# Patient Record
Sex: FEMALE | Race: WHITE | NOT HISPANIC OR LATINO | Employment: FULL TIME | URBAN - METROPOLITAN AREA
[De-identification: names, ages, dates, MRNs, and addresses within clinical notes are randomized per-mention and may not be internally consistent; named-entity substitution may affect disease eponyms.]

---

## 2017-11-08 ENCOUNTER — OFFICE VISIT (OUTPATIENT)
Dept: OBSTETRICS AND GYNECOLOGY | Facility: CLINIC | Age: 52
End: 2017-11-08

## 2017-11-08 VITALS
BODY MASS INDEX: 35.2 KG/M2 | SYSTOLIC BLOOD PRESSURE: 118 MMHG | HEIGHT: 66 IN | DIASTOLIC BLOOD PRESSURE: 70 MMHG | WEIGHT: 219 LBS

## 2017-11-08 DIAGNOSIS — Z01.419 WELL WOMAN EXAM WITH ROUTINE GYNECOLOGICAL EXAM: Primary | ICD-10-CM

## 2017-11-08 PROCEDURE — 99396 PREV VISIT EST AGE 40-64: CPT | Performed by: OBSTETRICS & GYNECOLOGY

## 2017-11-08 RX ORDER — MELOXICAM 15 MG/1
TABLET ORAL
Refills: 0 | COMMUNITY
Start: 2017-10-05 | End: 2021-06-30 | Stop reason: SDUPTHER

## 2017-11-08 NOTE — PROGRESS NOTES
Subjective:    Patient Jewels Beltrán is a 51 y.o. female.   Chief Complaint   Patient presents with   • Gynecologic Exam     MX TODAY. DX DUE. ALICIA HAS ONE SCHED. IN JAN. SMOKER  NO HYST.    CCNo menses in 3 months patient normally was getting breast tenderness PMS prior to periods has had no symptoms since she quit the periods no hot flashes so the patient certainly is perimenopausal  She is here for her annual exam and discussion about hormone control    HPI  patient's having knee issues and is on meloxicam  Patient is a smoker and does not want to quit options are discussed      The following portions of the patient's history were reviewed and updated as appropriate: allergies, current medications, past family history, past medical history, past social history, past surgical history and problem list.      Review of Systems   Constitutional: Negative.    HENT: Negative.    Eyes: Negative.    Respiratory: Negative.    Cardiovascular: Negative.    Gastrointestinal: Negative for abdominal distention, abdominal pain, anal bleeding, blood in stool, constipation, diarrhea, nausea, rectal pain and vomiting.   Endocrine: Negative for cold intolerance, heat intolerance, polydipsia, polyphagia and polyuria.   Genitourinary: Negative.  Negative for decreased urine volume, dyspareunia, dysuria, enuresis, flank pain, frequency, genital sores, hematuria, menstrual problem, pelvic pain, urgency, vaginal bleeding, vaginal discharge and vaginal pain.   Musculoskeletal: Negative.    Skin: Negative.    Allergic/Immunologic: Negative.    Neurological: Negative.    Hematological: Negative for adenopathy. Does not bruise/bleed easily.   Psychiatric/Behavioral: Negative for agitation, confusion and sleep disturbance. The patient is not nervous/anxious.          Objective:      Physical Exam   Constitutional: She appears well-developed and well-nourished. She is not intubated.   HENT:   Head: Hair is normal.   Nose: Nose normal.    Mouth/Throat: Oropharynx is clear and moist.   Eyes: Conjunctivae are normal.   Neck: Normal carotid pulses and no JVD present. No tracheal tenderness, no spinous process tenderness and no muscular tenderness present. Carotid bruit is not present. No rigidity. No edema, no erythema and normal range of motion present. No thyroid mass and no thyromegaly present.   Cardiovascular: Normal rate, regular rhythm, S1 normal and normal heart sounds.  Exam reveals no gallop.    No murmur heard.  Pulmonary/Chest: Effort normal. No accessory muscle usage or stridor. No apnea, no tachypnea and no bradypnea. She is not intubated. No respiratory distress. She has no wheezes. She has no rales. She exhibits no tenderness. Right breast exhibits no inverted nipple, no mass, no nipple discharge, no skin change and no tenderness. Left breast exhibits no inverted nipple, no mass, no nipple discharge, no skin change and no tenderness.   Abdominal: Soft. Bowel sounds are normal. She exhibits no distension and no mass. There is no tenderness. There is no rebound and no guarding. No hernia.   Genitourinary: Vagina normal and uterus normal. Rectal exam shows no external hemorrhoid, no internal hemorrhoid, no fissure, no mass, no tenderness and anal tone normal. There is no rash, tenderness, lesion or injury on the right labia. There is no rash, tenderness, lesion or injury on the left labia. Uterus is not deviated, not enlarged, not fixed and not tender. Cervix exhibits no motion tenderness, no discharge and no friability. Right adnexum displays no mass and no tenderness. Left adnexum displays no mass and no tenderness. No erythema, tenderness or bleeding in the vagina. No foreign body in the vagina. No signs of injury around the vagina. No vaginal discharge found.   Musculoskeletal: She exhibits no edema or tenderness.        Right shoulder: She exhibits no tenderness, no swelling, no pain and no spasm.   Lymphadenopathy:        Head  (right side): No submental, no submandibular, no tonsillar, no preauricular, no posterior auricular and no occipital adenopathy present.        Head (left side): No submental, no submandibular, no tonsillar, no preauricular, no posterior auricular and no occipital adenopathy present.     She has no cervical adenopathy.        Right cervical: No superficial cervical, no deep cervical and no posterior cervical adenopathy present.       Left cervical: No superficial cervical, no deep cervical and no posterior cervical adenopathy present.        Right axillary: No pectoral and no lateral adenopathy present.        Left axillary: No pectoral and no lateral adenopathy present.       Right: No inguinal, no supraclavicular and no epitrochlear adenopathy present.        Left: No inguinal, no supraclavicular and no epitrochlear adenopathy present.   Neurological: No cranial nerve deficit. Coordination normal.   Skin: Skin is warm and dry. No abrasion, no bruising, no burn, no lesion, no petechiae, no purpura and no rash noted. Rash is not macular, not maculopapular, not nodular and not urticarial. No cyanosis or erythema. No pallor. Nails show no clubbing.   Psychiatric: She has a normal mood and affect. Her behavior is normal.         Assessment andplan  this patient is perimenopausal and possibly postmenopausal having no hormone symptoms she had no prescriptions are given calcium and vitamin D are discussed immunizations are discussed in his take a regular multivitamin and a bone density next year    Patient has been instructed to perform a self breast exam on a weekly basis, a yearly mammogram, pap smear yearly unless instructed otherwise and bone density every 2 years.  I recommended that the patient not smoke, and discussed smoking cessation when appropriate.     Jewels was seen today for gynecologic exam.    Diagnoses and all orders for this visit:    Well woman exam with routine gynecological exam  -     IGP,rfx Aptima  HPV All Pth - ThinPrep Vial, Cervix

## 2017-11-10 LAB
CONV .: NORMAL
CYTOLOGIST CVX/VAG CYTO: NORMAL
CYTOLOGY CVX/VAG DOC THIN PREP: NORMAL
DX ICD CODE: NORMAL
HIV 1 & 2 AB SER-IMP: NORMAL
OTHER STN SPEC: NORMAL
PATH REPORT.FINAL DX SPEC: NORMAL
STAT OF ADQ CVX/VAG CYTO-IMP: NORMAL

## 2018-12-03 DIAGNOSIS — Z12.31 VISIT FOR SCREENING MAMMOGRAM: Primary | ICD-10-CM

## 2019-01-09 ENCOUNTER — OFFICE VISIT (OUTPATIENT)
Dept: OBSTETRICS AND GYNECOLOGY | Facility: CLINIC | Age: 54
End: 2019-01-09

## 2019-01-09 ENCOUNTER — APPOINTMENT (OUTPATIENT)
Dept: WOMENS IMAGING | Facility: HOSPITAL | Age: 54
End: 2019-01-09

## 2019-01-09 ENCOUNTER — PROCEDURE VISIT (OUTPATIENT)
Dept: OBSTETRICS AND GYNECOLOGY | Facility: CLINIC | Age: 54
End: 2019-01-09

## 2019-01-09 VITALS
SYSTOLIC BLOOD PRESSURE: 126 MMHG | DIASTOLIC BLOOD PRESSURE: 80 MMHG | BODY MASS INDEX: 36.8 KG/M2 | WEIGHT: 229 LBS | HEIGHT: 66 IN

## 2019-01-09 DIAGNOSIS — Z00.00 ANNUAL PHYSICAL EXAM: Primary | ICD-10-CM

## 2019-01-09 DIAGNOSIS — Z01.419 ENCOUNTER FOR ANNUAL ROUTINE GYNECOLOGICAL EXAMINATION: ICD-10-CM

## 2019-01-09 DIAGNOSIS — Z12.31 VISIT FOR SCREENING MAMMOGRAM: Primary | ICD-10-CM

## 2019-01-09 PROCEDURE — 99396 PREV VISIT EST AGE 40-64: CPT | Performed by: OBSTETRICS & GYNECOLOGY

## 2019-01-09 PROCEDURE — 77067 SCR MAMMO BI INCL CAD: CPT | Performed by: OBSTETRICS & GYNECOLOGY

## 2019-01-09 PROCEDURE — 77067 SCR MAMMO BI INCL CAD: CPT | Performed by: RADIOLOGY

## 2019-01-09 NOTE — PROGRESS NOTES
Subjective:    Patient Jewels Beltrán is a 53 y.o. female.   Chief Complaint   Patient presents with   • Gynecologic Exam     AE, NO HYST, SMOKER     CC patient's having no symptoms she is a smoker and needs to quit the patient was a little upset that she cannot do her mammogram today.  In the office so we will try to schedule it at the hospital since she is from each Which Is a Significant Inconvenience for the Otherwise Patient Is Doing Well Having No Major Complaints    HPI      The following portions of the patient's history were reviewed and updated as appropriate: allergies, current medications, past family history, past medical history, past social history, past surgical history and problem list.      Review of Systems   Constitutional: Negative.    HENT: Negative.    Eyes: Negative.    Respiratory: Negative.    Cardiovascular: Negative.    Gastrointestinal: Negative for abdominal distention, abdominal pain, anal bleeding, blood in stool, constipation, diarrhea, nausea, rectal pain and vomiting.   Endocrine: Negative for cold intolerance, heat intolerance, polydipsia, polyphagia and polyuria.   Genitourinary: Negative.  Negative for decreased urine volume, dyspareunia, dysuria, enuresis, flank pain, frequency, genital sores, hematuria, menstrual problem, pelvic pain, urgency, vaginal bleeding, vaginal discharge and vaginal pain.   Musculoskeletal: Negative.    Skin: Negative.    Allergic/Immunologic: Negative.    Neurological: Negative.    Hematological: Negative for adenopathy. Does not bruise/bleed easily.   Psychiatric/Behavioral: Negative for agitation, confusion and sleep disturbance. The patient is not nervous/anxious.          Objective:      Physical Exam   Constitutional: She appears well-developed and well-nourished. She is not intubated.   HENT:   Head: Hair is normal.   Nose: Nose normal.   Mouth/Throat: Oropharynx is clear and moist.   Eyes: Conjunctivae are normal.   Neck: Normal carotid  pulses and no JVD present. No tracheal tenderness, no spinous process tenderness and no muscular tenderness present. Carotid bruit is not present. No neck rigidity. No edema, no erythema and normal range of motion present. No thyroid mass and no thyromegaly present.   Cardiovascular: Normal rate, regular rhythm, S1 normal and normal heart sounds. Exam reveals no gallop.   No murmur heard.  Pulmonary/Chest: Effort normal. No accessory muscle usage or stridor. No apnea, no tachypnea and no bradypnea. She is not intubated. No respiratory distress. She has no wheezes. She has no rales. She exhibits no tenderness. Right breast exhibits no inverted nipple, no mass, no nipple discharge, no skin change and no tenderness. Left breast exhibits no inverted nipple, no mass, no nipple discharge, no skin change and no tenderness.   Abdominal: Soft. Bowel sounds are normal. She exhibits no distension and no mass. There is no tenderness. There is no rebound and no guarding. No hernia.   Genitourinary: Vagina normal and uterus normal. Rectal exam shows no external hemorrhoid, no internal hemorrhoid, no fissure, no mass, no tenderness and anal tone normal. There is no rash, tenderness, lesion or injury on the right labia. There is no rash, tenderness, lesion or injury on the left labia. Uterus is not deviated, not enlarged, not fixed and not tender. Cervix exhibits no motion tenderness, no discharge and no friability. Right adnexum displays no mass and no tenderness. Left adnexum displays no mass and no tenderness. No erythema, tenderness or bleeding in the vagina. No foreign body in the vagina. No signs of injury around the vagina. No vaginal discharge found.   Musculoskeletal: She exhibits no edema or tenderness.        Right shoulder: She exhibits no tenderness, no swelling, no pain and no spasm.   Lymphadenopathy:        Head (right side): No submental, no submandibular, no tonsillar, no preauricular, no posterior auricular and  no occipital adenopathy present.        Head (left side): No submental, no submandibular, no tonsillar, no preauricular, no posterior auricular and no occipital adenopathy present.     She has no cervical adenopathy.        Right cervical: No superficial cervical, no deep cervical and no posterior cervical adenopathy present.       Left cervical: No superficial cervical, no deep cervical and no posterior cervical adenopathy present.        Right axillary: No pectoral and no lateral adenopathy present.        Left axillary: No pectoral and no lateral adenopathy present.       Right: No inguinal, no supraclavicular and no epitrochlear adenopathy present.        Left: No inguinal, no supraclavicular and no epitrochlear adenopathy present.   Neurological: No cranial nerve deficit. Coordination normal.   Skin: Skin is warm and dry. No abrasion, no bruising, no burn, no lesion, no petechiae, no purpura and no rash noted. Rash is not macular, not maculopapular, not nodular and not urticarial. No cyanosis or erythema. No pallor. Nails show no clubbing.   Psychiatric: She has a normal mood and affect. Her behavior is normal.         Assessment and Plan: Patient needs to quit smoking the otherwise exam is very good will try and schedule her mammogram today just for patient convenience    Patient has been instructed to perform a self breast exam on a weekly basis, a yearly mammogram, pap smear yearly unless instructed otherwise and bone density every 2 years.  I recommended that the patient not smoke, and discussed smoking cessation when appropriate.     There are no diagnoses linked to this encounter.

## 2019-01-14 DIAGNOSIS — Z12.31 VISIT FOR SCREENING MAMMOGRAM: ICD-10-CM

## 2019-06-03 ENCOUNTER — HOSPITAL ENCOUNTER (OUTPATIENT)
Dept: GENERAL RADIOLOGY | Facility: HOSPITAL | Age: 54
Discharge: HOME | End: 2019-06-03
Attending: NURSE PRACTITIONER

## 2019-07-26 ENCOUNTER — TELEPHONE (OUTPATIENT)
Dept: OBSTETRICS AND GYNECOLOGY | Facility: CLINIC | Age: 54
End: 2019-07-26

## 2019-07-26 NOTE — TELEPHONE ENCOUNTER
Scheduled patient with Rodolfo for this issue since she wasn't established with  yet and schedules were conflicting. She could only schedule Wednesdays and wanted to stay at [facility].

## 2019-07-26 NOTE — TELEPHONE ENCOUNTER
Please help the patient to schedule an ultrasound and an office visit.  We can then discuss her postmenopausal spotting and rule out any concerning findings.  Thank you.

## 2019-07-26 NOTE — TELEPHONE ENCOUNTER
Patient called she said that she hasn't had a period in over a year and just the other day she started spotting she is not sure if this is a concern. She would like to know if its recommended she be seen or if this can be stress related? She says she has been under a lot of stress with moving her mother into hospice this week.

## 2020-08-20 ENCOUNTER — OFFICE VISIT CONVERTED (OUTPATIENT)
Dept: ORTHOPEDIC SURGERY | Facility: CLINIC | Age: 55
End: 2020-08-20
Attending: ORTHOPAEDIC SURGERY

## 2020-10-13 ENCOUNTER — OFFICE VISIT CONVERTED (OUTPATIENT)
Dept: ORTHOPEDIC SURGERY | Facility: CLINIC | Age: 55
End: 2020-10-13
Attending: ORTHOPAEDIC SURGERY

## 2020-10-13 ENCOUNTER — CONVERSION ENCOUNTER (OUTPATIENT)
Dept: ORTHOPEDIC SURGERY | Facility: CLINIC | Age: 55
End: 2020-10-13

## 2021-03-04 ENCOUNTER — CONVERSION ENCOUNTER (OUTPATIENT)
Dept: ORTHOPEDIC SURGERY | Facility: CLINIC | Age: 56
End: 2021-03-04

## 2021-03-04 ENCOUNTER — OFFICE VISIT CONVERTED (OUTPATIENT)
Dept: ORTHOPEDIC SURGERY | Facility: CLINIC | Age: 56
End: 2021-03-04
Attending: ORTHOPAEDIC SURGERY

## 2021-05-10 NOTE — H&P
History and Physical      Patient Name: Jewels Beltrán   Patient ID: [id number]   Sex: Female   YOB: 1965    Primary Care Provider: Bradley Lopez MD   Referring Provider: Riya BOTELLO    Visit Date: August 20, 2020    Provider: Bradley Bal MD   Location: [facility]   Location Address: [address] [place] [address]  [id number]   Location Phone: [phone]          Chief Complaint  · right knee pain      History Of Present Illness  Jewels Beltrán is a 54 year old /White female who presents today to [facility].      The patient presents today for evaluation of right knee pain, history of pain for three to four years. She denies a specific injury or trauma. She had an MRI and x-rays last June revealing osteoarthritis and effusion and small bakers cyst to the posterior knee. She reports swelling over the anterior knee and pain over medial knee. Patient works at [employer].  She reports pain at night and with specific movements, especially flexion. She has been taking Diclofenac recently without relief of pain. She has previously tried Meloxicam and this helped with her pain. She reports pain with driving as well.            Past Medical History  *No Pertinent Past Medical History; Arthritis; Thyroid disorder         Past Surgical History  Colonoscopy; Tubal ligation         Medication List  aspirin 81 mg oral tablet,chewable; diclofenac sodium 75 mg oral tablet,delayed release (DR/EC); Mobic 15 mg oral tablet         Allergy List  NO KNOWN DRUG ALLERGIES       Allergies Reconciled  Family Medical History  Heart Disease; Cancer, Unspecified; Diabetes, unspecified type; Family history of Arthritis         Social History  Alcohol (Current some day); Alcohol Use (Current some day); Caffeine (Current every day); lives with children; Recreational Drug Use (Never); Tobacco (Current every day); ; Working         Review of  "Systems  · Constitutional  o Denies  o : fever, chills, weight loss  · Cardiovascular  o Denies  o : chest pain, shortness of breath  · Gastrointestinal  o Denies  o : liver disease, heartburn, nausea, blood in stools  · Genitourinary  o Denies  o : painful urination, blood in urine  · Integument  o Denies  o : rash, itching  · Neurologic  o Denies  o : headache, weakness, loss of consciousness  · Musculoskeletal  o Denies  o : painful, swollen joints  · Psychiatric  o Denies  o : drug/alcohol addiction, anxiety, depression      Vitals  Date Time BP Position Site L\R Cuff Size HR RR TEMP (F) WT  HT  BMI kg/m2 BSA m2 O2 Sat        08/20/2020 10:42 AM      73 - R   238lbs 6oz 5'  6\" 38.47 2.24 94 %          Physical Examination  · Constitutional  o Appearance  o : well developed, well-nourished, no obvious deformities present  · Head and Face  o Head  o :   § Inspection  § : normocephalic  o Face  o :   § Inspection  § : no facial lesions  · Eyes  o Conjunctivae  o : conjunctivae normal  o Sclerae  o : sclerae white  · Ears, Nose, Mouth and Throat  o Ears  o :   § External Ears  § : appearance within normal limits  § Hearing  § : intact  o Nose  o :   § External Nose  § : appearance normal  · Neck  o Inspection/Palpation  o : normal appearance  o Range of Motion  o : full range of motion  · Respiratory  o Respiratory Effort  o : breathing unlabored  o Inspection of Chest  o : normal appearance  o Auscultation of Lungs  o : no audible wheezing or rales  · Cardiovascular  o Heart  o : regular rate  · Gastrointestinal  o Abdominal Examination  o : soft and non-tender  · Skin and Subcutaneous Tissue  o General Inspection  o : intact, no rashes  · Psychiatric  o General  o : Alert and oriented x3  o Judgement and Insight  o : judgment and insight intact  o Mood and Affect  o : mood normal, affect appropriate  · Right Knee  o Inspection  o : Skin intact, no skin discoloration, Positive swelling to anteromedial knee, small " effusion, full extension, flexion 110 degrees, stable to varus and valgus stress, stable to anterior and posterior drawer, mild pain with Quynh's, negative Lachman's, tenderness to posterior knee and some to medial knee, sensation grossly intact  · Injection Note/Aspiration Note  o Site  o : right knee  o Procedure  o : Procedure: After educating the patient, patient gave consent for procedure. After using Chloraprep, the joint space was injected. The patient tolerated the procedure well.  o Medication  o : 80 mg of DepoMedrol with 9cc of 1% Lidocaine  · In Office Procedures  o View  o : LAT/SUNRISE/STANDING   o Site  o : right, knee  o Indication  o : Right knee pain  o Study  o : X-rays ordered, taken in the office, and reviewed today.  o Xray  o : Reveals no acute fracture, moderate degenerative changes of the knee, tricompartmental degenerative changes   o Comparative Data  o : No comparative data found          Assessment  · Primary osteoarthritis of right knee     715.16/M17.11  · Right knee pain, unspecified chronicity     719.46/M25.561      Plan  · Orders  o Depo-Medrol injection 80mg () - 715.16/M17.11 - 08/20/2020   Lot [id number] Exp 06 2021 [employer] Administered by TACO MARTIN MD   o Knee Intra-articular Injection without US Guidance [facility] (92676) - 715.16/M17.11 - 08/20/2020   Lot [id number] Exp 07 01 2021 [employer] Administered by TACO MARTIN MD   o Knee (Right) [facility] Preferred View ([id number]) - 719.46/M25.561 - 08/20/2020  o Tobacco cessation counseling completed (4004F) - - 08/20/2020  · Medications  o Medications have been Reconciled  o Transition of Care or Provider Policy  · Instructions  o Reviewed the patient's Past Medical, Social, and Family history as well as the ROS at today's visit, no changes.  o Call or return if worsening symptoms.  o X-ray ordered, taken and reviewed at this visit.  o Follow up in 6 to 8 weeks.  o The above service was scribed by Shannan Merritt on my  behalf and I attest to the accuracy of the note. jsb  o Diagnosis and treatment options discussed. We discussed viscosupplementation verses steroid injections. Eric is not currently covering visco-injections and we recommended Zilretta or regular steroid. She expressed understanding and wished to proceed with regular steroid and Meloxicam prescription. May consider Zilretta injection in the future.             Electronically Signed by: Shannan Merritt-, Other -Author on August 22, 2020 10:29:38 PM  Electronically Co-signed by: Bradley Bal MD -Reviewer on August 23, 2020 09:27:38 PM

## 2021-05-13 NOTE — PROGRESS NOTES
Progress Note      Patient Name: Jewels Beltrán   Patient ID: [id number]   Sex: Female   YOB: 1965    Primary Care Provider: Bradley Lopez MD   Referring Provider: Riya BOTELLO    Visit Date: October 13, 2020    Provider: Bradley Bal MD   Location: [facility]   Location Address: [address] [place] [address]  [id number]   Location Phone: [phone]          Chief Complaint  · right knee pain      History Of Present Illness  Jewels Beltrán is a 54 year old /White female who presents today to [facility]. Patient presents for follow up evaluation of right knee pain/OA. Patient states the injection she received at last visit had no benefit. She states her knee pain persisted for about a month and then her pain started getting relief after she was taking Meloxicam for a month. Patient states her knee pain is worse in the posterior knee and in the anterior medial and lateral knee. Patient states she stands all day for work and she states this causes more pain. Patient denies swelling, denies catching/locking or buckling.       Past Medical History  *No Pertinent Past Medical History; Arthritis; Thyroid disorder         Past Surgical History  Colonoscopy; Tubal ligation         Medication List  aspirin 81 mg oral tablet,chewable; Mobic 15 mg oral tablet         Allergy List  NO KNOWN DRUG ALLERGIES       Allergies Reconciled  Family Medical History  Heart Disease; Cancer, Unspecified; Diabetes, unspecified type; Family history of Arthritis         Social History  Alcohol (Current some day); Alcohol Use (Current some day); Caffeine (Current every day); lives with children; Recreational Drug Use (Never); Tobacco (Current every day); ; Working         Review of Systems  · Constitutional  o Denies  o : fever, chills, weight loss  · Cardiovascular  o Denies  o : chest pain, shortness of breath  · Gastrointestinal  o Denies  o : liver disease,  "heartburn, nausea, blood in stools  · Genitourinary  o Denies  o : painful urination, blood in urine  · Integument  o Denies  o : rash, itching  · Neurologic  o Denies  o : headache, weakness, loss of consciousness  · Musculoskeletal  o Denies  o : painful, swollen joints  · Psychiatric  o Denies  o : drug/alcohol addiction, anxiety, depression      Vitals  Date Time BP Position Site L\R Cuff Size HR RR TEMP (F) WT  HT  BMI kg/m2 BSA m2 O2 Sat FR L/min FiO2        10/13/2020 08:08 AM      75 - R   230lbs 0oz 5'  6\" 37.12 2.2 97 %            Physical Examination  · Constitutional  o Appearance  o : well developed, well-nourished, no obvious deformities present  · Head and Face  o Head  o :   § Inspection  § : normocephalic  o Face  o :   § Inspection  § : no facial lesions  · Eyes  o Conjunctivae  o : conjunctivae normal  o Sclerae  o : sclerae white  · Ears, Nose, Mouth and Throat  o Ears  o :   § External Ears  § : appearance within normal limits  § Hearing  § : intact  o Nose  o :   § External Nose  § : appearance normal  · Neck  o Inspection/Palpation  o : normal appearance  o Range of Motion  o : full range of motion  · Respiratory  o Respiratory Effort  o : breathing unlabored  o Inspection of Chest  o : normal appearance  o Auscultation of Lungs  o : no audible wheezing or rales  · Cardiovascular  o Heart  o : regular rate  · Gastrointestinal  o Abdominal Examination  o : soft and non-tender  · Skin and Subcutaneous Tissue  o General Inspection  o : intact, no rashes  · Psychiatric  o General  o : Alert and oriented x3  o Judgement and Insight  o : judgment and insight intact  o Mood and Affect  o : mood normal, affect appropriate  · Right Knee  o Inspection  o : Skin intact, no skin discoloration, Positive swelling to anteromedial knee, small effusion, full extension, flexion 110 degrees, stable to varus and valgus stress, stable to anterior and posterior drawer, mild pain with Quynh's, negative " Lachman's, tenderness to posterior knee and some to medial knee, sensation grossly intact   · Imaging  o Imaging  o : In Office ProceduresView : LAT/SUNRISE/STANDING Site : right, knee Indication : Right knee pain Study : X-rays ordered, taken in the office, and reviewed today. Xray : Reveals no acute fracture, moderate degenerative changes of the knee, tricompartmental degenerative changes Comparative Data : No comparative data found           Assessment  · Primary osteoarthritis of right knee     715.16/M17.11  · Right knee pain, unspecified chronicity     719.46/M25.561      Plan  · Medications  o Medications have been Reconciled  o Transition of Care or Provider Policy  · Instructions  o Reviewed the patient's Past Medical, Social, and Family history as well as the ROS at today's visit, no changes.  o Call or return if worsening symptoms.  o Follow up in 6 weeks.  o The above service was scribed by Paul Perdomo PA-C on my behalf and I attest to the accuracy of the note. jsb  o Patient was educated regarding surgical intervention with knee replacement versus continued conservative treatment. Patient states she would like to continue conservative treatment. Patient was advised to continue Meloxicam, she was given home exercises, and we will seek approval for Zilretta injection. Follow up in 6 weeks for Zilretta injection.             Electronically Signed by: Maxx Perdomo PA-C -Author on October 13, 2020 08:36:54 AM  Electronically Co-signed by: Bradley Bal MD -Reviewer on October 13, 2020 09:47:53 PM

## 2021-05-14 VITALS — WEIGHT: 238.37 LBS | OXYGEN SATURATION: 94 % | HEART RATE: 73 BPM | HEIGHT: 66 IN | BODY MASS INDEX: 38.31 KG/M2

## 2021-05-14 VITALS — HEART RATE: 75 BPM | WEIGHT: 230 LBS | OXYGEN SATURATION: 97 % | BODY MASS INDEX: 36.96 KG/M2 | HEIGHT: 66 IN

## 2021-05-14 VITALS — WEIGHT: 230 LBS | BODY MASS INDEX: 36.96 KG/M2 | HEIGHT: 66 IN

## 2021-05-14 NOTE — PROGRESS NOTES
Progress Note      Patient Name: Jewels Beltrán   Patient ID: [id number]   Sex: Female   YOB: 1965    Primary Care Provider: Bradley Lopez MD   Referring Provider: Riya BOTELLO    Visit Date: March 4, 2021    Provider: Bradley Bal MD   Location: [facility]   Location Address: [address] [place] [address]  [id number]   Location Phone: [phone]          Chief Complaint  · Right knee pain      History Of Present Illness  Jewels Beltrán is a 55 year old /White female who presents today to [facility].      The patient presents here today for follow up evaluation of her right knee, osteoarthritis. The patient has received steroid injections in the past with no relief. She does take meloxicam that does provide some relief to her knee. She locates her pain to the posterior knee, interior medial aspect of the knee, and the lateral part of her knee. She is here today for a Zilretta injection in her right knee. She describes it as an annoying pain. She denies mechanical symptoms.       Past Medical History  *No Pertinent Past Medical History; Arthritis; Thyroid disorder         Past Surgical History  Colonoscopy; Tubal ligation         Medication List  aspirin 81 mg oral tablet,chewable; MELOXICAM 15MG TABLETS; Mobic 15 mg oral tablet         Allergy List  NO KNOWN DRUG ALLERGIES       Allergies Reconciled  Family Medical History  Heart Disease; Cancer, Unspecified; Diabetes, unspecified type; Family history of Arthritis         Social History  Alcohol (Current some day); Alcohol Use (Current some day); Caffeine (Current every day); lives with children; Recreational Drug Use (Never); Tobacco (Current every day); ; Working         Review of Systems  · Constitutional  o Denies  o : fever, chills, weight loss  · Cardiovascular  o Denies  o : chest pain, shortness of breath  · Gastrointestinal  o Denies  o : liver disease, heartburn, nausea,  "blood in stools  · Genitourinary  o Denies  o : painful urination, blood in urine  · Integument  o Denies  o : rash, itching  · Neurologic  o Denies  o : headache, weakness, loss of consciousness  · Musculoskeletal  o Denies  o : painful, swollen joints  · Psychiatric  o Denies  o : drug/alcohol addiction, anxiety, depression      Vitals  Date Time BP Position Site L\R Cuff Size HR RR TEMP (F) WT  HT  BMI kg/m2 BSA m2 O2 Sat FR L/min FiO2        03/04/2021 08:17 AM         230lbs 0oz 5'  6\" 37.12 2.2             Physical Examination  · Constitutional  o Appearance  o : well developed, well-nourished, no obvious deformities present  · Head and Face  o Head  o :   § Inspection  § : normocephalic  o Face  o :   § Inspection  § : no facial lesions  · Eyes  o Conjunctivae  o : conjunctivae normal  o Sclerae  o : sclerae white  · Ears, Nose, Mouth and Throat  o Ears  o :   § External Ears  § : appearance within normal limits  § Hearing  § : intact  o Nose  o :   § External Nose  § : appearance normal  · Neck  o Inspection/Palpation  o : normal appearance  o Range of Motion  o : full range of motion  · Respiratory  o Respiratory Effort  o : breathing unlabored  o Inspection of Chest  o : normal appearance  o Auscultation of Lungs  o : no audible wheezing or rales  · Cardiovascular  o Heart  o : regular rate  · Gastrointestinal  o Abdominal Examination  o : soft and non-tender  · Skin and Subcutaneous Tissue  o General Inspection  o : intact, no rashes  · Psychiatric  o General  o : Alert and oriented x3  o Judgement and Insight  o : judgment and insight intact  o Mood and Affect  o : mood normal, affect appropriate  · Right Knee  o Inspection  o : Skin intact, no skin discoloration, Positive swelling to anteromedial knee, small effusion, full extension, flexion 110 degrees, stable to varus and valgus stress, stable to anterior and posterior drawer, mild pain with Quynh's, negative Lachman's, tenderness to posterior " knee and some to medial knee, sensation grossly intact  · Injection Note/Aspiration Note  o Site  o : Right knee  o Procedure  o : Procedure: After educating the patient, patient gave consent for procedure. After using Chloraprep, the joint space was injected. The patient tolerated the procedure well.  o Medication  o : 32mg of Zilretta with 5cc of 1% Lidocaine          Assessment  · Primary osteoarthritis of right knee     715.16/M17.11  · Right knee pain, unspecified chronicity     719.46/M25.561      Plan  · Orders  o Zilretta- 1 mg. () () - 715.16/M17.11 - 03/04/2021   Lot [id number] Exp 10 2021 Flexion Administered by JOANNA NICOLE PA-C  o Knee Intra-articular Injection without US [facility] (91499) - 715.16/M17.11 - 03/04/2021   Lot [id number] Exp 02 2022 Hospira Administered by JOANNA NICOLE PA-C  · Medications  o Medications have been Reconciled  o Transition of Care or Provider Policy  · Instructions  o Reviewed the patient's Past Medical, Social, and Family history as well as the ROS at today's visit, no changes.  o Call or return if worsening symptoms.  o Follow up in 6 weeks.  o The above service was scribed by Nirmala Noyola on my behalf and I attest to the accuracy of the note. jsb  o Discussed the risks and benefits of a Zilretta injection in her right knee. The patient expressed understanding and wished to proceed. She tolerated the injection well.   o Electronically Identified Patient Education Materials Provided Electronically            Electronically Signed by: Nirmala Noyola MA -Author on March 4, 2021 08:40:22 AM  Electronically Co-signed by: Bradley Bal MD -Reviewer on March 4, 2021 08:12:48 PM

## 2021-05-16 ENCOUNTER — HOSPITAL ENCOUNTER (OUTPATIENT)
Dept: URGENT CARE | Facility: CLINIC | Age: 56
Discharge: HOME | End: 2021-05-16
Attending: EMERGENCY MEDICINE

## 2021-05-18 ENCOUNTER — CONVERSION ENCOUNTER (OUTPATIENT)
Dept: FAMILY MEDICINE CLINIC | Facility: CLINIC | Age: 56
End: 2021-05-18

## 2021-05-18 ENCOUNTER — OFFICE VISIT CONVERTED (OUTPATIENT)
Dept: FAMILY MEDICINE CLINIC | Facility: CLINIC | Age: 56
End: 2021-05-18
Attending: NURSE PRACTITIONER

## 2021-05-20 ENCOUNTER — HOSPITAL ENCOUNTER (OUTPATIENT)
Dept: LAB | Facility: HOSPITAL | Age: 56
Discharge: HOME | End: 2021-05-20
Attending: NURSE PRACTITIONER

## 2021-05-20 LAB
ALBUMIN SERPL-MCNC: 4.1 G/DL (ref 3.5–5)
ALBUMIN/GLOB SERPL: 1.4 {RATIO} (ref 1.4–2.6)
ALP SERPL-CCNC: 80 U/L (ref 53–141)
ALT SERPL-CCNC: 22 U/L (ref 10–40)
ANION GAP SERPL CALC-SCNC: 16 MMOL/L (ref 8–19)
AST SERPL-CCNC: 18 U/L (ref 15–50)
BASOPHILS # BLD AUTO: 0.04 10*3/UL (ref 0–0.2)
BASOPHILS NFR BLD AUTO: 0.5 % (ref 0–3)
BILIRUB SERPL-MCNC: 0.64 MG/DL (ref 0.2–1.3)
BUN SERPL-MCNC: 21 MG/DL (ref 5–25)
BUN/CREAT SERPL: 28 {RATIO} (ref 6–20)
CALCIUM SERPL-MCNC: 9.9 MG/DL (ref 8.7–10.4)
CHLORIDE SERPL-SCNC: 108 MMOL/L (ref 99–111)
CHOLEST SERPL-MCNC: 172 MG/DL (ref 107–200)
CHOLEST/HDLC SERPL: 4.3 {RATIO} (ref 3–6)
CONV ABS IMM GRAN: 0.02 10*3/UL (ref 0–0.2)
CONV CO2: 20 MMOL/L (ref 22–32)
CONV IMMATURE GRAN: 0.3 % (ref 0–1.8)
CONV TOTAL PROTEIN: 7.1 G/DL (ref 6.3–8.2)
CREAT UR-MCNC: 0.76 MG/DL (ref 0.5–0.9)
DEPRECATED RDW RBC AUTO: 44.3 FL (ref 36.4–46.3)
EOSINOPHIL # BLD AUTO: 0.18 10*3/UL (ref 0–0.7)
EOSINOPHIL # BLD AUTO: 2.4 % (ref 0–7)
ERYTHROCYTE [DISTWIDTH] IN BLOOD BY AUTOMATED COUNT: 13.6 % (ref 11.7–14.4)
FOLATE SERPL-MCNC: 19.6 NG/ML (ref 4.8–20)
GFR SERPLBLD BASED ON 1.73 SQ M-ARVRAT: >60 ML/MIN/{1.73_M2}
GLOBULIN UR ELPH-MCNC: 3 G/DL (ref 2–3.5)
GLUCOSE SERPL-MCNC: 103 MG/DL (ref 65–99)
HCT VFR BLD AUTO: 44 % (ref 37–47)
HDLC SERPL-MCNC: 40 MG/DL (ref 40–60)
HGB BLD-MCNC: 14.2 G/DL (ref 12–16)
LDLC SERPL CALC-MCNC: 107 MG/DL (ref 70–100)
LYMPHOCYTES # BLD AUTO: 1.88 10*3/UL (ref 1–5)
LYMPHOCYTES NFR BLD AUTO: 24.8 % (ref 20–45)
MCH RBC QN AUTO: 28.9 PG (ref 27–31)
MCHC RBC AUTO-ENTMCNC: 32.3 G/DL (ref 33–37)
MCV RBC AUTO: 89.6 FL (ref 81–99)
MONOCYTES # BLD AUTO: 0.78 10*3/UL (ref 0.2–1.2)
MONOCYTES NFR BLD AUTO: 10.3 % (ref 3–10)
NEUTROPHILS # BLD AUTO: 4.69 10*3/UL (ref 2–8)
NEUTROPHILS NFR BLD AUTO: 61.7 % (ref 30–85)
NRBC CBCN: 0 % (ref 0–0.7)
OSMOLALITY SERPL CALC.SUM OF ELEC: 293 MOSM/KG (ref 273–304)
PLATELET # BLD AUTO: 294 10*3/UL (ref 130–400)
PMV BLD AUTO: 11.6 FL (ref 9.4–12.3)
POTASSIUM SERPL-SCNC: 4.1 MMOL/L (ref 3.5–5.3)
RBC # BLD AUTO: 4.91 10*6/UL (ref 4.2–5.4)
SODIUM SERPL-SCNC: 140 MMOL/L (ref 135–147)
T4 FREE SERPL-MCNC: 1.1 NG/DL (ref 0.9–1.8)
TRIGL SERPL-MCNC: 123 MG/DL (ref 40–150)
TSH SERPL-ACNC: 1.49 M[IU]/L (ref 0.27–4.2)
VIT B12 SERPL-MCNC: 403 PG/ML (ref 211–911)
VLDLC SERPL-MCNC: 25 MG/DL (ref 5–37)
WBC # BLD AUTO: 7.59 10*3/UL (ref 4.8–10.8)

## 2021-05-22 ENCOUNTER — TRANSCRIBE ORDERS (OUTPATIENT)
Dept: ADMINISTRATIVE | Facility: HOSPITAL | Age: 56
End: 2021-05-22

## 2021-05-22 DIAGNOSIS — Z12.31 SCREENING MAMMOGRAM, ENCOUNTER FOR: Primary | ICD-10-CM

## 2021-06-05 NOTE — H&P
History and Physical      Patient Name: Jewels Beltrán   Patient ID: [id number]   Sex: Female   YOB: 1965    Primary Care Provider: Ashly BOTELLO   Referring Provider: Ashly BOTELLO    Visit Date: May 18, 2021    Provider: AYAN Garzon   Location: [facility]   Location Address: [address] [place] [address], [address]  [place], [place]  [id number]   Location Phone: [phone]          Chief Complaint  · New Patient-Establish Care   · Elevated BP      History Of Present Illness  Jewels Beltrán is a 55 year old /White female who presents for evaluation and treatment of:      New patient to establish care, previous PCP Dr Lopez     She was seen at Urgent Care over the weekend for back pain and was told her BP was elevated @ 158/109 & 161/87. So she is needing to discuss that. states used to be on BP meds at one time, is not really sure why it was stopped.     denies any recent weight change, diet change, is under some stress currently      Knee Pain: She is taking Meloxicam with good results and is followed by Dr Bal    Coloscopy: 2018-, re-check 5 years    Mammo: due   PHQ 9: 6- denies SI/HI       smokes 1/2ppd for greater than 30 years. knows she needs to quit, not ready              Past Medical History  Disease Name Date Onset Notes   Arthritis --  --    Thyroid disorder --  --          Past Surgical History  Procedure Name Date Notes   Abdominal abscess drainage --  --    Colonoscopy --  --    Tubal ligation --  --          Medication List  Name Date Started Instructions   MELOXICAM 15MG TABLETS 01/28/2021 TAKE 1 TABLET(15 MG) BY MOUTH EVERY DAY         Allergy List  Allergen Name Date Reaction Notes   NO KNOWN DRUG ALLERGIES --  --  --        Allergies Reconciled  Family Medical History  Disease Name Relative/Age Notes   Heart Disease Father/   Father   Cancer, Unspecified Mother/   Mother   Diabetes, unspecified type Mother/    "Mother   Family history of Arthritis Mother/   Mother         Social History  Finding Status Start/Stop Quantity Notes   Alcohol Current some day --/-- --  drinks monthly; beer and liquor   Caffeine Current every day --/-- --  drinks regularly; 3-4 times per day   lives with children --  --/-- --  --    Recreational Drug Use Never --/-- --  no   Tobacco Current every day --/-- 1 PPD current every day smoker, 0.5 pack per day, smoked 21-30 years  current everyday smoker; started smoking at age 15; smoked 10 cigarette(s) per day    --  --/-- --  --    Working --  --/-- --  --          Review of Systems  · Constitutional  o Admits  o : fatigue   o Denies  o : , night sweats  · Eyes  o Denies  o : double vision, blurred vision  · HENT  o Denies  o : vertigo, recent head injury  · Breasts  o Denies  o : abnormal changes in breast size, additional breast symptoms except as noted in the HPI  · Cardiovascular  o Denies  o : chest pain, irregular heart beats  · Respiratory  o Denies  o : shortness of breath, productive cough  · Gastrointestinal  o Denies  o : nausea, vomiting  · Genitourinary  o Denies  o : dysuria, urinary retention  · Integument  o Denies  o : hair growth change, new skin lesions  · Neurologic  o Denies  o : altered mental status, seizures  · Musculoskeletal  o Denies  o : joint swelling, limitation of motion  · Endocrine  o Denies  o : cold intolerance, heat intolerance  · Heme-Lymph  o Denies  o : petechiae, lymph node enlargement or tenderness  · Allergic-Immunologic  o Denies  o : frequent illnesses      Vitals  Date Time BP Position Site L\R Cuff Size HR RR TEMP (F) WT  HT  BMI kg/m2 BSA m2 O2 Sat FR L/min FiO2 HC       05/18/2021 07:49 /97 Sitting    85 - R  97.9 241lbs 8oz 5'  6\" 38.98 2.26 98 %  21%    05/18/2021 08:18 /88 Sitting                       Physical Examination  · Constitutional  o Appearance  o : well developed, well-nourished, no acute distress  · Head and " Face  o Head  o : normocephalic, atraumatic  · Ears, Nose, Mouth and Throat  o Ears  o :   § External Ears  § : external auditory canal appearance normal, no discharge present  § Otoscopic Examination  § : tympanic membranes pearly white/gray bilaterally  o Nose  o :   § External Nose  § : no lesions noted  § Nasopharynx  § : no discharge present  o Oral Cavity  o :   § Oral Mucosa  § : oral mucosa light pink  o Throat  o :   § Oropharynx  § : tonsils without exudate, no palatal petechiae  · Neck  o Inspection/Palpation  o : normal appearance, no masses or tenderness, trachea midline  o Thyroid  o : gland size normal, nontender, no nodules or masses present on palpation  · Respiratory  o Respiratory Effort  o : breathing unlabored  o Inspection of Chest  o : chest rise symmetric bilaterally  o Auscultation of Lungs  o : clear to auscultation bilaterally throughout inspiration and expiration  · Cardiovascular  o Heart  o :   § Auscultation of Heart  § : regular rate and rhythm, no murmurs, gallops or rubs  o Peripheral Vascular System  o :   § Extremities  § : no edema  · Lymphatic  o Neck  o : no cervical lymphadenopathy, no supraclavicular lymphadenopathy  · Psychiatric  o Mood and Affect  o : mood normal, affect appropriate          Assessment  · Screening for depression     V79.0/Z13.89  · Screening for lipid disorders     V77.91/Z13.220  · Visit for screening mammogram     V76.12/Z12.31  · Essential hypertension     401.9/I10  discussed quitting smoking would help with lowering BP   · Fatigue     780.79/R53.83  · Nicotine dependence     305.1/F17.200    Problems Reconciled  Plan  · Orders  o ACO-18: Positive screen for clinical depression using a standardized tool and a follow-up plan documented () - V79.0/Z13.89 - 05/18/2021  o Screening Mammography; Bilateral 3D (54688, 09020, ) - V76.12/Z12.31 - 05/18/2021  o Thyroid Profile (75237, 29665, THYII) - 780.79/R53.83 - 05/18/2021  o B12 Folate levels  (B12FO) - 780.79/R53.83 - 05/18/2021  o Smoking cessation counseling, 3-10 minutes [facility] (11183) - 305.1/F17.200 - 05/18/2021  o ACO-17: Screened for tobacco use AND received tobacco cessation intervention (4004F) - 305.1/F17.200 - 05/18/2021  o Physical, Primary Care Panel (CBC, CMP, Lipid, TSH) [facility] (32843, 33999, 99398, 54038) - 401.9/I10, 780.79/R53.83, V77.91/Z13.220 - 05/18/2021  o ACO-14: Influenza immunization administered or previously received [facility] () - - 05/18/2021  o ACO-39: Current medications updated and reviewed (, 1159F) - - 05/18/2021  · Medications  o lisinopril 10 mg oral tablet   SIG: take 1 tablet (10 mg) by oral route once daily   DISP: (30) Tablet with 1 refills  Prescribed on 05/18/2021     o Medications have been Reconciled  o Transition of Care or Provider Policy  · Instructions  o Depression Screen completed and scanned into the EMR under the designated folder within the patient's documents.  o Patient advised to monitor blood pressure (B/P) at home and journal readings. Patient informed that a B/P reading at home of more than 130/80 is considered hypertension. For readings greater bemx196/90 or higher patient is advised to follow up in the office with readings for management. Patient advised to limit sodium intake.  o *Form of nicotine being used:   o Patient was strongly encouraged to discontinue use of any nicotine containing product or minimize the use of the product.  o Take all medications as prescribed/directed.  o Patient was educated/instructed on their diagnosis, treatment and medications prior to discharge from the clinic today.  o Patient instructed to seek medical attention urgently for new or worsening symptoms.  o Call the office with any concerns or questions.  o Chronic conditions reviewed and taken into consideration for today's treatment plan.  o Electronically Identified Patient Education Materials Provided Electronically  · Disposition  o Call or Return if  symptoms worsen or persist.  o Follow Up PRN  o Follow Up in 1 month            Electronically Signed by: AYAN Garzon -Author on May 18, 2021 08:34:10 AM

## 2021-06-11 ENCOUNTER — LAB (OUTPATIENT)
Dept: LAB | Facility: HOSPITAL | Age: 56
End: 2021-06-11

## 2021-06-11 ENCOUNTER — TRANSCRIBE ORDERS (OUTPATIENT)
Dept: ADMINISTRATIVE | Facility: HOSPITAL | Age: 56
End: 2021-06-11

## 2021-06-11 DIAGNOSIS — R73.01 IMPAIRED FASTING GLUCOSE: ICD-10-CM

## 2021-06-11 DIAGNOSIS — R73.01 IMPAIRED FASTING GLUCOSE: Primary | ICD-10-CM

## 2021-06-11 LAB — HBA1C MFR BLD: 5.38 % (ref 4.8–5.6)

## 2021-06-11 PROCEDURE — 36415 COLL VENOUS BLD VENIPUNCTURE: CPT

## 2021-06-11 PROCEDURE — 83036 HEMOGLOBIN GLYCOSYLATED A1C: CPT

## 2021-06-18 ENCOUNTER — HOSPITAL ENCOUNTER (OUTPATIENT)
Dept: MAMMOGRAPHY | Facility: HOSPITAL | Age: 56
Discharge: HOME | End: 2021-06-18
Admitting: NURSE PRACTITIONER

## 2021-06-18 ENCOUNTER — TELEPHONE (OUTPATIENT)
Dept: FAMILY MEDICINE CLINIC | Facility: CLINIC | Age: 56
End: 2021-06-18

## 2021-06-18 DIAGNOSIS — Z12.31 SCREENING MAMMOGRAM, ENCOUNTER FOR: ICD-10-CM

## 2021-06-18 LAB
ALBUMIN SERPL-MCNC: 4.6 G/DL (ref 3.5–5.2)
ALBUMIN/GLOB SERPL: 1.7 G/DL
ALP SERPL-CCNC: 82 U/L (ref 39–117)
ALT SERPL W P-5'-P-CCNC: 29 U/L (ref 1–33)
ANION GAP SERPL CALCULATED.3IONS-SCNC: 11.9 MMOL/L (ref 5–15)
AST SERPL-CCNC: 21 U/L (ref 1–32)
BASOPHILS # BLD AUTO: 0.05 10*3/MM3 (ref 0–0.2)
BASOPHILS NFR BLD AUTO: 0.5 % (ref 0–1.5)
BILIRUB SERPL-MCNC: 0.7 MG/DL (ref 0–1.2)
BILIRUB UR QL STRIP: NEGATIVE
BUN SERPL-MCNC: 13 MG/DL (ref 6–20)
BUN/CREAT SERPL: 16.9 (ref 7–25)
CALCIUM SPEC-SCNC: 10.1 MG/DL (ref 8.6–10.5)
CHLORIDE SERPL-SCNC: 105 MMOL/L (ref 98–107)
CLARITY UR: ABNORMAL
CO2 SERPL-SCNC: 21.1 MMOL/L (ref 22–29)
COLOR UR: YELLOW
CREAT SERPL-MCNC: 0.77 MG/DL (ref 0.57–1)
DEPRECATED RDW RBC AUTO: 43.4 FL (ref 37–54)
EOSINOPHIL # BLD AUTO: 0.11 10*3/MM3 (ref 0–0.4)
EOSINOPHIL NFR BLD AUTO: 1.1 % (ref 0.3–6.2)
ERYTHROCYTE [DISTWIDTH] IN BLOOD BY AUTOMATED COUNT: 13.4 % (ref 12.3–15.4)
GFR SERPL CREATININE-BSD FRML MDRD: 77 ML/MIN/1.73
GLOBULIN UR ELPH-MCNC: 2.7 GM/DL
GLUCOSE SERPL-MCNC: 95 MG/DL (ref 65–99)
GLUCOSE UR STRIP-MCNC: NEGATIVE MG/DL
HCT VFR BLD AUTO: 42.3 % (ref 34–46.6)
HGB BLD-MCNC: 14.1 G/DL (ref 12–15.9)
HGB UR QL STRIP.AUTO: NEGATIVE
HOLD SPECIMEN: NORMAL
HOLD SPECIMEN: NORMAL
IMM GRANULOCYTES # BLD AUTO: 0.04 10*3/MM3 (ref 0–0.05)
IMM GRANULOCYTES NFR BLD AUTO: 0.4 % (ref 0–0.5)
KETONES UR QL STRIP: NEGATIVE
LEUKOCYTE ESTERASE UR QL STRIP.AUTO: NEGATIVE
LIPASE SERPL-CCNC: 31 U/L (ref 13–60)
LYMPHOCYTES # BLD AUTO: 1.99 10*3/MM3 (ref 0.7–3.1)
LYMPHOCYTES NFR BLD AUTO: 20.1 % (ref 19.6–45.3)
MCH RBC QN AUTO: 29.3 PG (ref 26.6–33)
MCHC RBC AUTO-ENTMCNC: 33.3 G/DL (ref 31.5–35.7)
MCV RBC AUTO: 87.9 FL (ref 79–97)
MONOCYTES # BLD AUTO: 0.87 10*3/MM3 (ref 0.1–0.9)
MONOCYTES NFR BLD AUTO: 8.8 % (ref 5–12)
NEUTROPHILS NFR BLD AUTO: 6.86 10*3/MM3 (ref 1.7–7)
NEUTROPHILS NFR BLD AUTO: 69.1 % (ref 42.7–76)
NITRITE UR QL STRIP: NEGATIVE
NRBC BLD AUTO-RTO: 0 /100 WBC (ref 0–0.2)
PH UR STRIP.AUTO: 5.5 [PH] (ref 5–8)
PLATELET # BLD AUTO: 295 10*3/MM3 (ref 140–450)
PMV BLD AUTO: 11.3 FL (ref 6–12)
POTASSIUM SERPL-SCNC: 3.8 MMOL/L (ref 3.5–5.2)
PROT SERPL-MCNC: 7.3 G/DL (ref 6–8.5)
PROT UR QL STRIP: NEGATIVE
RBC # BLD AUTO: 4.81 10*6/MM3 (ref 3.77–5.28)
SODIUM SERPL-SCNC: 138 MMOL/L (ref 136–145)
SP GR UR STRIP: 1.02 (ref 1–1.03)
UROBILINOGEN UR QL STRIP: ABNORMAL
WBC # BLD AUTO: 9.92 10*3/MM3 (ref 3.4–10.8)
WHOLE BLOOD HOLD SPECIMEN: NORMAL

## 2021-06-18 PROCEDURE — 96372 THER/PROPH/DIAG INJ SC/IM: CPT

## 2021-06-18 PROCEDURE — 77063 BREAST TOMOSYNTHESIS BI: CPT

## 2021-06-18 PROCEDURE — 99282 EMERGENCY DEPT VISIT SF MDM: CPT

## 2021-06-18 PROCEDURE — 83690 ASSAY OF LIPASE: CPT

## 2021-06-18 PROCEDURE — 81003 URINALYSIS AUTO W/O SCOPE: CPT

## 2021-06-18 PROCEDURE — 80053 COMPREHEN METABOLIC PANEL: CPT

## 2021-06-18 PROCEDURE — 77067 SCR MAMMO BI INCL CAD: CPT

## 2021-06-18 PROCEDURE — 85025 COMPLETE CBC W/AUTO DIFF WBC: CPT

## 2021-06-18 RX ORDER — SODIUM CHLORIDE 0.9 % (FLUSH) 0.9 %
10 SYRINGE (ML) INJECTION AS NEEDED
Status: DISCONTINUED | OUTPATIENT
Start: 2021-06-18 | End: 2021-06-19 | Stop reason: HOSPADM

## 2021-06-18 NOTE — TELEPHONE ENCOUNTER
Caller: Jewels Beltrán    Relationship: Self    Best call back number: [phone]    What medications are you currently taking:   Current Outpatient Medications on File Prior to Visit   Medication Sig Dispense Refill   • aspirin 325 MG tablet Take 325 mg by mouth daily.     • lisinopril (PRINIVIL,ZESTRIL) 10 MG tablet Take 10 mg by mouth Daily.     • meloxicam (MOBIC) 15 MG tablet take 1 tablet by mouth once daily DISCONTINUE DICLOFENAC  0     No current facility-administered medications on file prior to visit.        Which medication are you concerned about: PATIENT IS CONCERNED WITH THE COMBINATION OF LISINOPRIL AND MELOXICAM.  PATIENT STATES SHE HAS BEEN TAKING THE LISINOPRIL FOR A MONTH.    PATIENT STATES SHE STARTING HAVING CHEST PAINS EARLIER THIS WEEK, WENT TO URGENT CARE, EKG WAS FINE.  PATIENT REPORTS SHE HAS STOPPED TAKING THE MELOXICAM AS OF 06/15/2021, BECAUSE NOW IS HAVING SOME STOMACH ISSUES, WITH TIGHTNESS.     PATIENT WOULD LIKE A CALL BACK, SHE WILL BE GOING OUT OF TOWN THIS Sunday 06/20/2021.

## 2021-06-18 NOTE — ED TRIAGE NOTES
PT STATES STARTED HAVING ABD PAIN ON Monday. CRAMP LIKE FEELING TO ENTIRE ABD. PT STATES CRAMPING IS INTERMITTENT. DENIES ANY N/V AT THIS TIME.

## 2021-06-19 ENCOUNTER — APPOINTMENT (OUTPATIENT)
Dept: CT IMAGING | Facility: HOSPITAL | Age: 56
End: 2021-06-19

## 2021-06-19 ENCOUNTER — HOSPITAL ENCOUNTER (EMERGENCY)
Facility: HOSPITAL | Age: 56
Discharge: HOME | End: 2021-06-19
Attending: EMERGENCY MEDICINE | Admitting: EMERGENCY MEDICINE

## 2021-06-19 VITALS
HEIGHT: 66 IN | DIASTOLIC BLOOD PRESSURE: 95 MMHG | TEMPERATURE: 98.9 F | SYSTOLIC BLOOD PRESSURE: 157 MMHG | WEIGHT: 238.1 LBS | RESPIRATION RATE: 18 BRPM | HEART RATE: 73 BPM | BODY MASS INDEX: 38.27 KG/M2 | OXYGEN SATURATION: 99 %

## 2021-06-19 DIAGNOSIS — R10.30 LOWER ABDOMINAL PAIN: Primary | ICD-10-CM

## 2021-06-19 DIAGNOSIS — N83.201 OVARIAN CYST, RIGHT: ICD-10-CM

## 2021-06-19 PROCEDURE — 96372 THER/PROPH/DIAG INJ SC/IM: CPT

## 2021-06-19 PROCEDURE — 0 IOPAMIDOL PER 1 ML: Performed by: EMERGENCY MEDICINE

## 2021-06-19 PROCEDURE — 74177 CT ABD & PELVIS W/CONTRAST: CPT

## 2021-06-19 PROCEDURE — 25010000002 DICYCLOMINE PER 20 MG: Performed by: NURSE PRACTITIONER

## 2021-06-19 RX ORDER — DICYCLOMINE HCL 20 MG
20 TABLET ORAL EVERY 6 HOURS
Qty: 20 TABLET | Refills: 0 | Status: SHIPPED | OUTPATIENT
Start: 2021-06-19 | End: 2021-09-29

## 2021-06-19 RX ORDER — DICYCLOMINE HYDROCHLORIDE 10 MG/ML
20 INJECTION INTRAMUSCULAR ONCE
Status: COMPLETED | OUTPATIENT
Start: 2021-06-19 | End: 2021-06-19

## 2021-06-19 RX ADMIN — DICYCLOMINE HYDROCHLORIDE 20 MG: 20 INJECTION, SOLUTION INTRAMUSCULAR at 01:38

## 2021-06-19 RX ADMIN — IOPAMIDOL 100 ML: 755 INJECTION, SOLUTION INTRAVENOUS at 03:18

## 2021-06-19 NOTE — DISCHARGE INSTRUCTIONS
Follow up with your OB/GYN for further evaluation related to your ovarian cyst and lower abdominal pain. Return to the ER for worsening pain or any concerns.

## 2021-06-19 NOTE — ED PROVIDER NOTES
Subjective   Pt reports abdominal pain since Monday that is worsening. It is located in the lower abdomen and she describes the pain as being similar to the beginning of a contraction during labor. She denies trauma, N/V/D, vaginal bleeding, constipation or other symptoms.       History provided by:  Patient  Abdominal Pain  Pain location:  LLQ, RLQ and suprapubic  Pain quality: cramping and squeezing    Pain radiates to:  Does not radiate  Pain severity:  Moderate  Onset quality:  Gradual  Duration:  5 days  Timing:  Intermittent  Progression:  Worsening  Chronicity:  New  Context: not sick contacts    Relieved by:  Nothing  Worsened by:  Nothing  Ineffective treatments:  None tried  Associated symptoms: no chest pain, no chills, no constipation, no cough, no diarrhea, no fever, no hematuria, no nausea, no shortness of breath, no sore throat, no vaginal bleeding, no vaginal discharge and no vomiting        Review of Systems   Constitutional: Negative for chills and fever.   HENT: Negative for congestion, ear pain and sore throat.    Eyes: Negative for pain.   Respiratory: Negative for cough, chest tightness and shortness of breath.    Cardiovascular: Negative for chest pain.   Gastrointestinal: Positive for abdominal pain. Negative for constipation, diarrhea, nausea and vomiting.   Genitourinary: Negative for flank pain, hematuria, vaginal bleeding and vaginal discharge.   Musculoskeletal: Negative for joint swelling.   Skin: Negative for pallor.   Neurological: Negative for seizures and headaches.   All other systems reviewed and are negative.      Past Medical History:   Diagnosis Date   • Anxiety    • Arthritis    • Hypertension    • Thyroid disorder        Allergies   Allergen Reactions   • No Known Drug Allergy        Past Surgical History:   Procedure Laterality Date   • ABDOMINAL WALL ABSCESS INCISION AND DRAINAGE     • COLONOSCOPY     • TUBAL ABDOMINAL LIGATION         Family History   Problem Relation Age  of Onset   • Diabetes Mother    • Cancer Mother    • Arthritis Mother    • Heart disease Father        Social History     Socioeconomic History   • Marital status:      Spouse name: Not on file   • Number of children: Not on file   • Years of education: Not on file   • Highest education level: Not on file   Tobacco Use   • Smoking status: Current Every Day Smoker     Packs/day: 1.00   • Smokeless tobacco: Never Used   • Tobacco comment: SMOKED 21-30 YEARS   Substance and Sexual Activity   • Alcohol use: Yes     Comment: DRINKS MONTHLY BEER AND LIQUOR   • Drug use: Never   • Sexual activity: Defer           Objective   Physical Exam  Vitals and nursing note reviewed.   Constitutional:       General: She is not in acute distress.     Appearance: Normal appearance. She is not toxic-appearing.   HENT:      Head: Normocephalic and atraumatic.      Mouth/Throat:      Mouth: Mucous membranes are moist.   Eyes:      Extraocular Movements: Extraocular movements intact.      Pupils: Pupils are equal, round, and reactive to light.   Cardiovascular:      Rate and Rhythm: Normal rate and regular rhythm.      Pulses: Normal pulses.      Heart sounds: Normal heart sounds.   Pulmonary:      Effort: Pulmonary effort is normal. No respiratory distress.      Breath sounds: Normal breath sounds.   Abdominal:      General: Abdomen is flat. Bowel sounds are normal.      Palpations: Abdomen is soft.      Tenderness: There is no abdominal tenderness.   Musculoskeletal:         General: Normal range of motion.      Cervical back: Normal range of motion and neck supple.   Skin:     General: Skin is warm and dry.   Neurological:      Mental Status: She is alert and oriented to person, place, and time. Mental status is at baseline.         Procedures           ED Course  ED Course as of Jun 19 0714   Sat Jun 19, 2021   0400 Discussed CT results with pt and need to follow up with OB/GYN. Pt is in no acute distress, was resting  comfortably. Stable for discharge with PCP or OB/GYN.     [CM]      ED Course User Index  [CM] Osmany Ramos, APRN                                           MDM  Number of Diagnoses or Management Options     Amount and/or Complexity of Data Reviewed  Clinical lab tests: reviewed and ordered  Tests in the radiology section of CPT®: reviewed and ordered    Patient Progress  Patient progress: stable      Final diagnoses:   Lower abdominal pain   Ovarian cyst, right       ED Disposition  ED Disposition     ED Disposition Condition Comment    Discharge Stable           Ashly Andrew, APRN  [address] [facility] [address]  [place] [address]  [phone]    In 2 days  As needed         Medication List      New Prescriptions    dicyclomine 20 MG tablet  Commonly known as: BENTYL  Take 1 tablet by mouth Every 6 (Six) Hours.           Where to Get Your Medications      These medications were sent to [id number] - [place], [place] - [address] AT [facility], [facility] [phone]  - [phone] FX  [address] [place] [address]    Phone: [phone]   · dicyclomine 20 MG tablet          Osmany Ramos, APRN  06/19/21 0796

## 2021-06-21 ENCOUNTER — TELEPHONE (OUTPATIENT)
Dept: FAMILY MEDICINE CLINIC | Facility: CLINIC | Age: 56
End: 2021-06-21

## 2021-06-30 ENCOUNTER — OFFICE VISIT (OUTPATIENT)
Dept: FAMILY MEDICINE CLINIC | Facility: CLINIC | Age: 56
End: 2021-06-30

## 2021-06-30 VITALS
BODY MASS INDEX: 37.61 KG/M2 | DIASTOLIC BLOOD PRESSURE: 96 MMHG | HEART RATE: 80 BPM | SYSTOLIC BLOOD PRESSURE: 130 MMHG | RESPIRATION RATE: 18 BRPM | OXYGEN SATURATION: 99 % | WEIGHT: 234 LBS | HEIGHT: 66 IN

## 2021-06-30 DIAGNOSIS — R10.9 ABDOMINAL PAIN, UNSPECIFIED ABDOMINAL LOCATION: ICD-10-CM

## 2021-06-30 DIAGNOSIS — M25.561 RIGHT KNEE PAIN, UNSPECIFIED CHRONICITY: Primary | ICD-10-CM

## 2021-06-30 DIAGNOSIS — I10 ESSENTIAL HYPERTENSION: Primary | ICD-10-CM

## 2021-06-30 DIAGNOSIS — M19.90 ARTHRITIS: ICD-10-CM

## 2021-06-30 PROCEDURE — 99213 OFFICE O/P EST LOW 20 MIN: CPT | Performed by: NURSE PRACTITIONER

## 2021-06-30 RX ORDER — MELOXICAM 15 MG/1
15 TABLET ORAL DAILY
Qty: 90 TABLET | Refills: 1 | Status: SHIPPED | OUTPATIENT
Start: 2021-06-30 | End: 2021-09-29

## 2021-06-30 RX ORDER — LISINOPRIL 10 MG/1
10 TABLET ORAL DAILY
Qty: 90 TABLET | Refills: 1 | Status: SHIPPED | OUTPATIENT
Start: 2021-06-30 | End: 2021-08-11 | Stop reason: SDUPTHER

## 2021-06-30 NOTE — PROGRESS NOTES
Chief Complaint  Hypertension    Subjective          Jewels Beltrán presents to [facility] for   History of Present Illness    Patient is here for a follow up.    Patient stopped taking Meloxicam because she thought she was having a reaction to it and the Lisinopril mixed together.  States she went to the ER for abdominal pain, had labs and a CT, other than ovarian cyst nothing abnormal was found.  Patient states it did not feel like an upset stomach, states it felt like contractions during labor.  Since stopping the meloxicam patient is having right knee pain, chronic.  States the pain in her abdomen resolved on its own and has not reoccurred since.  History of hypertension-currently well controlled, blood pressure recheck 132/80  Medical History  Past Medical History:   Diagnosis Date   • Anxiety    • Arthritis    • Hypertension    • Thyroid disorder      Surgical History  Past Surgical History:   Procedure Laterality Date   • ABDOMINAL WALL ABSCESS INCISION AND DRAINAGE     • COLONOSCOPY     • TUBAL ABDOMINAL LIGATION       Social History  Social History     Socioeconomic History   • Marital status:      Spouse name: Not on file   • Number of children: Not on file   • Years of education: Not on file   • Highest education level: Not on file   Tobacco Use   • Smoking status: Current Every Day Smoker     Packs/day: 1.00   • Smokeless tobacco: Never Used   • Tobacco comment: SMOKED 21-30 YEARS   Vaping Use   • Vaping Use: Never used   Substance and Sexual Activity   • Alcohol use: Yes     Comment: DRINKS MONTHLY BEER AND LIQUOR   • Drug use: Never   • Sexual activity: Defer       Current Outpatient Medications:   •  aspirin 325 MG tablet, Take 325 mg by mouth daily., Disp: , Rfl:   •  lisinopril (PRINIVIL,ZESTRIL) 10 MG tablet, Take 1 tablet by mouth Daily., Disp: 90 tablet, Rfl: 1  •  dicyclomine (BENTYL) 20 MG tablet, Take 1 tablet by mouth Every 6 (Six) Hours., Disp: 20  "tablet, Rfl: 0  •  meloxicam (MOBIC) 15 MG tablet, Take 1 tablet by mouth Daily., Disp: 90 tablet, Rfl: 1    Review of Systems     Objective     /96 (BP Location: Left arm, Patient Position: Sitting, Cuff Size: Adult)   Pulse 80   Resp 18   Ht 167.6 cm (66\")   Wt 106 kg (234 lb)   SpO2 99%   BMI 37.77 kg/m²     Body mass index is 37.77 kg/m².    BP re-check 132/80.       Physical Exam  Vitals reviewed.   Constitutional:       Appearance: Normal appearance. She is well-developed.   HENT:      Head: Normocephalic and atraumatic.      Right Ear: External ear normal.      Left Ear: External ear normal.   Eyes:      Conjunctiva/sclera: Conjunctivae normal.      Pupils: Pupils are equal, round, and reactive to light.   Cardiovascular:      Rate and Rhythm: Normal rate and regular rhythm.      Heart sounds: No murmur heard.   No friction rub. No gallop.    Pulmonary:      Effort: Pulmonary effort is normal.      Breath sounds: Normal breath sounds. No wheezing or rhonchi.   Abdominal:      General: Bowel sounds are normal. There is no distension.      Palpations: Abdomen is soft.      Tenderness: There is no abdominal tenderness.   Skin:     General: Skin is warm and dry.   Neurological:      Mental Status: She is alert and oriented to person, place, and time.      Cranial Nerves: No cranial nerve deficit.   Psychiatric:         Mood and Affect: Mood and affect normal.         Behavior: Behavior normal.         Thought Content: Thought content normal.         Judgment: Judgment normal.         Result Review :     The following data was reviewed by: AYAN Garzon on 06/30/2021:    CMP    CMP 5/20/21 6/18/21   Glucose  95   Glucose 103 (A)    BUN 21 13   Creatinine 0.76 0.77   eGFR Non African Am  77   Sodium 140 138   Potassium 4.1 3.8   Chloride 108 105   Calcium 9.9 10.1   Albumin 4.1 4.60   Total Bilirubin 0.64 0.7   Alkaline Phosphatase 80 82   AST (SGOT) 18 21   ALT (SGPT) 22 29   (A) Abnormal " value            CBC    CBC 5/20/21 6/18/21   WBC 7.59 9.92   RBC 4.91 4.81   Hemoglobin 14.2 14.1   Hematocrit 44.0 42.3   MCV 89.6 87.9   MCH 28.9 29.3   MCHC 32.3 (A) 33.3   RDW 13.6 13.4   Platelets 294 295   (A) Abnormal value              Data reviewed: Radiologic studies CT abdomen pelvis done in ER                 Assessment:  Diagnoses and all orders for this visit:    1. Essential hypertension (Primary)  -     lisinopril (PRINIVIL,ZESTRIL) 10 MG tablet; Take 1 tablet by mouth Daily.  Dispense: 90 tablet; Refill: 1    2. Arthritis  -     meloxicam (MOBIC) 15 MG tablet; Take 1 tablet by mouth Daily.  Dispense: 90 tablet; Refill: 1    3. Abdominal pain, unspecified abdominal location        Plan:     Abdominal pain is resolved, I recommend that patient try restarting the meloxicam as it may have just been a coincidence in timing.  If pain returns Stop the medication and follow-up, we may try topical for voltaren gel as her knee is the main issue.              Follow Up     Return in about 3 months (around 9/30/2021).    Patient was given instructions and counseling regarding her condition or for health maintenance advice. Please see specific information pulled into the AVS if appropriate.     Ashly Andrew, AYAN  06/30/2021

## 2021-07-01 RX ORDER — MELOXICAM 15 MG/1
TABLET ORAL
Qty: 30 TABLET | Refills: 1 | OUTPATIENT
Start: 2021-07-01

## 2021-07-07 PROCEDURE — 85025 COMPLETE CBC W/AUTO DIFF WBC: CPT | Performed by: EMERGENCY MEDICINE

## 2021-07-07 PROCEDURE — 80048 BASIC METABOLIC PNL TOTAL CA: CPT | Performed by: EMERGENCY MEDICINE

## 2021-07-13 ENCOUNTER — TELEPHONE (OUTPATIENT)
Dept: FAMILY MEDICINE CLINIC | Facility: CLINIC | Age: 56
End: 2021-07-13

## 2021-07-15 VITALS
DIASTOLIC BLOOD PRESSURE: 97 MMHG | SYSTOLIC BLOOD PRESSURE: 164 MMHG | WEIGHT: 241.5 LBS | TEMPERATURE: 97.9 F | HEART RATE: 85 BPM | HEIGHT: 66 IN | SYSTOLIC BLOOD PRESSURE: 162 MMHG | BODY MASS INDEX: 38.81 KG/M2 | DIASTOLIC BLOOD PRESSURE: 88 MMHG | OXYGEN SATURATION: 98 %

## 2021-08-05 ENCOUNTER — HOSPITAL ENCOUNTER (EMERGENCY)
Facility: HOSPITAL | Age: 56
Discharge: HOME | End: 2021-08-05
Attending: EMERGENCY MEDICINE | Admitting: EMERGENCY MEDICINE

## 2021-08-05 ENCOUNTER — APPOINTMENT (OUTPATIENT)
Dept: CT IMAGING | Facility: HOSPITAL | Age: 56
End: 2021-08-05

## 2021-08-05 VITALS
DIASTOLIC BLOOD PRESSURE: 95 MMHG | HEART RATE: 78 BPM | TEMPERATURE: 97.6 F | WEIGHT: 234.79 LBS | RESPIRATION RATE: 20 BRPM | OXYGEN SATURATION: 99 % | HEIGHT: 66 IN | BODY MASS INDEX: 37.73 KG/M2 | SYSTOLIC BLOOD PRESSURE: 164 MMHG

## 2021-08-05 DIAGNOSIS — I10 ESSENTIAL HYPERTENSION: ICD-10-CM

## 2021-08-05 DIAGNOSIS — G44.209 ACUTE NON INTRACTABLE TENSION-TYPE HEADACHE: Primary | ICD-10-CM

## 2021-08-05 PROCEDURE — 70450 CT HEAD/BRAIN W/O DYE: CPT

## 2021-08-05 PROCEDURE — 99283 EMERGENCY DEPT VISIT LOW MDM: CPT

## 2021-08-06 NOTE — DISCHARGE INSTRUCTIONS
Rest, drink plenty of fluids.  Watch your daily salt intake.  Record your blood pressure readings daily and follow-up with AYAN Alba next week to discuss your blood pressure readings and your recent symptoms for further evaluation and treatment.  Return to the emergency department for any acutely developing neurological symptoms, any persistent vomiting, or any new or worse concerns.

## 2021-08-06 NOTE — ED PROVIDER NOTES
"Subjective   The patient presents to the emergency department complaining of right temporal headache but she states she has been having since about 10:00 this morning.  She states that she is having several symptoms with this headache.  She reports that she has been having these for \"a while\".  When asked how long that was she stated since May.  She states that she has had several episodes where she will start with the right temporal pressure.  She states that then she starts having pain down into her right shoulder.  She states that goes into her mid back and then states it will end up with her feeling as if she is having abdominal tightness.  She states that she has been trying to manage her blood pressure does report some dizziness when these episodes occur.  She reports that she feels that when her blood pressure is elevated.  She states normally these \"episodes\" resolved rather quickly before but states today is continued for hours.  On exam the patient states that she feels better at this time.  She states that she has seen her primary care provider concerning this matter and also been seen at the urgent care previously.  She has a grossly intact neuro exam.  She denies any recent fevers.  She denies any recent head trauma.  She is alert and oriented.          Review of Systems   Constitutional: Negative for chills and fever.   HENT: Negative for congestion, ear pain and sore throat.    Eyes: Negative for pain.   Respiratory: Negative for cough, chest tightness and shortness of breath.    Cardiovascular: Negative for chest pain.   Gastrointestinal: Negative for abdominal pain (ABD TIGHTNESS (RESOLVED)), diarrhea, nausea and vomiting.   Genitourinary: Negative for flank pain and hematuria.   Musculoskeletal: Positive for back pain. Negative for joint swelling.   Skin: Negative for pallor.   Neurological: Positive for light-headedness and headaches. Negative for seizures.   All other systems reviewed and are " negative.      Past Medical History:   Diagnosis Date   • Anxiety    • Arthritis    • Hypertension        Allergies   Allergen Reactions   • No Known Drug Allergy        Past Surgical History:   Procedure Laterality Date   • COLONOSCOPY         Family History   Problem Relation Age of Onset   • Diabetes Mother    • Cancer Mother    • Arthritis Mother    • Heart disease Father        Social History     Socioeconomic History   • Marital status:      Spouse name: Not on file   • Number of children: Not on file   • Years of education: Not on file   • Highest education level: Not on file   Tobacco Use   • Smoking status: Current Some Day Smoker     Packs/day: 0.50   • Smokeless tobacco: Current User   • Tobacco comment: SMOKED 21-30 YEARS   Vaping Use   • Vaping Use: Never used   Substance and Sexual Activity   • Alcohol use: Yes     Comment: DRINKS MONTHLY BEER AND LIQUOR   • Drug use: Never   • Sexual activity: Defer           Objective   Physical Exam  Vitals and nursing note reviewed.   Constitutional:       General: She is not in acute distress.     Appearance: Normal appearance. She is not ill-appearing or toxic-appearing.   HENT:      Head: Normocephalic and atraumatic.   Eyes:      Extraocular Movements: Extraocular movements intact.      Pupils: Pupils are equal, round, and reactive to light.   Cardiovascular:      Rate and Rhythm: Normal rate and regular rhythm.      Pulses: Normal pulses.   Pulmonary:      Effort: Pulmonary effort is normal. No respiratory distress.   Abdominal:      General: Abdomen is flat.      Palpations: Abdomen is soft.      Tenderness: There is no abdominal tenderness.   Musculoskeletal:         General: Normal range of motion.      Cervical back: Normal range of motion and neck supple.   Skin:     General: Skin is warm and dry.      Capillary Refill: Capillary refill takes less than 2 seconds.   Neurological:      General: No focal deficit present.      Mental Status: She is  alert and oriented to person, place, and time. Mental status is at baseline.         Procedures           ED Course                                           MDM  Number of Diagnoses or Management Options  Acute non intractable tension-type headache: new and requires workup  Essential hypertension: established and improving     Amount and/or Complexity of Data Reviewed  Tests in the radiology section of CPT®: reviewed    Risk of Complications, Morbidity, and/or Mortality  Presenting problems: low  Diagnostic procedures: low  Management options: low    Patient Progress  Patient progress: stable      Final diagnoses:   Acute non intractable tension-type headache   Essential hypertension       ED Disposition  ED Disposition     ED Disposition Condition Comment    Discharge Stable           Ashly Andrew, APRN  [address] [address]  [phone]    In 2 days  FOR FOLLOW UP         Medication List      No changes were made to your prescriptions during this visit.          Griselda Mueller, AYAN  08/05/21 5898

## 2021-08-11 ENCOUNTER — OFFICE VISIT (OUTPATIENT)
Dept: FAMILY MEDICINE CLINIC | Facility: CLINIC | Age: 56
End: 2021-08-11

## 2021-08-11 VITALS
HEART RATE: 82 BPM | HEIGHT: 66 IN | SYSTOLIC BLOOD PRESSURE: 147 MMHG | OXYGEN SATURATION: 98 % | BODY MASS INDEX: 37.25 KG/M2 | DIASTOLIC BLOOD PRESSURE: 90 MMHG | WEIGHT: 231.8 LBS

## 2021-08-11 DIAGNOSIS — R07.89 CHEST TIGHTNESS: Primary | ICD-10-CM

## 2021-08-11 DIAGNOSIS — F17.200 SMOKER: ICD-10-CM

## 2021-08-11 DIAGNOSIS — I10 ESSENTIAL HYPERTENSION: ICD-10-CM

## 2021-08-11 PROCEDURE — 99406 BEHAV CHNG SMOKING 3-10 MIN: CPT | Performed by: NURSE PRACTITIONER

## 2021-08-11 PROCEDURE — 99214 OFFICE O/P EST MOD 30 MIN: CPT | Performed by: NURSE PRACTITIONER

## 2021-08-11 RX ORDER — LISINOPRIL 20 MG/1
20 TABLET ORAL DAILY
Qty: 30 TABLET | Refills: 1 | Status: SHIPPED | OUTPATIENT
Start: 2021-08-11 | End: 2021-09-29

## 2021-08-11 NOTE — PROGRESS NOTES
Chief Complaint  Hypertension     Subjective          Jewels Beltrán presents to [facility] for   History of Present Illness    Hypertension: Patient is taking Lisinopril 10mg daily, and has been taking her blood pressure at home, systolic is staying around the 150s.,  Diastolics 90s to 100. she states that she has been getting headaches here and there described as a piercing headache and pain in her right shoulder, flushing and dizzy. She went to the ER over this and when she arrived at the hospital, [facility], 193/106. They told her to follow up with PCP.  Patient did have a head CT done at that time which was normal.  Patient also states that she occasionally has chest tightness when she has these dizzy flush sensations.    Patient was seen for chest tightness and had EKG in urgent care July 9th, [facility]. Pt states has some episodes of chest tightness and would like to see cardiologist.  States she had a chest stress test 6 or so years ago that was normal.    Family hx pos for father with MI x2, states he was very young when he had his first.     Pt admits does smoke, is working on quitting. Previously smoking a pack per week, since starting to try quitting is smoking less. Has been working on quitting for a couple of weeks.     Denies any shortness of breath with the episodes, denies any chest pain at present.  Medical History  Past Medical History:   Diagnosis Date   • Anxiety    • Arthritis    • Hypertension      Surgical History  Past Surgical History:   Procedure Laterality Date   • COLONOSCOPY       Social History  Social History     Socioeconomic History   • Marital status:      Spouse name: Not on file   • Number of children: Not on file   • Years of education: Not on file   • Highest education level: Not on file   Tobacco Use   • Smoking status: Current Some Day Smoker     Packs/day: 0.50   • Smokeless tobacco: Never Used   • Tobacco comment: SMOKED 21-30 YEARS   Vaping  "Use   • Vaping Use: Never used   Substance and Sexual Activity   • Alcohol use: Yes     Comment: DRINKS MONTHLY BEER AND LIQUOR   • Drug use: Never   • Sexual activity: Defer       Current Outpatient Medications:   •  aspirin 325 MG tablet, Take 325 mg by mouth daily., Disp: , Rfl:   •  dicyclomine (BENTYL) 20 MG tablet, Take 1 tablet by mouth Every 6 (Six) Hours., Disp: 20 tablet, Rfl: 0  •  lisinopril (PRINIVIL,ZESTRIL) 20 MG tablet, Take 1 tablet by mouth Daily., Disp: 30 tablet, Rfl: 1  •  meloxicam (MOBIC) 15 MG tablet, Take 1 tablet by mouth Daily., Disp: 90 tablet, Rfl: 1    Review of Systems     Objective     /90   Pulse 82   Ht 167.6 cm (66\")   Wt 105 kg (231 lb 12.8 oz)   SpO2 98%   BMI 37.41 kg/m²     Body mass index is 37.41 kg/m².    Physical Exam  Vitals reviewed.   Constitutional:       Appearance: Normal appearance. She is well-developed.   HENT:      Head: Normocephalic and atraumatic.      Right Ear: External ear normal.      Left Ear: External ear normal.   Eyes:      Conjunctiva/sclera: Conjunctivae normal.      Pupils: Pupils are equal, round, and reactive to light.   Cardiovascular:      Rate and Rhythm: Normal rate and regular rhythm.      Heart sounds: No murmur heard.   No friction rub. No gallop.    Pulmonary:      Effort: Pulmonary effort is normal.      Breath sounds: Normal breath sounds. No wheezing or rhonchi.   Skin:     General: Skin is warm and dry.   Neurological:      Mental Status: She is alert and oriented to person, place, and time.      Cranial Nerves: No cranial nerve deficit.   Psychiatric:         Mood and Affect: Mood and affect normal.         Behavior: Behavior normal.         Thought Content: Thought content normal.         Judgment: Judgment normal.         Result Review :     The following data was reviewed by: AYAN Garzon on 08/11/2021:    CMP    CMP 5/20/21 6/18/21 7/7/21   Glucose  95 92   Glucose 103 (A)     BUN 21 13 17   Creatinine 0.76 " 0.77 0.90   eGFR Non African Am  77 65   Sodium 140 138 138   Potassium 4.1 3.8 4.3   Chloride 108 105 109 (A)   Calcium 9.9 10.1 10.1   Albumin 4.1 4.60    Total Bilirubin 0.64 0.7    Alkaline Phosphatase 80 82    AST (SGOT) 18 21    ALT (SGPT) 22 29    (A) Abnormal value            CBC    CBC 5/20/21 6/18/21 7/7/21   WBC 7.59 9.92 11.99 (A)   RBC 4.91 4.81 4.90   Hemoglobin 14.2 14.1 14.3   Hematocrit 44.0 42.3 42.7   MCV 89.6 87.9 87.1   MCH 28.9 29.3 29.2   MCHC 32.3 (A) 33.3 33.5   RDW 13.6 13.4 13.2   Platelets 294 295 322   (A) Abnormal value            Lipid Panel    Lipid Panel 5/20/21   Total Cholesterol 172   Triglycerides 123   HDL Cholesterol 40   VLDL Cholesterol 25   LDL Cholesterol  107 (A)   (A) Abnormal value       Comments are available for some flowsheets but are not being displayed.           TSH    TSH 5/20/21   TSH 1.490                              Assessment:  Diagnoses and all orders for this visit:    1. Chest tightness (Primary)  Assessment & Plan:  Patient is having episodes of chest tightness with some dizziness and poorly controlled hypertension.  Patient also has family history of MI in her father who while patient cannot give me his age, she states was very young when he had his first.  Patient is also a smoker, she is working on quitting.  Because of these risk factors I am referring her to cardiology for probable stress test and further evaluation.    Orders:  -     Ambulatory Referral to Cardiology    2. Essential hypertension  Assessment & Plan:  Hypertension is Not well controlled at present, needing medication adjustment today.  Lisinopril doubled from 10 mg daily to 20 mg daily.  Blood pressure will be reassessed in 4 weeks.    Orders:  -     lisinopril (PRINIVIL,ZESTRIL) 20 MG tablet; Take 1 tablet by mouth Daily.  Dispense: 30 tablet; Refill: 1  -     Ambulatory Referral to Cardiology    3. Smoker  -     Ambulatory Referral to Cardiology    Jewels Beltrán  reports that  she has been smoking. She has been smoking about 0.50 packs per day. She has never used smokeless tobacco.. I have educated her on the risk of diseases from using tobacco products such as cancer, COPD and heart disease.     I advised her to quit and she is willing to quit. We have discussed the following method/s for tobacco cessation:  OTC Cessation Products.  Together we have set a quit date for Patient states she is unable to set a specific timeline, states is going to take to time but she is going to do it on her own she is going to continue to work towards it..  She will follow up with me in 1 month or sooner to check on her progress.    I spent 3  minutes counseling the patient.            Follow Up     No follow-ups on file.    Patient was given instructions and counseling regarding her condition or for health maintenance advice. Please see specific information pulled into the AVS if appropriate.     Ashly Andrew, APRN  08/11/2021

## 2021-08-11 NOTE — ASSESSMENT & PLAN NOTE
Hypertension is Not well controlled at present, needing medication adjustment today.  Lisinopril doubled from 10 mg daily to 20 mg daily.  Blood pressure will be reassessed in 4 weeks.

## 2021-08-11 NOTE — ASSESSMENT & PLAN NOTE
Patient is having episodes of chest tightness with some dizziness and poorly controlled hypertension.  Patient also has family history of MI in her father who while patient cannot give me his age, she states was very young when he had his first.  Patient is also a smoker, she is working on quitting.  Because of these risk factors I am referring her to cardiology for probable stress test and further evaluation.

## 2021-09-01 ENCOUNTER — OFFICE VISIT (OUTPATIENT)
Dept: CARDIOLOGY | Facility: CLINIC | Age: 56
End: 2021-09-01

## 2021-09-01 VITALS
HEIGHT: 66 IN | HEART RATE: 80 BPM | SYSTOLIC BLOOD PRESSURE: 143 MMHG | BODY MASS INDEX: 36.48 KG/M2 | WEIGHT: 227 LBS | DIASTOLIC BLOOD PRESSURE: 83 MMHG

## 2021-09-01 DIAGNOSIS — I10 ESSENTIAL HYPERTENSION: ICD-10-CM

## 2021-09-01 DIAGNOSIS — R07.2 PRECORDIAL PAIN: Primary | ICD-10-CM

## 2021-09-01 PROCEDURE — 99406 BEHAV CHNG SMOKING 3-10 MIN: CPT | Performed by: INTERNAL MEDICINE

## 2021-09-01 PROCEDURE — 99204 OFFICE O/P NEW MOD 45 MIN: CPT | Performed by: INTERNAL MEDICINE

## 2021-09-01 RX ORDER — NICOTINE 21 MG/24HR
1 PATCH, TRANSDERMAL 24 HOURS TRANSDERMAL EVERY 24 HOURS
COMMUNITY
End: 2021-12-29

## 2021-09-01 NOTE — PROGRESS NOTES
Chief Complaint  Headache (at least once a week), Dizziness, and Hypertension    Subjective            Jewels Beltrán presents to [facility] CARDIOLOGY  History of Present Illness    55-year-old white female.  She has no previous cardiac history.  Since May she has had episodes of lower back pain more in the right flank, pressure type headache on the right side pain in between the shoulder blades vague chest discomfort and abdominal tightness.  Symptoms do not occur every day.  She is going to the emergency room a couple of times.  She eventually saw her primary care physician and with her blood pressure being elevated she was started on lisinopril with 1 dose change since then.  Her episodes are milder since then.  She denies significant palpitations.  She is a smoker but is taking nicotine patches to quit    PMH  Past Medical History:   Diagnosis Date   • Anxiety    • Arthritis    • Hypertension          SURGICALHX  Past Surgical History:   Procedure Laterality Date   • COLONOSCOPY          SOC  Social History     Socioeconomic History   • Marital status:      Spouse name: Not on file   • Number of children: Not on file   • Years of education: Not on file   • Highest education level: Not on file   Tobacco Use   • Smoking status: Current Every Day Smoker     Packs/day: 1.00     Types: Cigarettes     Start date: 1980   • Smokeless tobacco: Never Used   • Tobacco comment: SMOKED 21-30 YEARS   Vaping Use   • Vaping Use: Never used   Substance and Sexual Activity   • Alcohol use: Yes     Comment: DRINKS MONTHLY BEER AND LIQUOR   • Drug use: Never   • Sexual activity: Defer         FAMHX  Family History   Problem Relation Age of Onset   • Diabetes Mother    • Cancer Mother    • Arthritis Mother    • Heart disease Father           ALLERGY  Allergies   Allergen Reactions   • No Known Drug Allergy         MEDSCURRENT    Current Outpatient Medications:   •  aspirin 325 MG tablet, Take 325 mg by  "mouth daily., Disp: , Rfl:   •  lisinopril (PRINIVIL,ZESTRIL) 20 MG tablet, Take 1 tablet by mouth Daily., Disp: 30 tablet, Rfl: 1  •  nicotine (NICODERM CQ) 21 MG/24HR patch, Place 1 patch on the skin as directed by provider Daily., Disp: , Rfl:   •  dicyclomine (BENTYL) 20 MG tablet, Take 1 tablet by mouth Every 6 (Six) Hours., Disp: 20 tablet, Rfl: 0  •  meloxicam (MOBIC) 15 MG tablet, Take 1 tablet by mouth Daily., Disp: 90 tablet, Rfl: 1      Review of Systems   Constitutional: Negative.   Eyes: Negative.    Cardiovascular: Positive for chest pain.   Respiratory: Negative.    Endocrine: Negative.    Hematologic/Lymphatic: Negative.    Skin: Negative.    Musculoskeletal: Positive for back pain.   Gastrointestinal: Positive for abdominal pain.   Genitourinary: Negative.    Neurological: Positive for headaches.   Psychiatric/Behavioral: Negative.         Objective     /83   Pulse 80   Ht 167.6 cm (66\")   Wt 103 kg (227 lb)   BMI 36.64 kg/m²       General Appearance:   · well developed  · well nourished  HENT:   · oropharynx moist  · lips not cyanotic  Neck:  · thyroid not enlarged  · supple  Respiratory:  · no respiratory distress  · normal breath sounds  · no rales  Cardiovascular:  · no jugular venous distention  · regular rhythm  · apical impulse normal  · S1 normal, S2 normal  · no S3, no S4   · no murmur  · no rub, no thrill  · carotid pulses normal; no bruit  · pedal pulses normal  · lower extremity edema: none    Musculoskeletal:  · no clubbing of fingers.   · normocephalic, head atraumatic  Skin:   · warm, dry  Psychiatric:  · judgement and insight appropriate  · normal mood and affect      Result Review :     The following data was reviewed by: Kurtis Zayas MD on 09/01/2021:    CMP    CMP 5/20/21 6/18/21 7/7/21   Glucose  95 92   Glucose 103 (A)     BUN 21 13 17   Creatinine 0.76 0.77 0.90   eGFR Non African Am  77 65   Sodium 140 138 138   Potassium 4.1 3.8 4.3   Chloride 108 105 " 109 (A)   Calcium 9.9 10.1 10.1   Albumin 4.1 4.60    Total Bilirubin 0.64 0.7    Alkaline Phosphatase 80 82    AST (SGOT) 18 21    ALT (SGPT) 22 29    (A) Abnormal value            CBC    CBC 5/20/21 6/18/21 7/7/21   WBC 7.59 9.92 11.99 (A)   RBC 4.91 4.81 4.90   Hemoglobin 14.2 14.1 14.3   Hematocrit 44.0 42.3 42.7   MCV 89.6 87.9 87.1   MCH 28.9 29.3 29.2   MCHC 32.3 (A) 33.3 33.5   RDW 13.6 13.4 13.2   Platelets 294 295 322   (A) Abnormal value            Lipid Panel    Lipid Panel 5/20/21   Total Cholesterol 172   Triglycerides 123   HDL Cholesterol 40   VLDL Cholesterol 25   LDL Cholesterol  107 (A)   (A) Abnormal value       Comments are available for some flowsheets but are not being displayed.           TSH    TSH 5/20/21   TSH 1.490             Data reviewed: EKG personally reviewed by me from July 7, sinus rhythm, normal tracing, primary care records reviewed, emergency room records reviewed, chest x-ray previously normal     Procedures      Jewels Beltrán  reports that she has been smoking cigarettes. She started smoking about 41 years ago. She has been smoking about 1.00 pack per day. She has never used smokeless tobacco.. I have educated her on the risk of diseases from using tobacco products such as cancer, COPD and heart disease.     I advised her to quit and she is willing to quit. We have discussed the following method/s for tobacco cessation:  Education Material Counseling.  Together we have set a quit date for When she weans down to 0 cigarettes on nicotine patches.  She will follow up with me in several weeks or sooner to check on her progress.    I spent 3  minutes counseling the patient.                Assessment and Plan        ASSESSMENT:  Encounter Diagnoses   Name Primary?   • Precordial pain Yes   • Essential hypertension          PLAN:    1.  Ms. Kumar has atypical chest pain, this is possibly related to uncontrolled hypertension since the symptoms have been improved with  lisinopril.  An echocardiogram and treadmill stress test will be scheduled.  2.  Continue lisinopril for now, follow-up with PCP  3.  Lengthy discussion today regarding the approach to weight loss, including calorie target, measuring and tracking her calories and sodium intake  4.  Smoking cessation counseling          Patient was given instructions and counseling regarding her condition or for health maintenance advice. Please see specific information pulled into the AVS if appropriate.             PELON Zayas MD  9/1/2021    11:29 EDT

## 2021-09-01 NOTE — PATIENT INSTRUCTIONS
Calorie Counting for Weight Loss  Calories are units of energy. Your body needs a certain number of calories from food to keep going throughout the day. When you eat or drink more calories than your body needs, your body stores the extra calories mostly as fat. When you eat or drink fewer calories than your body needs, your body burns fat to get the energy it needs.  Calorie counting means keeping track of how many calories you eat and drink each day. Calorie counting can be helpful if you need to lose weight. If you eat fewer calories than your body needs, you should lose weight. Ask your health care provider what a healthy weight is for you.  For calorie counting to work, you will need to eat the right number of calories each day to lose a healthy amount of weight per week. A dietitian can help you figure out how many calories you need in a day and will suggest ways to reach your calorie goal.  · A healthy amount of weight to lose each week is usually 1-2 lb (0.5-0.9 kg). This usually means that your daily calorie intake should be reduced by 500-750 calories.  · Eating 1,200-1,500 calories a day can help most women lose weight.  · Eating 1,500-1,800 calories a day can help most men lose weight.  What do I need to know about calorie counting?  Work with your health care provider or dietitian to determine how many calories you should get each day. To meet your daily calorie goal, you will need to:  · Find out how many calories are in each food that you would like to eat. Try to do this before you eat.  · Decide how much of the food you plan to eat.  · Keep a food log. Do this by writing down what you ate and how many calories it had.  To successfully lose weight, it is important to balance calorie counting with a healthy lifestyle that includes regular activity.  Where do I find calorie information?    The number of calories in a food can be found on a Nutrition Facts label. If a food does not have a Nutrition Facts  label, try to look up the calories online or ask your dietitian for help.  Remember that calories are listed per serving. If you choose to have more than one serving of a food, you will have to multiply the calories per serving by the number of servings you plan to eat. For example, the label on a package of bread might say that a serving size is 1 slice and that there are 90 calories in a serving. If you eat 1 slice, you will have eaten 90 calories. If you eat 2 slices, you will have eaten 180 calories.  How do I keep a food log?  After each time that you eat, record the following in your food log as soon as possible:  · What you ate. Be sure to include toppings, sauces, and other extras on the food.  · How much you ate. This can be measured in cups, ounces, or number of items.  · How many calories were in each food and drink.  · The total number of calories in the food you ate.  Keep your food log near you, such as in a pocket-sized notebook or on an allegra or website on your mobile phone. Some programs will calculate calories for you and show you how many calories you have left to meet your daily goal.  What are some portion-control tips?  · Know how many calories are in a serving. This will help you know how many servings you can have of a certain food.  · Use a measuring cup to measure serving sizes. You could also try weighing out portions on a kitchen scale. With time, you will be able to estimate serving sizes for some foods.  · Take time to put servings of different foods on your favorite plates or in your favorite bowls and cups so you know what a serving looks like.  · Try not to eat straight from a food's packaging, such as from a bag or box. Eating straight from the package makes it hard to see how much you are eating and can lead to overeating. Put the amount you would like to eat in a cup or on a plate to make sure you are eating the right portion.  · Use smaller plates, glasses, and bowls for smaller  portions and to prevent overeating.  · Try not to multitask. For example, avoid watching TV or using your computer while eating. If it is time to eat, sit down at a table and enjoy your food. This will help you recognize when you are full. It will also help you be more mindful of what and how much you are eating.  What are tips for following this plan?  Reading food labels  · Check the calorie count compared with the serving size. The serving size may be smaller than what you are used to eating.  · Check the source of the calories. Try to choose foods that are high in protein, fiber, and vitamins, and low in saturated fat, trans fat, and sodium.  Shopping  · Read nutrition labels while you shop. This will help you make healthy decisions about which foods to buy.  · Pay attention to nutrition labels for low-fat or fat-free foods. These foods sometimes have the same number of calories or more calories than the full-fat versions. They also often have added sugar, starch, or salt to make up for flavor that was removed with the fat.  · Make a grocery list of lower-calorie foods and stick to it.  Cooking  · Try to cook your favorite foods in a healthier way. For example, try baking instead of frying.  · Use low-fat dairy products.  Meal planning  · Use more fruits and vegetables. One-half of your plate should be fruits and vegetables.  · Include lean proteins, such as chicken, turkey, and fish.  Lifestyle  Each week, aim to do one of the following:  · 150 minutes of moderate exercise, such as walking.  · 75 minutes of vigorous exercise, such as running.  General information  · Know how many calories are in the foods you eat most often. This will help you calculate calorie counts faster.  · Find a way of tracking calories that works for you. Get creative. Try different apps or programs if writing down calories does not work for you.  What foods should I eat?    · Eat nutritious foods. It is better to have a nutritious,  high-calorie food, such as an avocado, than a food with few nutrients, such as a bag of potato chips.  · Use your calories on foods and drinks that will fill you up and will not leave you hungry soon after eating.  ? Examples of foods that fill you up are nuts and nut butters, vegetables, lean proteins, and high-fiber foods such as whole grains. High-fiber foods are foods with more than 5 g of fiber per serving.  · Pay attention to calories in drinks. Low-calorie drinks include water and unsweetened drinks.  The items listed above may not be a complete list of foods and beverages you can eat. Contact a dietitian for more information.  What foods should I limit?  Limit foods or drinks that are not good sources of vitamins, minerals, or protein or that are high in unhealthy fats. These include:  · Candy.  · Other sweets.  · Sodas, specialty coffee drinks, alcohol, and juice.  The items listed above may not be a complete list of foods and beverages you should avoid. Contact a dietitian for more information.  How do I count calories when eating out?  · Pay attention to portions. Often, portions are much larger when eating out. Try these tips to keep portions smaller:  ? Consider sharing a meal instead of getting your own.  ? If you get your own meal, eat only half of it. Before you start eating, ask for a container and put half of your meal into it.  ? When available, consider ordering smaller portions from the menu instead of full portions.  · Pay attention to your food and drink choices. Knowing the way food is cooked and what is included with the meal can help you eat fewer calories.  ? If calories are listed on the menu, choose the lower-calorie options.  ? Choose dishes that include vegetables, fruits, whole grains, low-fat dairy products, and lean proteins.  ? Choose items that are boiled, broiled, grilled, or steamed. Avoid items that are buttered, battered, fried, or served with cream sauce. Items labeled as  crispy are usually fried, unless stated otherwise.  ? Choose water, low-fat milk, unsweetened iced tea, or other drinks without added sugar. If you want an alcoholic beverage, choose a lower-calorie option, such as a glass of wine or light beer.  ? Ask for dressings, sauces, and syrups on the side. These are usually high in calories, so you should limit the amount you eat.  ? If you want a salad, choose a garden salad and ask for grilled meats. Avoid extra toppings such as choi, cheese, or fried items. Ask for the dressing on the side, or ask for olive oil and vinegar or lemon to use as dressing.  · Estimate how many servings of a food you are given. Knowing serving sizes will help you be aware of how much food you are eating at restaurants.  Where to find more information  · Centers for Disease Control and Prevention: www.cdc.gov  · [place] Department of Agriculture: myplate.gov  Summary  · Calorie counting means keeping track of how many calories you eat and drink each day. If you eat fewer calories than your body needs, you should lose weight.  · A healthy amount of weight to lose per week is usually 1-2 lb (0.5-0.9 kg). This usually means reducing your daily calorie intake by 500-750 calories.  · The number of calories in a food can be found on a Nutrition Facts label. If a food does not have a Nutrition Facts label, try to look up the calories online or ask your dietitian for help.  · Use smaller plates, glasses, and bowls for smaller portions and to prevent overeating.  · Use your calories on foods and drinks that will fill you up and not leave you hungry shortly after a meal.  This information is not intended to replace advice given to you by your health care provider. Make sure you discuss any questions you have with your health care provider.  Document Revised: 01/28/2021 Document Reviewed: 01/28/2021  Elsevier Patient Education © 2021 [employer]      Low-Sodium Eating Plan  Sodium, which is an element  "that makes up salt, helps you maintain a healthy balance of fluids in your body. Too much sodium can increase your blood pressure and cause fluid and waste to be held in your body.  Your health care provider or dietitian may recommend following this plan if you have high blood pressure (hypertension), kidney disease, liver disease, or heart failure. Eating less sodium can help lower your blood pressure, reduce swelling, and protect your heart, liver, and kidneys.  What are tips for following this plan?  Reading food labels  · The Nutrition Facts label lists the amount of sodium in one serving of the food. If you eat more than one serving, you must multiply the listed amount of sodium by the number of servings.  · Choose foods with less than 140 mg of sodium per serving.  · Avoid foods with 300 mg of sodium or more per serving.  Shopping    · Look for lower-sodium products, often labeled as \"low-sodium\" or \"no salt added.\"  · Always check the sodium content, even if foods are labeled as \"unsalted\" or \"no salt added.\"  · Buy fresh foods.  ? Avoid canned foods and pre-made or frozen meals.  ? Avoid canned, cured, or processed meats.  · Buy breads that have less than 80 mg of sodium per slice.  Cooking    · Eat more home-cooked food and less restaurant, buffet, and fast food.  · Avoid adding salt when cooking. Use salt-free seasonings or herbs instead of table salt or sea salt. Check with your health care provider or pharmacist before using salt substitutes.  · Cook with plant-based oils, such as canola, sunflower, or olive oil.  Meal planning  · When eating at a restaurant, ask that your food be prepared with less salt or no salt, if possible. Avoid dishes labeled as brined, pickled, cured, smoked, or made with soy sauce, miso, or teriyaki sauce.  · Avoid foods that contain MSG (monosodium glutamate). MSG is sometimes added to Chinese food, bouillon, and some canned foods.  · Make meals that can be grilled, baked, " "poached, roasted, or steamed. These are generally made with less sodium.  General information  Most people on this plan should limit their sodium intake to 1,500-2,000 mg (milligrams) of sodium each day.  What foods should I eat?  Fruits  Fresh, frozen, or canned fruit. Fruit juice.  Vegetables  Fresh or frozen vegetables. \"No salt added\" canned vegetables. \"No salt added\" tomato sauce and paste. Low-sodium or reduced-sodium tomato and vegetable juice.  Grains  Low-sodium cereals, including oats, puffed wheat and rice, and shredded wheat. Low-sodium crackers. Unsalted rice. Unsalted pasta. Low-sodium bread. Whole-grain breads and whole-grain pasta.  Meats and other proteins  Fresh or frozen (no salt added) meat, poultry, seafood, and fish. Low-sodium canned tuna and salmon. Unsalted nuts. Dried peas, beans, and lentils without added salt. Unsalted canned beans. Eggs. Unsalted nut butters.  Dairy  Milk. Soy milk. Cheese that is naturally low in sodium, such as ricotta cheese, fresh mozzarella, or Swiss cheese. Low-sodium or reduced-sodium cheese. Cream cheese. Yogurt.  Seasonings and condiments  Fresh and dried herbs and spices. Salt-free seasonings. Low-sodium mustard and ketchup. Sodium-free salad dressing. Sodium-free light mayonnaise. Fresh or refrigerated horseradish. Lemon juice. Vinegar.  Other foods  Homemade, reduced-sodium, or low-sodium soups. Unsalted popcorn and pretzels. Low-salt or salt-free chips.  The items listed above may not be a complete list of foods and beverages you can eat. Contact a dietitian for more information.  What foods should I avoid?  Vegetables  Sauerkraut, pickled vegetables, and relishes. Olives. French fries. Onion rings. Regular canned vegetables (not low-sodium or reduced-sodium). Regular canned tomato sauce and paste (not low-sodium or reduced-sodium). Regular tomato and vegetable juice (not low-sodium or reduced-sodium). Frozen vegetables in sauces.  Grains  Instant hot " cereals. Bread stuffing, pancake, and biscuit mixes. Croutons. Seasoned rice or pasta mixes. Noodle soup cups. Boxed or frozen macaroni and cheese. Regular salted crackers. Self-rising flour.  Meats and other proteins  Meat or fish that is salted, canned, smoked, spiced, or pickled. Precooked or cured meat, such as sausages or meat loaves. Ramos. Ham. Pepperoni. Hot dogs. Corned beef. Chipped beef. Salt pork. Jerky. Pickled herring. Anchovies and sardines. Regular canned tuna. Salted nuts.  Dairy  Processed cheese and cheese spreads. Hard cheeses. Cheese curds. Blue cheese. Feta cheese. String cheese. Regular cottage cheese. Buttermilk. Canned milk.  Fats and oils  Salted butter. Regular margarine. Ghee. Ramos fat.  Seasonings and condiments  Onion salt, garlic salt, seasoned salt, table salt, and sea salt. Canned and packaged gravies. Worcestershire sauce. Tartar sauce. Barbecue sauce. Teriyaki sauce. Soy sauce, including reduced-sodium. Steak sauce. Fish sauce. Oyster sauce. Cocktail sauce. Horseradish that you find on the shelf. Regular ketchup and mustard. Meat flavorings and tenderizers. Bouillon cubes. Hot sauce. Pre-made or packaged marinades. Pre-made or packaged taco seasonings. Relishes. Regular salad dressings. Salsa.  Other foods  Salted popcorn and pretzels. Corn chips and puffs. Potato and tortilla chips. Canned or dried soups. Pizza. Frozen entrees and pot pies.  The items listed above may not be a complete list of foods and beverages you should avoid. Contact a dietitian for more information.  Summary  · Eating less sodium can help lower your blood pressure, reduce swelling, and protect your heart, liver, and kidneys.  · Most people on this plan should limit their sodium intake to 1,500-2,000 mg (milligrams) of sodium each day.  · Canned, boxed, and frozen foods are high in sodium. Restaurant foods, fast foods, and pizza are also very high in sodium. You also get sodium by adding salt to food.  · Try  to cook at home, eat more fresh fruits and vegetables, and eat less fast food and canned, processed, or prepared foods.  This information is not intended to replace advice given to you by your health care provider. Make sure you discuss any questions you have with your health care provider.  Document Revised: 01/22/2021 Document Reviewed: 11/18/2020  Elsevier Patient Education © 2021 [employer]

## 2021-09-29 ENCOUNTER — OFFICE VISIT (OUTPATIENT)
Dept: FAMILY MEDICINE CLINIC | Facility: CLINIC | Age: 56
End: 2021-09-29

## 2021-09-29 VITALS
SYSTOLIC BLOOD PRESSURE: 141 MMHG | HEART RATE: 78 BPM | HEIGHT: 66 IN | DIASTOLIC BLOOD PRESSURE: 96 MMHG | OXYGEN SATURATION: 96 % | BODY MASS INDEX: 36.71 KG/M2 | WEIGHT: 228.4 LBS

## 2021-09-29 DIAGNOSIS — Z23 NEED FOR INFLUENZA VACCINATION: Primary | ICD-10-CM

## 2021-09-29 DIAGNOSIS — I10 HYPERTENSION, UNSPECIFIED TYPE: ICD-10-CM

## 2021-09-29 DIAGNOSIS — F41.9 ANXIETY: ICD-10-CM

## 2021-09-29 PROBLEM — R07.89 CHEST TIGHTNESS: Status: RESOLVED | Noted: 2021-08-11 | Resolved: 2021-09-29

## 2021-09-29 PROCEDURE — 99214 OFFICE O/P EST MOD 30 MIN: CPT | Performed by: NURSE PRACTITIONER

## 2021-09-29 PROCEDURE — 90686 IIV4 VACC NO PRSV 0.5 ML IM: CPT | Performed by: NURSE PRACTITIONER

## 2021-09-29 PROCEDURE — 90471 IMMUNIZATION ADMIN: CPT | Performed by: NURSE PRACTITIONER

## 2021-09-29 RX ORDER — LISINOPRIL AND HYDROCHLOROTHIAZIDE 20; 12.5 MG/1; MG/1
2 TABLET ORAL DAILY
Qty: 60 TABLET | Refills: 1 | OUTPATIENT
Start: 2021-09-29 | End: 2021-10-18

## 2021-09-29 RX ORDER — ESCITALOPRAM OXALATE 5 MG/1
5 TABLET ORAL DAILY
Qty: 30 TABLET | Refills: 1 | Status: SHIPPED | OUTPATIENT
Start: 2021-09-29 | End: 2021-10-18

## 2021-09-29 NOTE — ASSESSMENT & PLAN NOTE
Hypertension is still very poorly controlled, we are going to add HCTZ today and increase the dose of lisinopril.  Discussed appropriate medication dosing with patient at length.  Follow-up in 1 month

## 2021-09-29 NOTE — PROGRESS NOTES
Chief Complaint  Hypertension    Subjective          Jewels Beltrán presents to [facility] for   History of Present Illness    Patient is here for a follow up on HTN. Has been monitoring her BP at home and it is still elevated.  Averaging 140s and 150s over 90s to 110    Patient has some concerns with worsening anxiety. States her daughter will not get the COVID shot and it is stressing her out very badly.  States she tends to have some anxiety and depression issues until after the holidays are over.  States she is interested in possible medication.  Medical History  Past Medical History:   Diagnosis Date   • Anxiety    • Arthritis    • Hypertension      Surgical History  Past Surgical History:   Procedure Laterality Date   • COLONOSCOPY       Social History  Social History     Socioeconomic History   • Marital status:      Spouse name: Not on file   • Number of children: Not on file   • Years of education: Not on file   • Highest education level: Not on file   Tobacco Use   • Smoking status: Current Every Day Smoker     Packs/day: 1.00     Types: Cigarettes     Start date: 1980   • Smokeless tobacco: Never Used   • Tobacco comment: SMOKED 21-30 YEARS   Vaping Use   • Vaping Use: Never used   Substance and Sexual Activity   • Alcohol use: Yes     Comment: DRINKS MONTHLY BEER AND LIQUOR   • Drug use: Never   • Sexual activity: Defer       Current Outpatient Medications:   •  aspirin 325 MG tablet, Take 325 mg by mouth daily., Disp: , Rfl:   •  nicotine (NICODERM CQ) 21 MG/24HR patch, Place 1 patch on the skin as directed by provider Daily., Disp: , Rfl:   •  escitalopram (Lexapro) 5 MG tablet, Take 1 tablet by mouth Daily., Disp: 30 tablet, Rfl: 1  •  lisinopril-hydrochlorothiazide (PRINZIDE,ZESTORETIC) 20-12.5 MG per tablet, Take 2 tablets by mouth Daily., Disp: 60 tablet, Rfl: 1    Review of Systems     Objective     /96 (BP Location: Left arm, Patient Position:  "Sitting, Cuff Size: Adult)   Pulse 78   Ht 167.6 cm (66\")   Wt 104 kg (228 lb 6.4 oz)   SpO2 96%   BMI 36.86 kg/m²     Body mass index is 36.86 kg/m².    Physical Exam  Vitals reviewed.   Constitutional:       Appearance: Normal appearance. She is well-developed.   HENT:      Head: Normocephalic and atraumatic.      Right Ear: External ear normal.      Left Ear: External ear normal.   Eyes:      Conjunctiva/sclera: Conjunctivae normal.      Pupils: Pupils are equal, round, and reactive to light.   Cardiovascular:      Rate and Rhythm: Normal rate and regular rhythm.      Heart sounds: No murmur heard.   No friction rub. No gallop.    Pulmonary:      Effort: Pulmonary effort is normal.      Breath sounds: Normal breath sounds. No wheezing or rhonchi.   Skin:     General: Skin is warm and dry.   Neurological:      Mental Status: She is alert and oriented to person, place, and time.      Cranial Nerves: No cranial nerve deficit.   Psychiatric:         Mood and Affect: Mood and affect normal.         Behavior: Behavior normal.         Thought Content: Thought content normal.         Judgment: Judgment normal.         Result Review :     The following data was reviewed by: AYAN Garzon on 09/29/2021:                         Assessment:  Diagnoses and all orders for this visit:    1. Need for influenza vaccination (Primary)  -     FluLaval/Fluarix >6 Months ([id number])    2. Hypertension, unspecified type  Assessment & Plan:  Hypertension is still very poorly controlled, we are going to add HCTZ today and increase the dose of lisinopril.  Discussed appropriate medication dosing with patient at length.  Follow-up in 1 month    Orders:  -     lisinopril-hydrochlorothiazide (PRINZIDE,ZESTORETIC) 20-12.5 MG per tablet; Take 2 tablets by mouth Daily.  Dispense: 60 tablet; Refill: 1    3. Anxiety  Assessment & Plan:  Patient's anxiety is very high at present, states she typically has seasonal anxiety and " depression this time of year, after some discussion about possible medical treatments, we have agreed to trial a low-dose of Lexapro.  Side effects administration of medication discussed.  Patient to follow-up in 1 month for further evaluation.    Orders:  -     escitalopram (Lexapro) 5 MG tablet; Take 1 tablet by mouth Daily.  Dispense: 30 tablet; Refill: 1      “Discussed risks/benefits to vaccination, reviewed components of the vaccine, discussed VIS, discussed informed consent, informed consent obtained. Patient/Parent was allowed to accept or refuse vaccine. Questions answered to satisfactory state of patient/Parent. We reviewed typical age appropriate and seasonally appropriate vaccinations. Reviewed immunization history and updated state vaccination form as needed. Patient was counseled on Influenza          Follow Up     Return in about 4 weeks (around 10/27/2021).    Patient was given instructions and counseling regarding her condition or for health maintenance advice. Please see specific information pulled into the AVS if appropriate.     Ashly Andrew, APRN  09/29/2021

## 2021-10-07 ENCOUNTER — TELEPHONE (OUTPATIENT)
Dept: FAMILY MEDICINE CLINIC | Facility: CLINIC | Age: 56
End: 2021-10-07

## 2021-10-07 NOTE — TELEPHONE ENCOUNTER
Caller: Jewels Beltrán    Relationship to patient: Self    Best call back number: [phone]    Patient is needing: PATIENT CALLED STATING SHE HAS BEEN FEELING DIZZY AND CLAMMY AFTER THE DOSAGE HAS BEEN INCREASED FROM HER BLOOD PRESSURE MEDICATION. SHE STATED SHE IS ON 40 MG AND SHE WOULD LIKE TO CHANGE IT TO 30 MG. PATIENT WOULD LIKE A CALL BACK TO LET HER KNOW THIS HAS BEEN SENT TO THE PHARMACY PLEASE ADVISE THANK YOU.       [id number] - [place], [place] - [address] AT [facility] - [phone]  - [phone]   [phone]

## 2021-10-07 NOTE — TELEPHONE ENCOUNTER
May send in 30 day supply, but pt needs to be monitoring her BP to see if it was getting too low or not, ensure she has f/u appt

## 2021-10-11 ENCOUNTER — TELEPHONE (OUTPATIENT)
Dept: FAMILY MEDICINE CLINIC | Facility: CLINIC | Age: 56
End: 2021-10-11

## 2021-10-11 DIAGNOSIS — I10 HYPERTENSION, UNSPECIFIED TYPE: Primary | ICD-10-CM

## 2021-10-11 RX ORDER — LISINOPRIL 10 MG/1
10 TABLET ORAL DAILY
Qty: 30 TABLET | Refills: 1 | Status: SHIPPED | OUTPATIENT
Start: 2021-10-11 | End: 2021-10-12 | Stop reason: SDUPTHER

## 2021-10-13 ENCOUNTER — TELEPHONE (OUTPATIENT)
Dept: CARDIOLOGY | Facility: CLINIC | Age: 56
End: 2021-10-13

## 2021-10-13 NOTE — TELEPHONE ENCOUNTER
S/W patient about treadmill stress test results. Informed patient office will call back when echo results. Patient voiced understanding.

## 2021-10-13 NOTE — TELEPHONE ENCOUNTER
----- Message from Tatyana Mcconnell RN sent at 10/13/2021 12:05 PM EDT -----    ----- Message -----  From: PELON Zayas MD  Sent: 10/13/2021  11:43 AM EDT  To: Tatyana Mcconnell RN    Normal treadmill stress EKG other than exercise tolerance was mildly impaired, and she had a hypertensive response to exercise.    Echo pending

## 2021-10-13 NOTE — TELEPHONE ENCOUNTER
Received following from Dr Zayas:       Echo reviewed   Heart function was normal   Valve function was normal     Cardiac work-up is low risk, as we discussed in the office I think the primary issue is hypertension.  This is being managed by primary care.  No additional cardiac work-up is needed at this time.  She can be followed as needed.     Called patient with results and recommendations.

## 2021-10-18 ENCOUNTER — OFFICE VISIT (OUTPATIENT)
Dept: FAMILY MEDICINE CLINIC | Facility: CLINIC | Age: 56
End: 2021-10-18

## 2021-10-18 VITALS
OXYGEN SATURATION: 97 % | DIASTOLIC BLOOD PRESSURE: 89 MMHG | HEART RATE: 66 BPM | BODY MASS INDEX: 36.38 KG/M2 | WEIGHT: 226.4 LBS | HEIGHT: 66 IN | SYSTOLIC BLOOD PRESSURE: 130 MMHG

## 2021-10-18 DIAGNOSIS — I10 HYPERTENSION, UNSPECIFIED TYPE: Primary | ICD-10-CM

## 2021-10-18 DIAGNOSIS — F17.200 SMOKER: ICD-10-CM

## 2021-10-18 PROCEDURE — 99213 OFFICE O/P EST LOW 20 MIN: CPT | Performed by: NURSE PRACTITIONER

## 2021-10-18 RX ORDER — LISINOPRIL 10 MG/1
10 TABLET ORAL DAILY
COMMUNITY
End: 2021-11-16

## 2021-10-18 RX ORDER — LISINOPRIL AND HYDROCHLOROTHIAZIDE 20; 12.5 MG/1; MG/1
1 TABLET ORAL DAILY
COMMUNITY
End: 2021-11-16

## 2021-10-18 NOTE — PROGRESS NOTES
Chief Complaint  Hypertension    Subjective          Jewels Beltrán presents to [facility] for   History of Present Illness    Patient is here for a follow up on her BP medications. We had recently increased the dosage of Lisinopril-HCTZ 20-12.5 mg to 2x a day, but patient states it was making her clammy and thinks it made her BP drop. Patient was seen at  on 10/12/21 for this issue. We decreased her dosage of Lisinopril HCTZ to 20-12.5 mg once a day, but added on Lisinopril 10 mg along with it.  Patient states she has not been taking the extra 10 mg for the last 5 to 6 days.  Taking the 20/12.5 mg 1 tab a day only.  Blood pressure not at goal    Patient admits she is still smoking.  Medical History  Past Medical History:   Diagnosis Date   • Anxiety    • Arthritis    • Hypertension      Surgical History  Past Surgical History:   Procedure Laterality Date   • COLONOSCOPY       Social History  Social History     Socioeconomic History   • Marital status:    Tobacco Use   • Smoking status: Current Every Day Smoker     Packs/day: 1.00     Types: Cigarettes     Start date: 1980   • Smokeless tobacco: Never Used   • Tobacco comment: SMOKED 21-30 YEARS   Vaping Use   • Vaping Use: Never used   Substance and Sexual Activity   • Alcohol use: Yes     Comment: DRINKS MONTHLY BEER AND LIQUOR   • Drug use: Never   • Sexual activity: Defer       Current Outpatient Medications:   •  aspirin 325 MG tablet, Take 325 mg by mouth daily., Disp: , Rfl:   •  lisinopril (PRINIVIL,ZESTRIL) 10 MG tablet, Take 10 mg by mouth Daily., Disp: , Rfl:   •  lisinopril-hydrochlorothiazide (PRINZIDE,ZESTORETIC) 20-12.5 MG per tablet, Take 1 tablet by mouth Daily., Disp: , Rfl:   •  nicotine (NICODERM CQ) 21 MG/24HR patch, Place 1 patch on the skin as directed by provider Daily., Disp: , Rfl:     Review of Systems     Objective     /89 (BP Location: Left arm, Patient Position: Sitting, Cuff Size:  "Adult)   Pulse 66   Ht 167.6 cm (66\")   Wt 103 kg (226 lb 6.4 oz)   SpO2 97%   BMI 36.54 kg/m²     Body mass index is 36.54 kg/m².    Physical Exam  Vitals reviewed.   Constitutional:       Appearance: Normal appearance. She is well-developed.   HENT:      Head: Normocephalic and atraumatic.      Right Ear: External ear normal.      Left Ear: External ear normal.   Eyes:      Conjunctiva/sclera: Conjunctivae normal.      Pupils: Pupils are equal, round, and reactive to light.   Cardiovascular:      Rate and Rhythm: Normal rate and regular rhythm.      Heart sounds: No murmur heard.  No friction rub. No gallop.    Pulmonary:      Effort: Pulmonary effort is normal.      Breath sounds: Normal breath sounds. No wheezing or rhonchi.   Skin:     General: Skin is warm and dry.   Neurological:      Mental Status: She is alert and oriented to person, place, and time.      Cranial Nerves: No cranial nerve deficit.   Psychiatric:         Mood and Affect: Mood and affect normal.         Behavior: Behavior normal.         Thought Content: Thought content normal.         Judgment: Judgment normal.         Result Review :     The following data was reviewed by: AYAN Garzon on 10/18/2021:                         Assessment:  Diagnoses and all orders for this visit:    1. Hypertension, unspecified type (Primary)  Assessment & Plan:  Hypertension is not well controlled at present, patient is to resume taking the additional 10 mg of lisinopril and continue to monitor.  She will be taking a total of 30 mg lisinopril and 12.5 mg of HCTZ.  Follow-up in 1 month for reevaluation      2. Smoker  -      CT Chest Low Dose Cancer Screening WO; Future              Follow Up     Return in about 4 weeks (around 11/15/2021).    Patient was given instructions and counseling regarding her condition or for health maintenance advice. Please see specific information pulled into the AVS if appropriate.     Ashly Andrew, " APRN  10/18/2021

## 2021-10-18 NOTE — ASSESSMENT & PLAN NOTE
Hypertension is not well controlled at present, patient is to resume taking the additional 10 mg of lisinopril and continue to monitor.  She will be taking a total of 30 mg lisinopril and 12.5 mg of HCTZ.  Follow-up in 1 month for reevaluation

## 2021-10-27 ENCOUNTER — TELEPHONE (OUTPATIENT)
Dept: FAMILY MEDICINE CLINIC | Facility: CLINIC | Age: 56
End: 2021-10-27

## 2021-10-27 ENCOUNTER — HOSPITAL ENCOUNTER (OUTPATIENT)
Dept: CT IMAGING | Facility: HOSPITAL | Age: 56
Discharge: HOME | End: 2021-10-27
Admitting: NURSE PRACTITIONER

## 2021-10-27 DIAGNOSIS — F17.200 SMOKER: ICD-10-CM

## 2021-10-27 PROCEDURE — 71271 CT THORAX LUNG CANCER SCR C-: CPT

## 2021-11-04 ENCOUNTER — HOSPITAL ENCOUNTER (EMERGENCY)
Facility: HOSPITAL | Age: 56
Discharge: HOME | End: 2021-11-04
Attending: EMERGENCY MEDICINE | Admitting: EMERGENCY MEDICINE

## 2021-11-04 VITALS
HEART RATE: 72 BPM | RESPIRATION RATE: 16 BRPM | DIASTOLIC BLOOD PRESSURE: 83 MMHG | SYSTOLIC BLOOD PRESSURE: 125 MMHG | HEIGHT: 66 IN | WEIGHT: 225.31 LBS | TEMPERATURE: 98 F | BODY MASS INDEX: 36.21 KG/M2 | OXYGEN SATURATION: 99 %

## 2021-11-04 DIAGNOSIS — R20.2 PARESTHESIA: Primary | ICD-10-CM

## 2021-11-04 LAB
ALBUMIN SERPL-MCNC: 4.5 G/DL (ref 3.5–5.2)
ALBUMIN/GLOB SERPL: 1.5 G/DL
ALP SERPL-CCNC: 91 U/L (ref 39–117)
ALT SERPL W P-5'-P-CCNC: 24 U/L (ref 1–33)
ANION GAP SERPL CALCULATED.3IONS-SCNC: 10.7 MMOL/L (ref 5–15)
AST SERPL-CCNC: 21 U/L (ref 1–32)
BASOPHILS # BLD AUTO: 0.05 10*3/MM3 (ref 0–0.2)
BASOPHILS NFR BLD AUTO: 0.5 % (ref 0–1.5)
BILIRUB SERPL-MCNC: 0.4 MG/DL (ref 0–1.2)
BUN SERPL-MCNC: 15 MG/DL (ref 6–20)
BUN/CREAT SERPL: 19.5 (ref 7–25)
CALCIUM SPEC-SCNC: 10.1 MG/DL (ref 8.6–10.5)
CHLORIDE SERPL-SCNC: 102 MMOL/L (ref 98–107)
CK SERPL-CCNC: 44 U/L (ref 20–180)
CO2 SERPL-SCNC: 25.3 MMOL/L (ref 22–29)
CREAT SERPL-MCNC: 0.77 MG/DL (ref 0.57–1)
DEPRECATED RDW RBC AUTO: 44.3 FL (ref 37–54)
EOSINOPHIL # BLD AUTO: 0.13 10*3/MM3 (ref 0–0.4)
EOSINOPHIL NFR BLD AUTO: 1.3 % (ref 0.3–6.2)
ERYTHROCYTE [DISTWIDTH] IN BLOOD BY AUTOMATED COUNT: 13.6 % (ref 12.3–15.4)
GFR SERPL CREATININE-BSD FRML MDRD: 77 ML/MIN/1.73
GLOBULIN UR ELPH-MCNC: 3 GM/DL
GLUCOSE SERPL-MCNC: 87 MG/DL (ref 65–99)
HCT VFR BLD AUTO: 42.5 % (ref 34–46.6)
HGB BLD-MCNC: 14.4 G/DL (ref 12–15.9)
HOLD SPECIMEN: NORMAL
HOLD SPECIMEN: NORMAL
IMM GRANULOCYTES # BLD AUTO: 0.01 10*3/MM3 (ref 0–0.05)
IMM GRANULOCYTES NFR BLD AUTO: 0.1 % (ref 0–0.5)
LYMPHOCYTES # BLD AUTO: 2.44 10*3/MM3 (ref 0.7–3.1)
LYMPHOCYTES NFR BLD AUTO: 25.3 % (ref 19.6–45.3)
MAGNESIUM SERPL-MCNC: 2.1 MG/DL (ref 1.6–2.6)
MCH RBC QN AUTO: 29.9 PG (ref 26.6–33)
MCHC RBC AUTO-ENTMCNC: 33.9 G/DL (ref 31.5–35.7)
MCV RBC AUTO: 88.4 FL (ref 79–97)
MONOCYTES # BLD AUTO: 0.96 10*3/MM3 (ref 0.1–0.9)
MONOCYTES NFR BLD AUTO: 9.9 % (ref 5–12)
NEUTROPHILS NFR BLD AUTO: 6.06 10*3/MM3 (ref 1.7–7)
NEUTROPHILS NFR BLD AUTO: 62.9 % (ref 42.7–76)
NRBC BLD AUTO-RTO: 0 /100 WBC (ref 0–0.2)
PLATELET # BLD AUTO: 281 10*3/MM3 (ref 140–450)
PMV BLD AUTO: 12 FL (ref 6–12)
POTASSIUM SERPL-SCNC: 3.6 MMOL/L (ref 3.5–5.2)
PROT SERPL-MCNC: 7.5 G/DL (ref 6–8.5)
RBC # BLD AUTO: 4.81 10*6/MM3 (ref 3.77–5.28)
SODIUM SERPL-SCNC: 138 MMOL/L (ref 136–145)
TROPONIN T SERPL-MCNC: <0.01 NG/ML (ref 0–0.03)
WBC # BLD AUTO: 9.65 10*3/MM3 (ref 3.4–10.8)
WHOLE BLOOD HOLD SPECIMEN: NORMAL
WHOLE BLOOD HOLD SPECIMEN: NORMAL

## 2021-11-04 PROCEDURE — 80053 COMPREHEN METABOLIC PANEL: CPT | Performed by: EMERGENCY MEDICINE

## 2021-11-04 PROCEDURE — 83735 ASSAY OF MAGNESIUM: CPT | Performed by: EMERGENCY MEDICINE

## 2021-11-04 PROCEDURE — 36415 COLL VENOUS BLD VENIPUNCTURE: CPT

## 2021-11-04 PROCEDURE — 99282 EMERGENCY DEPT VISIT SF MDM: CPT

## 2021-11-04 PROCEDURE — 85025 COMPLETE CBC W/AUTO DIFF WBC: CPT | Performed by: EMERGENCY MEDICINE

## 2021-11-04 PROCEDURE — 82550 ASSAY OF CK (CPK): CPT | Performed by: EMERGENCY MEDICINE

## 2021-11-04 PROCEDURE — 84484 ASSAY OF TROPONIN QUANT: CPT | Performed by: EMERGENCY MEDICINE

## 2021-11-05 NOTE — ED TRIAGE NOTES
Pt to Ed 25 with c/o feeling unwell since May 2021 w/ multiple visits to urgent care, ed and dx w/ htn and since placed on medication.  Pt reports since then multiple complaints of dizziness, ha, abd bloating/spasms, pain between shoulders, L arm tingling/twitching.  Pt reports woke today w/ all these sx and have been persistent throughout today.  Pt reports attempting to call pcp to schedule appt and they were concerned about the arm tingling so wanted pt to come to ED for further eval.  Pt presents a/ox4, gcs 15, in nad while lying still on bed.

## 2021-11-05 NOTE — ED PROVIDER NOTES
"Time: 9:37 PM EDT  Arrived by: private car  Chief Complaint: LEFT ARM PAIN   History provided by: pt  History is limited by: N/A     History of Present Illness:  Patient is a 55 y.o. female that presents to the emergency department with LEFT ARM PAIN that she describes as tingling pain and vein twitching. It started several days ago and is still present and is unchanged. Pt describes symptoms as moderate in severity. Nothing improves or worsens symptoms.     Pt denies CP and SOB. Pt states that she has been feeling unwell since May 2021 with episodes of headaches, bilateral shoulder pain, CP and abdominal pain - none of which are present currently. She has followed up with her PCP for issues and states that she and her provider are \"trying to get to the bottom of it\". She has had a stress test and echocardiogram since May 2021. She also notes being diagnosed with HTN since May 2021.     Pt is vaccinated for COVID-19.     History provided by:  Patient  Arm Pain  Location:  Left arm  Quality:  Tingling pain and vein twitching  Severity:  Moderate  Onset quality:  Unable to specify  Duration: started several days ago.  Timing:  Constant  Progression:  Unchanged  Chronicity:  New  Relieved by:  Nothing   Worsened by:  Nothing   Ineffective treatments:  None tried   Associated symptoms: no abdominal pain, no chest pain, no congestion, no cough, no diarrhea, no fever, no headaches, no nausea, no rhinorrhea, no shortness of breath, no sore throat and no vomiting        Similar Symptoms Previously: no   Recently seen: recently seen by PCP       Patient Care Team  Primary Care Provider: Ashly Andrew APRN    Past Medical History:     No Known Allergies  Past Medical History:   Diagnosis Date   • Anxiety    • Arthritis    • Hypertension      Past Surgical History:   Procedure Laterality Date   • COLONOSCOPY       Family History   Problem Relation Age of Onset   • Diabetes Mother    • Cancer Mother    • Arthritis Mother " "   • Heart disease Father        Home Medications:  Prior to Admission medications    Medication Sig Start Date End Date Taking? Authorizing Provider   aspirin 325 MG tablet Take 325 mg by mouth daily.    Kalyan Figueroa MD   lisinopril (PRINIVIL,ZESTRIL) 10 MG tablet Take 10 mg by mouth Daily.    Kalyan Figueroa MD   lisinopril-hydrochlorothiazide (PRINZIDE,ZESTORETIC) 20-12.5 MG per tablet Take 1 tablet by mouth Daily.    Kalyan Figueroa MD   nicotine (NICODERM CQ) 21 MG/24HR patch Place 1 patch on the skin as directed by provider Daily.    Kalyan Figueroa MD        Social History:   Social History     Tobacco Use   • Smoking status: Current Every Day Smoker     Packs/day: 1.00     Types: Cigarettes     Start date: 1980   • Smokeless tobacco: Never Used   • Tobacco comment: SMOKED 21-30 YEARS   Vaping Use   • Vaping Use: Never used   Substance Use Topics   • Alcohol use: Yes     Comment: DRINKS MONTHLY BEER AND LIQUOR   • Drug use: Never     Recent travel: no     Review of Systems:  Review of Systems   Constitutional: Negative for chills and fever.   HENT: Negative for congestion, rhinorrhea and sore throat.    Eyes: Negative for pain and visual disturbance.   Respiratory: Negative for apnea, cough, chest tightness and shortness of breath.    Cardiovascular: Negative for chest pain and palpitations.   Gastrointestinal: Negative for abdominal pain, diarrhea, nausea and vomiting.   Genitourinary: Negative for difficulty urinating and dysuria.   Musculoskeletal: Negative for joint swelling.        Left arm pain.    Skin: Negative for color change.   Neurological: Negative for seizures and headaches.   Psychiatric/Behavioral: Negative.    All other systems reviewed and are negative.       Physical Exam:  /83 (BP Location: Left arm, Patient Position: Lying)   Pulse 72   Temp 98 °F (36.7 °C) (Oral)   Resp 16   Ht 167.6 cm (66\")   Wt 102 kg (225 lb 5 oz)   LMP 10/08/2016 (Approximate) " Comment: 2017  SpO2 99%   Breastfeeding No   BMI 36.37 kg/m²     Physical Exam  Vitals and nursing note reviewed.   Constitutional:       General: She is not in acute distress.     Appearance: Normal appearance. She is not toxic-appearing.   HENT:      Head: Normocephalic and atraumatic.      Jaw: There is normal jaw occlusion.      Nose: Nose normal.   Eyes:      General: Lids are normal. No scleral icterus.     Extraocular Movements: Extraocular movements intact.      Conjunctiva/sclera: Conjunctivae normal.      Pupils: Pupils are equal, round, and reactive to light.   Cardiovascular:      Rate and Rhythm: Normal rate and regular rhythm.      Pulses: Normal pulses.      Heart sounds: Normal heart sounds.   Pulmonary:      Effort: Pulmonary effort is normal. No respiratory distress.      Breath sounds: Normal breath sounds. No wheezing or rhonchi.   Abdominal:      General: Abdomen is flat.      Palpations: Abdomen is soft.      Tenderness: There is no abdominal tenderness. There is no guarding or rebound.   Musculoskeletal:         General: Normal range of motion.      Cervical back: Normal range of motion and neck supple.      Right lower leg: No edema.      Left lower leg: No edema.   Skin:     General: Skin is warm and dry.   Neurological:      General: No focal deficit present.      Mental Status: She is alert and oriented to person, place, and time. Mental status is at baseline.      Sensory: No sensory deficit.      Motor: No weakness.   Psychiatric:         Mood and Affect: Mood normal.                Medications in the Emergency Department:  Medications - No data to display     Labs  Lab Results (last 24 hours)     Procedure Component Value Units Date/Time    CBC & Differential [[id number]]  (Abnormal) Collected: 11/04/21 1949    Specimen: Blood from Arm, Right Updated: 11/04/21 2151    Narrative:      The following orders were created for panel order CBC & Differential.  Procedure                                Abnormality         Status                     ---------                               -----------         ------                     CBC Auto Differential[[id number]]        Abnormal            Final result                 Please view results for these tests on the individual orders.    CK [[id number]]  (Normal) Collected: 11/04/21 1949    Specimen: Blood from Arm, Right Updated: 11/04/21 2203     Creatine Kinase 44 U/L     Comprehensive Metabolic Panel [[id number]] Collected: 11/04/21 1949    Specimen: Blood from Arm, Right Updated: 11/04/21 2203     Glucose 87 mg/dL      BUN 15 mg/dL      Creatinine 0.77 mg/dL      Sodium 138 mmol/L      Potassium 3.6 mmol/L      Chloride 102 mmol/L      CO2 25.3 mmol/L      Calcium 10.1 mg/dL      Total Protein 7.5 g/dL      Albumin 4.50 g/dL      ALT (SGPT) 24 U/L      AST (SGOT) 21 U/L      Alkaline Phosphatase 91 U/L      Total Bilirubin 0.4 mg/dL      eGFR Non African Amer 77 mL/min/1.73      Globulin 3.0 gm/dL      A/G Ratio 1.5 g/dL      BUN/Creatinine Ratio 19.5     Anion Gap 10.7 mmol/L     Narrative:      GFR Normal >60  Chronic Kidney Disease <60  Kidney Failure <15      Magnesium [[id number]]  (Normal) Collected: 11/04/21 1949    Specimen: Blood from Arm, Right Updated: 11/04/21 2203     Magnesium 2.1 mg/dL     Troponin [[id number]]  (Normal) Collected: 11/04/21 1949    Specimen: Blood from Arm, Right Updated: 11/04/21 2203     Troponin T <0.010 ng/mL     Narrative:      Troponin T Reference Range:  <= 0.03 ng/mL-   Negative for AMI  >0.03 ng/mL-     Abnormal for myocardial necrosis.  Clinicians would have to utilize clinical acumen, EKG, Troponin and serial changes to determine if it is an Acute Myocardial Infarction or myocardial injury due to an underlying chronic condition.       Results may be falsely decreased if patient taking Biotin.      CBC Auto Differential [[id number]]  (Abnormal) Collected: 11/04/21 1949    Specimen: Blood from Arm, Right Updated:  11/04/21 2151     WBC 9.65 10*3/mm3      RBC 4.81 10*6/mm3      Hemoglobin 14.4 g/dL      Hematocrit 42.5 %      MCV 88.4 fL      MCH 29.9 pg      MCHC 33.9 g/dL      RDW 13.6 %      RDW-SD 44.3 fl      MPV 12.0 fL      Platelets 281 10*3/mm3      Neutrophil % 62.9 %      Lymphocyte % 25.3 %      Monocyte % 9.9 %      Eosinophil % 1.3 %      Basophil % 0.5 %      Immature Grans % 0.1 %      Neutrophils, Absolute 6.06 10*3/mm3      Lymphocytes, Absolute 2.44 10*3/mm3      Monocytes, Absolute 0.96 10*3/mm3      Eosinophils, Absolute 0.13 10*3/mm3      Basophils, Absolute 0.05 10*3/mm3      Immature Grans, Absolute 0.01 10*3/mm3      nRBC 0.0 /100 WBC            Imaging:  No Radiology Exams Resulted Within Past 24 Hours    Procedures:  Procedures    Progress                            Medical Decision Making:  MDM  Number of Diagnoses or Management Options  Paresthesia  Diagnosis management comments: The patient is resting comfortably and feels better, is alert, talkative and in no distress.  The patient´s CBC was reviewed and shows no abnormalities of critical concern. Of note, there is no anemia requiring a blood transfusion and the platelet count is acceptable.  The patient´s CMP was reviewed and shows no abnormalities of critical concern. Of note, the patient´s sodium and potassium are acceptable. The patient´s liver enzymes are unremarkable. The patient´s renal function (creatinine) is preserved. The patient has a normal anion gap.  Troponin is negative.  Magnesium is 2.1.  Patient symptoms are consistent with paresthesias.  The repeat examination is unremarkable and benign. The patient is neurologically intact, has a normal mental status and this ambulatory in the ED. The history, exam, diagnostic testing in the patient's current condition do not suggest meningitis, stroke, sepsis, subarachnoid hemorrhage, intracranial bleeding, encephalitis or other significant pathology that would warrant further testing,  continued ED treatment, admission, neurological consultation, or other specialist evaluation at this point. The vital signs have been stable. The patient's condition is stable and appropriate for discharge. The patient will pursue further outpatient evaluation with the primary care physician or other designated or consulting position as indicated in the discharge instructions.       Amount and/or Complexity of Data Reviewed  Clinical lab tests: reviewed    Risk of Complications, Morbidity, and/or Mortality  Presenting problems: moderate  Management options: moderate    Patient Progress  Patient progress: stable       Final diagnoses:   Paresthesia        Disposition:  ED Disposition     ED Disposition Condition Comment    Discharge Stable           This medical record created using voice recognition software and may contain unintended errors.    Documentation assistance provided by Millicent Bentley acting as scribe for Baldomero Monique MD. Information recorded by the scribe was done at my direction and has been verified and validated by me.        Millicent Bentley  11/04/21 2143       Baldomero Monique MD  11/04/21 7099

## 2021-11-16 ENCOUNTER — OFFICE VISIT (OUTPATIENT)
Dept: FAMILY MEDICINE CLINIC | Facility: CLINIC | Age: 56
End: 2021-11-16

## 2021-11-16 VITALS
BODY MASS INDEX: 35.93 KG/M2 | WEIGHT: 223.6 LBS | HEART RATE: 82 BPM | SYSTOLIC BLOOD PRESSURE: 126 MMHG | HEIGHT: 66 IN | DIASTOLIC BLOOD PRESSURE: 71 MMHG | OXYGEN SATURATION: 96 %

## 2021-11-16 DIAGNOSIS — R10.9 ABDOMINAL DISCOMFORT: ICD-10-CM

## 2021-11-16 DIAGNOSIS — F41.9 ANXIETY: ICD-10-CM

## 2021-11-16 DIAGNOSIS — R20.2 PARESTHESIAS: ICD-10-CM

## 2021-11-16 DIAGNOSIS — I10 PRIMARY HYPERTENSION: Primary | ICD-10-CM

## 2021-11-16 PROBLEM — R10.31 ABDOMINAL PAIN, RIGHT LOWER QUADRANT: Status: ACTIVE | Noted: 2021-11-16

## 2021-11-16 PROBLEM — E07.9 THYROID DISORDER: Status: ACTIVE | Noted: 2021-11-16

## 2021-11-16 PROBLEM — M19.90 ARTHRITIS: Status: ACTIVE | Noted: 2021-11-16

## 2021-11-16 PROBLEM — N73.9 ABSCESS OF FEMALE PELVIS: Status: ACTIVE | Noted: 2021-11-16

## 2021-11-16 PROCEDURE — 99214 OFFICE O/P EST MOD 30 MIN: CPT | Performed by: NURSE PRACTITIONER

## 2021-11-16 RX ORDER — HYDROCHLOROTHIAZIDE 12.5 MG/1
12.5 TABLET ORAL DAILY
Qty: 30 TABLET | Refills: 1 | Status: SHIPPED | OUTPATIENT
Start: 2021-11-16 | End: 2021-12-16

## 2021-11-16 RX ORDER — ESCITALOPRAM OXALATE 5 MG/1
5 TABLET ORAL DAILY
COMMUNITY
Start: 2021-10-28 | End: 2021-11-29

## 2021-11-16 RX ORDER — LISINOPRIL 40 MG/1
40 TABLET ORAL DAILY
Qty: 30 TABLET | Refills: 1 | Status: SHIPPED | OUTPATIENT
Start: 2021-11-16 | End: 2021-12-16

## 2021-11-16 NOTE — ASSESSMENT & PLAN NOTE
Discussed with patient that her symptoms could be triggered by a number of things including issues with her neck or shoulders however patient denies any problems with this or history of problems with this.  For now we will continue to monitor and see if her symptoms recur, discussed that and a nerve conduction study will likely be the next step in evaluating this.

## 2021-11-16 NOTE — ASSESSMENT & PLAN NOTE
Discussed with patient that her underlying untreated anxiety may be contributing to some of the symptoms.  She has decided to go ahead and try starting the Lexapro today.  We will follow-up in 1 month for reevaluation.

## 2021-11-16 NOTE — ASSESSMENT & PLAN NOTE
Patient does have history of ovarian cyst and has an appointment with GYN for further evaluation.  If no improvement or any worsening symptoms patient is to follow-up.

## 2021-11-16 NOTE — PROGRESS NOTES
"Chief Complaint  Follow-up hypertension, not feeling well, tingling sensation in arms.  Subjective          Jewels Beltrán presents to [facility]  History of Present Illness  Patient here today to follow-up on hypertension.  Patient has been monitoring her blood pressure at home.  Has list of recordings today, blood pressure does seem to fluctuate but is more often poorly controlled than not.  Systolic 120s to 140s typically, diastolic 80s to 90s.  States as far as feeling bad she has good days and bad days.  States that today she seems to feel fine, states that however yesterday she was not feeling well.  States it is hard to describe the feeling but that she just feels unwell.    States that she also has some issues with off and on abdominal pressure.  States it feels like\" a baby inside pushing out\".  States is very hard to pinpoint, states seems to be generalized but may be slightly more on the right side.  Patient states she has an appointment with her GYN for pelvic exam.  States that she did have a CT of the abdomen sometime in the summer when this first started which was normal other than an ovarian cyst.  Patient denies any nausea vomiting or diarrhea.    Patient also complaining of tingling sensation in her arms.  States that it is hard to describe, but it makes her feel the need to stretch, states sometimes puts a heat on her arm because it makes it feel better.  States she feels like it is in her veins and it is a twitching sensation.  Denies any issues with this at present.  Patient has been seen by cardiology recently and had a stress test and echo which were normal.  Patient very concerned about all of her symptoms and wants to know if they can all be caused by her blood high blood pressure.  Patient admits she does have anxiety, was previously prescribed Lexapro but states she never actually started taking the medication.  Objective   Vital Signs:   /71   " "Pulse 82   Ht 167.6 cm (66\")   Wt 101 kg (223 lb 9.6 oz)   SpO2 96%   BMI 36.09 kg/m²     Physical Exam  Vitals reviewed.   Constitutional:       Appearance: Normal appearance. She is well-developed.   HENT:      Head: Normocephalic and atraumatic.      Right Ear: External ear normal.      Left Ear: External ear normal.   Eyes:      Conjunctiva/sclera: Conjunctivae normal.      Pupils: Pupils are equal, round, and reactive to light.   Cardiovascular:      Rate and Rhythm: Normal rate and regular rhythm.      Heart sounds: No murmur heard.  No friction rub. No gallop.    Pulmonary:      Effort: Pulmonary effort is normal.      Breath sounds: Normal breath sounds. No wheezing or rhonchi.   Skin:     General: Skin is warm and dry.   Neurological:      Mental Status: She is alert and oriented to person, place, and time.      Cranial Nerves: No cranial nerve deficit.   Psychiatric:         Mood and Affect: Mood and affect normal.         Behavior: Behavior normal.         Thought Content: Thought content normal.         Judgment: Judgment normal.        Result Review :     CMP    CMP 6/18/21 7/7/21 11/4/21   Glucose 95 92 87   BUN 13 17 15   Creatinine 0.77 0.90 0.77   eGFR Non African Am 77 65 77   Sodium 138 138 138   Potassium 3.8 4.3 3.6   Chloride 105 109 (A) 102   Calcium 10.1 10.1 10.1   Albumin 4.60  4.50   Total Bilirubin 0.7  0.4   Alkaline Phosphatase 82  91   AST (SGOT) 21  21   ALT (SGPT) 29  24   (A) Abnormal value            CBC    CBC 6/18/21 7/7/21 11/4/21   WBC 9.92 11.99 (A) 9.65   RBC 4.81 4.90 4.81   Hemoglobin 14.1 14.3 14.4   Hematocrit 42.3 42.7 42.5   MCV 87.9 87.1 88.4   MCH 29.3 29.2 29.9   MCHC 33.3 33.5 33.9   RDW 13.4 13.2 13.6   Platelets 295 322 281   (A) Abnormal value            TSH    TSH 5/20/21   TSH 1.490           Data reviewed: Recent hospitalization notes Most recent ER visit          Assessment and Plan    Diagnoses and all orders for this visit:    1. Primary hypertension " (Primary)  Assessment & Plan:  Hypertension is still not at goal per patient report and the extensive list of blood pressure readings that she brought from home.  We will go ahead and increase her dose back to 40 mg of lisinopril today.  She will be taking 12.5 mg total of hydrochlorothiazide.  We will change her tablets over to separate the doses and prescribe one 40 mg lisinopril tablet and one 12.5 mg HCTZ tablet.  Patient is to continue to monitor blood pressure and follow-up in 1 month.    Orders:  -     lisinopril (PRINIVIL,ZESTRIL) 40 MG tablet; Take 1 tablet by mouth Daily.  Dispense: 30 tablet; Refill: 1  -     hydroCHLOROthiazide (HYDRODIURIL) 12.5 MG tablet; Take 1 tablet by mouth Daily.  Dispense: 30 tablet; Refill: 1    2. Paresthesias  Assessment & Plan:  Discussed with patient that her symptoms could be triggered by a number of things including issues with her neck or shoulders however patient denies any problems with this or history of problems with this.  For now we will continue to monitor and see if her symptoms recur, discussed that and a nerve conduction study will likely be the next step in evaluating this.      3. Abdominal discomfort  Assessment & Plan:  Patient does have history of ovarian cyst and has an appointment with GYN for further evaluation.  If no improvement or any worsening symptoms patient is to follow-up.      4. Anxiety  Assessment & Plan:  Discussed with patient that her underlying untreated anxiety may be contributing to some of the symptoms.  She has decided to go ahead and try starting the Lexapro today.  We will follow-up in 1 month for reevaluation.        Follow Up   Return in about 4 weeks (around 12/14/2021).  Patient was given instructions and counseling regarding her condition or for health maintenance advice. Please see specific information pulled into the AVS if appropriate.

## 2021-11-16 NOTE — ASSESSMENT & PLAN NOTE
Hypertension is still not at goal per patient report and the extensive list of blood pressure readings that she brought from home.  We will go ahead and increase her dose back to 40 mg of lisinopril today.  She will be taking 12.5 mg total of hydrochlorothiazide.  We will change her tablets over to separate the doses and prescribe one 40 mg lisinopril tablet and one 12.5 mg HCTZ tablet.  Patient is to continue to monitor blood pressure and follow-up in 1 month.

## 2021-11-28 DIAGNOSIS — F41.9 ANXIETY: ICD-10-CM

## 2021-11-29 RX ORDER — ESCITALOPRAM OXALATE 5 MG/1
TABLET ORAL
Qty: 30 TABLET | Refills: 1 | Status: SHIPPED | OUTPATIENT
Start: 2021-11-29 | End: 2021-12-29

## 2021-12-01 ENCOUNTER — OFFICE VISIT (OUTPATIENT)
Dept: OBSTETRICS AND GYNECOLOGY | Facility: CLINIC | Age: 56
End: 2021-12-01

## 2021-12-01 VITALS
WEIGHT: 223 LBS | HEIGHT: 66 IN | DIASTOLIC BLOOD PRESSURE: 80 MMHG | BODY MASS INDEX: 35.84 KG/M2 | SYSTOLIC BLOOD PRESSURE: 150 MMHG

## 2021-12-01 DIAGNOSIS — Z01.419 ENCOUNTER FOR GYNECOLOGICAL EXAMINATION WITHOUT ABNORMAL FINDING: Primary | ICD-10-CM

## 2021-12-01 DIAGNOSIS — N83.201 CYST OF RIGHT OVARY: ICD-10-CM

## 2021-12-01 PROCEDURE — 99396 PREV VISIT EST AGE 40-64: CPT | Performed by: OBSTETRICS & GYNECOLOGY

## 2021-12-01 NOTE — PROGRESS NOTES
GYN Annual Exam     CC- Here for annual exam.     Jewels Beltrán is a 56 y.o. female who presents for annual well woman exam. She IS/ IS NOT: is menopausal.  She is experiencing and/or reports no significantly bothersome menopausal symptoms..  She denies postmenopausal vaginal bleeding.  She reports abdominal pain and this has been going on for 6 to 8 months and mostly in her right lower quadrant..  Today, she wishes to specifically address the recent development of of right lower quadrant pain.. She describes the pain as a spasm type pain that might last for several hours and then spontaneously resolved.  She states it may improve after a bowel movement.  She denies any known exacerbating factors and denies dyspareunia or pain with any specific movements.  She denies any relationship to bladder function to the pain.  She pointed out and I reviewed a CT scan done on 2021 in [place] that showed a 3.6 cm cyst on the right ovary and a small umbilical hernia that did not contain any bowel.  I reviewed all these findings with her on the report.  The appendix was noted as normal on that CT scan.  I explained to her that current ACOG guidelines state that screening Pap smears are no longer recommended after the age of 65, nor after hysterectomy for benign reasons.    OB History        2    Para   2    Term   2            AB        Living           SAB        IAB        Ectopic        Molar        Multiple        Live Births                    Current contraception: post menopausal status  History of abnormal Pap smear: no  Family history of uterine, colon or ovarian cancer: no  History of abnormal mammogram: no  Family history of breast cancer: no  Last Pap :   Last mammogram: 2021  Last colonoscopy:   Last DEXA: Not yet done      Past Medical History:   Diagnosis Date   • Anxiety    • Arthritis    • Hypertension        Past Surgical History:   Procedure Laterality Date   •  "COLONOSCOPY           Current Outpatient Medications:   •  aspirin 325 MG tablet, Take 325 mg by mouth Daily. prn, Disp: , Rfl:   •  escitalopram (LEXAPRO) 5 MG tablet, TAKE 1 TABLET BY MOUTH DAILY, Disp: 30 tablet, Rfl: 1  •  hydroCHLOROthiazide (HYDRODIURIL) 12.5 MG tablet, Take 1 tablet by mouth Daily., Disp: 30 tablet, Rfl: 1  •  lisinopril (PRINIVIL,ZESTRIL) 40 MG tablet, Take 1 tablet by mouth Daily., Disp: 30 tablet, Rfl: 1  •  nicotine (NICODERM CQ) 21 MG/24HR patch, Place 1 patch on the skin as directed by provider Daily. prn, Disp: , Rfl:     No Known Allergies    Social History     Tobacco Use   • Smoking status: Current Every Day Smoker     Packs/day: 1.00     Types: Cigarettes     Start date: 1980   • Smokeless tobacco: Never Used   • Tobacco comment: SMOKED 21-30 YEARS   Vaping Use   • Vaping Use: Never used   Substance Use Topics   • Alcohol use: Yes     Comment: DRINKS MONTHLY BEER AND LIQUOR   • Drug use: Never       Family History   Problem Relation Age of Onset   • Diabetes Mother    • Cancer Mother    • Arthritis Mother    • Heart disease Father        Review of Systems   Constitutional: Negative for fatigue and fever.   Gastrointestinal: Positive for abdominal pain. Negative for diarrhea.   Genitourinary: Positive for pelvic pain. Negative for dysuria, frequency and vaginal bleeding.       /80   Ht 167.6 cm (65.98\")   Wt 101 kg (223 lb)   LMP 10/08/2016 (Approximate) Comment: 2017  BMI 36.01 kg/m²     Physical Exam  Constitutional:       Appearance: She is obese.   Genitourinary:      Bladder and urethral meatus normal.      No lesions in the vagina.      Right Labia: No lesions or skin changes.     Left Labia: No lesions or skin changes.     No vaginal bleeding.      No vaginal prolapse present.     No vaginal atrophy present.       Right Adnexa: tender.     Right Adnexa: not full and no mass present.     Left Adnexa: not tender, not full and no mass present.     No cervical motion " tenderness, discharge or lesion.      Uterus is not enlarged, tender or prolapsed.      No uterine mass detected.     Uterus is midaxial.   Neurological:      Mental Status: She is alert.   Vitals reviewed.             Assessment     1) GYN annual well woman exam.   2) pelvic pain.  History of 3.6 cm right ovarian cyst on CT scan in June 2021.  Discussed with the patient the need for follow-up ultrasound and that usually recommended 6 to 8 weeks after a cyst is seen for appropriate follow-up.  I did discuss that cyst can be completely asymptomatic and most are benign even in postmenopausal years.  I discussed with her that her discomfort sounds more gastrointestinal in nature but that the cyst would still need to be followed up.  I offered her an ultrasound to be done today since she lives over an hour away but she declined and states that she may come back in January 2022 to have it done.     Plan     1) Breast Health - Clinical breast exam & mammogram reviewed specifically American Cancer Society recommendations for screening specific to her, and Self breast awareness monthly  2) Pap -done with high risk HPV.  May repeat in 3 years if normal.  3) Smoking status-positive daily tobacco use.  4) Colon health - screening colonoscopy recommended if not up to date  5) Bone health - Weight bearing exercise, dietary calcium recommendations and vitamin D reviewed.   6) Encouraged to be wary of information obtained via social media and internet based on source and search.   7) Follow up prn and one year      Kwame Lew MD   12/1/2021  11:06 EST

## 2021-12-03 LAB
CONV .: NORMAL
CYTOLOGIST CVX/VAG CYTO: NORMAL
CYTOLOGY CVX/VAG DOC CYTO: NORMAL
CYTOLOGY CVX/VAG DOC THIN PREP: NORMAL
DX ICD CODE: NORMAL
HIV 1 & 2 AB SER-IMP: NORMAL
OTHER STN SPEC: NORMAL
STAT OF ADQ CVX/VAG CYTO-IMP: NORMAL

## 2021-12-16 DIAGNOSIS — I10 PRIMARY HYPERTENSION: ICD-10-CM

## 2021-12-16 RX ORDER — LISINOPRIL 40 MG/1
40 TABLET ORAL DAILY
Qty: 30 TABLET | Refills: 1 | Status: SHIPPED | OUTPATIENT
Start: 2021-12-16 | End: 2021-12-28 | Stop reason: SDUPTHER

## 2021-12-16 RX ORDER — HYDROCHLOROTHIAZIDE 12.5 MG/1
12.5 TABLET ORAL DAILY
Qty: 30 TABLET | Refills: 1 | Status: SHIPPED | OUTPATIENT
Start: 2021-12-16 | End: 2021-12-28 | Stop reason: SDUPTHER

## 2021-12-28 RX ORDER — ESCITALOPRAM OXALATE 5 MG/1
5 TABLET ORAL DAILY
Qty: 30 TABLET | Refills: 5 | Status: CANCELLED | OUTPATIENT
Start: 2021-12-28

## 2021-12-29 ENCOUNTER — OFFICE VISIT (OUTPATIENT)
Dept: FAMILY MEDICINE CLINIC | Facility: CLINIC | Age: 56
End: 2021-12-29

## 2021-12-29 VITALS
WEIGHT: 223 LBS | HEIGHT: 66 IN | SYSTOLIC BLOOD PRESSURE: 118 MMHG | HEART RATE: 72 BPM | DIASTOLIC BLOOD PRESSURE: 85 MMHG | BODY MASS INDEX: 35.84 KG/M2 | OXYGEN SATURATION: 97 %

## 2021-12-29 DIAGNOSIS — R51.9 CHRONIC NONINTRACTABLE HEADACHE, UNSPECIFIED HEADACHE TYPE: Primary | ICD-10-CM

## 2021-12-29 DIAGNOSIS — G89.29 CHRONIC NONINTRACTABLE HEADACHE, UNSPECIFIED HEADACHE TYPE: Primary | ICD-10-CM

## 2021-12-29 DIAGNOSIS — R20.2 PARESTHESIA OF LEFT UPPER EXTREMITY: ICD-10-CM

## 2021-12-29 DIAGNOSIS — F41.9 ANXIETY: ICD-10-CM

## 2021-12-29 DIAGNOSIS — I10 PRIMARY HYPERTENSION: ICD-10-CM

## 2021-12-29 PROCEDURE — 99214 OFFICE O/P EST MOD 30 MIN: CPT | Performed by: NURSE PRACTITIONER

## 2021-12-29 RX ORDER — LISINOPRIL 40 MG/1
40 TABLET ORAL DAILY
Qty: 90 TABLET | Refills: 1 | Status: SHIPPED | OUTPATIENT
Start: 2021-12-29 | End: 2022-06-28 | Stop reason: SDUPTHER

## 2021-12-29 RX ORDER — HYDROCHLOROTHIAZIDE 12.5 MG/1
12.5 TABLET ORAL DAILY
Qty: 90 TABLET | Refills: 1 | Status: SHIPPED | OUTPATIENT
Start: 2021-12-29 | End: 2022-06-28 | Stop reason: SDUPTHER

## 2021-12-29 NOTE — ASSESSMENT & PLAN NOTE
Hypertension is well controlled at present, manual recheck of blood pressure 118/78.  Continue lisinopril and hydrochlorothiazide.  We will reassess in 6 months, sooner if patient has any changes.

## 2021-12-29 NOTE — PROGRESS NOTES
"Chief Complaint  Hypertension (hctz, lisinopril) and Anxiety    Subjective          Jewels Beltrán presents to [facility] for   History of Present Illness    Patient presents today to follow-up on hypertension and anxiety.  States she decided not to start the Lexapro and has been doing well.  Patient states that she has been monitoring her blood pressure regularly at home and for the most part readings have been good, still having some elevations in the diastolic readings.  Patient states that she continues to have a frequent pressure type of headache, and that she is also still having some tightness and paresthesia-like symptoms in her left arm at times.  Medical History  Past Medical History:   Diagnosis Date   • Anxiety    • Arthritis    • Hypertension      Surgical History  Past Surgical History:   Procedure Laterality Date   • COLONOSCOPY       Social History  Social History     Socioeconomic History   • Marital status:    Tobacco Use   • Smoking status: Current Every Day Smoker     Packs/day: 1.00     Types: Cigarettes     Start date: 1980   • Smokeless tobacco: Never Used   • Tobacco comment: SMOKED 21-30 YEARS   Vaping Use   • Vaping Use: Never used   Substance and Sexual Activity   • Alcohol use: Yes     Comment: DRINKS MONTHLY BEER AND LIQUOR   • Drug use: Never   • Sexual activity: Not Currently       Current Outpatient Medications:   •  aspirin 325 MG tablet, Take 325 mg by mouth Daily. prn, Disp: , Rfl:   •  hydroCHLOROthiazide (HYDRODIURIL) 12.5 MG tablet, Take 1 tablet by mouth Daily., Disp: 90 tablet, Rfl: 1  •  lisinopril (PRINIVIL,ZESTRIL) 40 MG tablet, Take 1 tablet by mouth Daily., Disp: 90 tablet, Rfl: 1    Review of Systems     Objective     /85   Pulse 72   Ht 167.6 cm (65.98\")   Wt 101 kg (223 lb)   SpO2 97%   BMI 36.01 kg/m²     Body mass index is 36.01 kg/m².     Manual BP check 118/78.     Physical Exam  Vitals reviewed. "   Constitutional:       Appearance: Normal appearance. She is well-developed.   HENT:      Head: Normocephalic and atraumatic.      Right Ear: External ear normal.      Left Ear: External ear normal.   Eyes:      Conjunctiva/sclera: Conjunctivae normal.      Pupils: Pupils are equal, round, and reactive to light.   Cardiovascular:      Rate and Rhythm: Normal rate and regular rhythm.      Heart sounds: No murmur heard.  No friction rub. No gallop.    Pulmonary:      Effort: Pulmonary effort is normal.      Breath sounds: Normal breath sounds. No wheezing or rhonchi.   Skin:     General: Skin is warm and dry.   Neurological:      Mental Status: She is alert and oriented to person, place, and time.      Cranial Nerves: No cranial nerve deficit.   Psychiatric:         Mood and Affect: Mood and affect normal.         Behavior: Behavior normal.         Thought Content: Thought content normal.         Judgment: Judgment normal.         Result Review :     The following data was reviewed by: AYAN Garzon on 12/29/2021:    CMP    CMP 6/18/21 7/7/21 11/4/21   Glucose 95 92 87   BUN 13 17 15   Creatinine 0.77 0.90 0.77   eGFR Non African Am 77 65 77   Sodium 138 138 138   Potassium 3.8 4.3 3.6   Chloride 105 109 (A) 102   Calcium 10.1 10.1 10.1   Albumin 4.60  4.50   Total Bilirubin 0.7  0.4   Alkaline Phosphatase 82  91   AST (SGOT) 21  21   ALT (SGPT) 29  24   (A) Abnormal value            CBC    CBC 6/18/21 7/7/21 11/4/21   WBC 9.92 11.99 (A) 9.65   RBC 4.81 4.90 4.81   Hemoglobin 14.1 14.3 14.4   Hematocrit 42.3 42.7 42.5   MCV 87.9 87.1 88.4   MCH 29.3 29.2 29.9   MCHC 33.3 33.5 33.9   RDW 13.4 13.2 13.6   Platelets 295 322 281   (A) Abnormal value            Lipid Panel    Lipid Panel 5/20/21   Total Cholesterol 172   Triglycerides 123   HDL Cholesterol 40   VLDL Cholesterol 25   LDL Cholesterol  107 (A)   (A) Abnormal value       Comments are available for some flowsheets but are not being displayed.                               Assessment:  Diagnoses and all orders for this visit:    1. Chronic nonintractable headache, unspecified headache type (Primary)  -     Ambulatory Referral to Neurology    2. Anxiety  Assessment & Plan:  Patient is doing well without medication per her report, for now we will continue to monitor.      3. Primary hypertension  Assessment & Plan:  Hypertension is well controlled at present, manual recheck of blood pressure 118/78.  Continue lisinopril and hydrochlorothiazide.  We will reassess in 6 months, sooner if patient has any changes.    Orders:  -     hydroCHLOROthiazide (HYDRODIURIL) 12.5 MG tablet; Take 1 tablet by mouth Daily.  Dispense: 90 tablet; Refill: 1  -     lisinopril (PRINIVIL,ZESTRIL) 40 MG tablet; Take 1 tablet by mouth Daily.  Dispense: 90 tablet; Refill: 1    4. Paresthesia of left upper extremity  -     EMG & Nerve Conduction Test; Future          Follow Up     Return in about 6 months (around 6/29/2022), or if symptoms worsen or fail to improve.    Patient was given instructions and counseling regarding her condition or for health maintenance advice. Please see specific information pulled into the AVS if appropriate.     Ashly Andrew, APRN  12/29/2021

## 2022-02-02 ENCOUNTER — OFFICE VISIT (OUTPATIENT)
Dept: NEUROLOGY | Facility: CLINIC | Age: 57
End: 2022-02-02

## 2022-02-02 ENCOUNTER — LAB (OUTPATIENT)
Dept: LAB | Facility: HOSPITAL | Age: 57
End: 2022-02-02

## 2022-02-02 VITALS
HEART RATE: 76 BPM | DIASTOLIC BLOOD PRESSURE: 74 MMHG | HEIGHT: 66 IN | WEIGHT: 225.3 LBS | SYSTOLIC BLOOD PRESSURE: 113 MMHG | BODY MASS INDEX: 36.21 KG/M2

## 2022-02-02 DIAGNOSIS — R20.2 PARESTHESIA OF LEFT ARM: ICD-10-CM

## 2022-02-02 DIAGNOSIS — R51.9 NEW ONSET HEADACHE: Primary | ICD-10-CM

## 2022-02-02 DIAGNOSIS — M62.89 MUSCLE TIGHTNESS: ICD-10-CM

## 2022-02-02 DIAGNOSIS — R51.9 NEW ONSET HEADACHE: ICD-10-CM

## 2022-02-02 LAB
25(OH)D3 SERPL-MCNC: 34.3 NG/ML (ref 30–100)
ALBUMIN SERPL-MCNC: 4.3 G/DL (ref 3.5–5.2)
ALBUMIN/GLOB SERPL: 1.5 G/DL
ALP SERPL-CCNC: 80 U/L (ref 39–117)
ALT SERPL W P-5'-P-CCNC: 25 U/L (ref 1–33)
ANION GAP SERPL CALCULATED.3IONS-SCNC: 9.6 MMOL/L (ref 5–15)
AST SERPL-CCNC: 18 U/L (ref 1–32)
BILIRUB SERPL-MCNC: 0.5 MG/DL (ref 0–1.2)
BUN SERPL-MCNC: 19 MG/DL (ref 6–20)
BUN/CREAT SERPL: 20.2 (ref 7–25)
CALCIUM SPEC-SCNC: 10.4 MG/DL (ref 8.6–10.5)
CHLORIDE SERPL-SCNC: 104 MMOL/L (ref 98–107)
CK SERPL-CCNC: 40 U/L (ref 20–180)
CO2 SERPL-SCNC: 27.4 MMOL/L (ref 22–29)
CREAT SERPL-MCNC: 0.94 MG/DL (ref 0.57–1)
CRP SERPL-MCNC: 1.01 MG/DL (ref 0–0.5)
DEPRECATED RDW RBC AUTO: 43.2 FL (ref 37–54)
ERYTHROCYTE [DISTWIDTH] IN BLOOD BY AUTOMATED COUNT: 13 % (ref 12.3–15.4)
ERYTHROCYTE [SEDIMENTATION RATE] IN BLOOD: 26 MM/HR (ref 0–30)
FOLATE SERPL-MCNC: 16.7 NG/ML (ref 4.78–24.2)
GFR SERPL CREATININE-BSD FRML MDRD: 61 ML/MIN/1.73
GLOBULIN UR ELPH-MCNC: 2.8 GM/DL
GLUCOSE SERPL-MCNC: 89 MG/DL (ref 65–99)
HCT VFR BLD AUTO: 44.2 % (ref 34–46.6)
HGB BLD-MCNC: 14.2 G/DL (ref 12–15.9)
MCH RBC QN AUTO: 29.2 PG (ref 26.6–33)
MCHC RBC AUTO-ENTMCNC: 32.1 G/DL (ref 31.5–35.7)
MCV RBC AUTO: 90.8 FL (ref 79–97)
PLATELET # BLD AUTO: 298 10*3/MM3 (ref 140–450)
PMV BLD AUTO: 11.7 FL (ref 6–12)
POTASSIUM SERPL-SCNC: 3.8 MMOL/L (ref 3.5–5.2)
PROT SERPL-MCNC: 7.1 G/DL (ref 6–8.5)
RBC # BLD AUTO: 4.87 10*6/MM3 (ref 3.77–5.28)
SODIUM SERPL-SCNC: 141 MMOL/L (ref 136–145)
TSH SERPL DL<=0.05 MIU/L-ACNC: 0.95 UIU/ML (ref 0.27–4.2)
VIT B12 BLD-MCNC: 443 PG/ML (ref 211–946)
WBC NRBC COR # BLD: 8.08 10*3/MM3 (ref 3.4–10.8)

## 2022-02-02 PROCEDURE — 82607 VITAMIN B-12: CPT

## 2022-02-02 PROCEDURE — 86334 IMMUNOFIX E-PHORESIS SERUM: CPT

## 2022-02-02 PROCEDURE — 82085 ASSAY OF ALDOLASE: CPT

## 2022-02-02 PROCEDURE — 84155 ASSAY OF PROTEIN SERUM: CPT

## 2022-02-02 PROCEDURE — 36415 COLL VENOUS BLD VENIPUNCTURE: CPT

## 2022-02-02 PROCEDURE — 80050 GENERAL HEALTH PANEL: CPT

## 2022-02-02 PROCEDURE — 85652 RBC SED RATE AUTOMATED: CPT

## 2022-02-02 PROCEDURE — 82550 ASSAY OF CK (CPK): CPT

## 2022-02-02 PROCEDURE — 86140 C-REACTIVE PROTEIN: CPT

## 2022-02-02 PROCEDURE — 99215 OFFICE O/P EST HI 40 MIN: CPT | Performed by: NURSE PRACTITIONER

## 2022-02-02 PROCEDURE — 84165 PROTEIN E-PHORESIS SERUM: CPT

## 2022-02-02 PROCEDURE — 82306 VITAMIN D 25 HYDROXY: CPT

## 2022-02-02 PROCEDURE — 82746 ASSAY OF FOLIC ACID SERUM: CPT

## 2022-02-02 PROCEDURE — 82784 ASSAY IGA/IGD/IGG/IGM EACH: CPT

## 2022-02-02 NOTE — PROGRESS NOTES
"Chief Complaint  Headache (CT 8/2021)    Subjective          Jewels Beltrán presents to [facility]  States she started having frequent headaches and chest pain in May.  States that she was diagnosed with hypertension at that time and has had blood pressure managed. Experiencing intermittent \"pressure\" to left arm, left upper back, and abdomen.  Saw cardiology and had negative cardiac workup.  PCP has ordered EMG of left arm and she is scheduled for that next week. Had CT for lung cancer screening, was normal.  Has a vaginal ultrasound scheduled.  States she will have a \"spell\" where she will have tension in her shoulders, then a squeezing sensation in the head, some dizziness, feels a squeezing sensation to left arm, and a pressure sensation in the abdomen.  Spells are occurring about 3 times per week.  The unwell feeling can last all day.  With the headaches, sometimes needs to sit down.  No significant photophobia, phonophobia, or nausea.        Objective   Vital Signs:   /74   Pulse 76   Ht 167.6 cm (66\")   Wt 102 kg (225 lb 4.8 oz)   BMI 36.36 kg/m²     Physical Exam  HENT:      Head: Normocephalic.   Pulmonary:      Effort: Pulmonary effort is normal.   Neurological:      Mental Status: She is alert and oriented to person, place, and time.      Cranial Nerves: Cranial nerves are intact.      Sensory: Sensation is intact.      Motor: Motor function is intact.      Coordination: Coordination is intact.      Gait: Gait is intact.      Deep Tendon Reflexes: Strength normal.      Reflex Scores:       Bicep reflexes are 2+ on the right side and 3+ on the left side.       Brachioradialis reflexes are 2+ on the right side and 3+ on the left side.       Patellar reflexes are 1+ on the right side and 1+ on the left side.       Neurologic Exam     Mental Status   Oriented to person, place, and time.     Cranial Nerves   Cranial nerves II through XII intact.     Motor " Exam   Muscle bulk: normal  Overall muscle tone: normal    Strength   Strength 5/5 throughout.     Sensory Exam   Light touch normal.   Pinprick normal.     Gait, Coordination, and Reflexes     Gait  Gait: normal    Tremor   Resting tremor: absent  Intention tremor: absent    Reflexes   Right brachioradialis: 2+  Left brachioradialis: 3+  Right biceps: 2+  Left biceps: 3+  Right patellar: 1+  Left patellar: 1+       Result Review :               Assessment and Plan    Diagnoses and all orders for this visit:    1. New onset headache (Primary)  -     MRI Brain With & Without Contrast; Future  -     Duplex Carotid Ultrasound CAR; Future  -     Vitamin B12 & Folate; Future  -     Protein Elec + Interp, Serum; Future  -     CK; Future  -     Aldolase; Future  -     Vitamin D 25 Hydroxy; Future  -     CBC (No Diff); Future  -     Comprehensive Metabolic Panel; Future  -     Immunofixation, Serum; Future  -     C-reactive Protein; Future  -     Sedimentation Rate; Future  -     TSH Rfx On Abnormal To Free T4; Future    2. Paresthesia of left arm  -     MRI Brain With & Without Contrast; Future  -     Duplex Carotid Ultrasound CAR; Future  -     Vitamin B12 & Folate; Future  -     Protein Elec + Interp, Serum; Future  -     CK; Future  -     Aldolase; Future  -     Vitamin D 25 Hydroxy; Future  -     CBC (No Diff); Future  -     Comprehensive Metabolic Panel; Future  -     Immunofixation, Serum; Future  -     C-reactive Protein; Future  -     Sedimentation Rate; Future  -     TSH Rfx On Abnormal To Free T4; Future    3. Muscle tightness  -     MRI Brain With & Without Contrast; Future  -     Duplex Carotid Ultrasound CAR; Future  -     Vitamin B12 & Folate; Future  -     Protein Elec + Interp, Serum; Future  -     CK; Future  -     Aldolase; Future  -     Vitamin D 25 Hydroxy; Future  -     CBC (No Diff); Future  -     Comprehensive Metabolic Panel; Future  -     Immunofixation, Serum; Future  -     C-reactive Protein;  Future  -     Sedimentation Rate; Future  -     TSH Rfx On Abnormal To Free T4; Future      I spent 60 minutes caring for Jewels on this date of service. This time includes time spent by me in the following activities:preparing for the visit, reviewing tests, obtaining and/or reviewing a separately obtained history, performing a medically appropriate examination and/or evaluation , counseling and educating the patient/family/caregiver, ordering medications, tests, or procedures, documenting information in the medical record and independently interpreting results and communicating that information with the patient/family/caregiver  Follow Up   Return in about 2 months (around 4/2/2022) for ha, dizziness, paresthesia.  Patient was given instructions and counseling regarding her condition or for health maintenance advice. Please see specific information pulled into the AVS if appropriate.

## 2022-02-03 PROBLEM — R51.9 NEW ONSET HEADACHE: Status: ACTIVE | Noted: 2022-02-03

## 2022-02-03 NOTE — ASSESSMENT & PLAN NOTE
Discussed that symptoms could be an atyptical migraine presentation.  Will order MRI brain due to new onset headache over age 50.

## 2022-02-04 LAB
ALBUMIN SERPL ELPH-MCNC: 3.8 G/DL (ref 2.9–4.4)
ALBUMIN/GLOB SERPL: 1.2 {RATIO} (ref 0.7–1.7)
ALDOLASE SERPL-CCNC: 2.8 U/L (ref 3.3–10.3)
ALPHA1 GLOB SERPL ELPH-MCNC: 0.2 G/DL (ref 0–0.4)
ALPHA2 GLOB SERPL ELPH-MCNC: 0.7 G/DL (ref 0.4–1)
B-GLOBULIN SERPL ELPH-MCNC: 1.2 G/DL (ref 0.7–1.3)
GAMMA GLOB SERPL ELPH-MCNC: 1.1 G/DL (ref 0.4–1.8)
GLOBULIN SER CALC-MCNC: 3.2 G/DL (ref 2.2–3.9)
IGA SERPL-MCNC: 144 MG/DL (ref 87–352)
IGG SERPL-MCNC: 1041 MG/DL (ref 586–1602)
IGM SERPL-MCNC: 120 MG/DL (ref 26–217)
LABORATORY COMMENT REPORT: NORMAL
M PROTEIN SERPL ELPH-MCNC: NORMAL G/DL
PROT PATTERN SERPL ELPH-IMP: NORMAL
PROT PATTERN SERPL IFE-IMP: NORMAL
PROT SERPL-MCNC: 7 G/DL (ref 6–8.5)

## 2022-02-09 ENCOUNTER — PROCEDURE VISIT (OUTPATIENT)
Dept: NEUROLOGY | Facility: CLINIC | Age: 57
End: 2022-02-09

## 2022-02-09 VITALS
SYSTOLIC BLOOD PRESSURE: 116 MMHG | DIASTOLIC BLOOD PRESSURE: 65 MMHG | HEIGHT: 66 IN | HEART RATE: 78 BPM | WEIGHT: 225 LBS | BODY MASS INDEX: 36.16 KG/M2

## 2022-02-09 DIAGNOSIS — R20.2 PARESTHESIA OF LEFT UPPER EXTREMITY: ICD-10-CM

## 2022-02-09 DIAGNOSIS — R20.2 PARESTHESIA OF LEFT ARM: ICD-10-CM

## 2022-02-09 DIAGNOSIS — G56.02 CARPAL TUNNEL SYNDROME OF LEFT WRIST: Primary | ICD-10-CM

## 2022-02-09 PROCEDURE — 95909 NRV CNDJ TST 5-6 STUDIES: CPT | Performed by: PSYCHIATRY & NEUROLOGY

## 2022-02-09 PROCEDURE — 99214 OFFICE O/P EST MOD 30 MIN: CPT | Performed by: PSYCHIATRY & NEUROLOGY

## 2022-02-09 NOTE — ASSESSMENT & PLAN NOTE
She has a feeling of squeezing sensation in her her left hand associated with a spell of headache, lightheadedness, chest pain, abdominal discomfort. An extensive work-up is underway at this time. I would recommend treating her symptoms with amitriptyline.

## 2022-02-09 NOTE — ASSESSMENT & PLAN NOTE
Nerve conduction study is abnormal and shows electrophysiologic evidence for mild carpal tunnel syndrome. Clinically this is not contributing to her symptoms.

## 2022-02-09 NOTE — PROGRESS NOTES
"Chief Complaint  Numbness (BUE L>R) and Pain (LUE)    Subjective          Jewels Beltrán is a 56 y.o. female who presents to [facility]  History of Present Illness  56-year-old woman evaluated for symptoms of left arm tightness and squeezing feeling since October last year. She states that it happens at least 2 to 3 days a week and it can last for half an hour and then it comes back again. She states that it is a squeezing feeling in her left forearm. She states that she gets some intermittent numbness in her hand but it is not usually the case. She states that this left arm squeezing feeling comes with the spell. She is been having a spell for almost a year. She states that she gets a sharp pain in her right side of her head that can last for hours at a time and there is also discomfort in her neck and shoulders, chest and abdomen during this time. She gets lightheaded and dizzy.    She has occasional numbness in her left hand.  Objective   Vital Signs:   /65   Pulse 78   Ht 167.6 cm (66\")   Wt 102 kg (225 lb)   BMI 36.32 kg/m²     Physical Exam   She is alert, fluent, phasic, follows commands well. There is no weakness of the upper extremity individual muscle testing. Sensory symmetrical to pinprick. Reflexes are normoactive symmetrical biceps, triceps, brachioradialis, patellar's. Hyperventilation for 2 minutes reproduce symptoms of dizziness similar to what she usually experiences when she has a spell.        Assessment and Plan  Diagnoses and all orders for this visit:    1. Carpal tunnel syndrome of left wrist (Primary)  Assessment & Plan:  Nerve conduction study is abnormal and shows electrophysiologic evidence for mild carpal tunnel syndrome. Clinically this is not contributing to her symptoms.      2. Paresthesia of left upper extremity  -     EMG & Nerve Conduction Test    3. Paresthesia of left arm  Assessment & Plan:  She has a feeling of squeezing " sensation in her her left hand associated with a spell of headache, lightheadedness, chest pain, abdominal discomfort. An extensive work-up is underway at this time. I would recommend treating her symptoms with amitriptyline.         Nerve Conduction Study:  6 nerves     EMG:  Not performed    Total time spent with the patient and coordinating patient care was 30 minutes.    Follow Up  No follow-ups on file.  Patient was given instructions and counseling regarding her condition or for health maintenance advice. Please see specific information pulled into the AVS if appropriate.

## 2022-02-10 ENCOUNTER — TELEPHONE (OUTPATIENT)
Dept: FAMILY MEDICINE CLINIC | Facility: CLINIC | Age: 57
End: 2022-02-10

## 2022-02-10 NOTE — TELEPHONE ENCOUNTER
----- Message from AYAN Carey sent at 2/10/2022 10:11 AM EST -----  Mild left carpel tunnel syndrome consult [facility] for evaluation

## 2022-02-16 ENCOUNTER — OFFICE VISIT (OUTPATIENT)
Dept: OBSTETRICS AND GYNECOLOGY | Facility: CLINIC | Age: 57
End: 2022-02-16

## 2022-02-16 VITALS
BODY MASS INDEX: 36.61 KG/M2 | HEIGHT: 66 IN | SYSTOLIC BLOOD PRESSURE: 120 MMHG | WEIGHT: 227.8 LBS | DIASTOLIC BLOOD PRESSURE: 64 MMHG

## 2022-02-16 DIAGNOSIS — D25.2 SUBSEROUS LEIOMYOMA OF UTERUS: Primary | ICD-10-CM

## 2022-02-16 PROCEDURE — 99213 OFFICE O/P EST LOW 20 MIN: CPT | Performed by: OBSTETRICS & GYNECOLOGY

## 2022-02-16 NOTE — PROGRESS NOTES
CC: Ultrasound follow-up  Patient seen today after having had an outside CT scan several months ago with an ovarian cyst and right pelvic pain.  She came in for interval follow-up today.  She states the pain is largely resolved and is not causing her any problems.  She had an ultrasound today and looks to be consistent with a possible pedunculated fibroid measuring 4.3 x 4.1 cm in the right pelvis.  The right ovary appears to be normal and the left ovary is not clearly seen.  I discussed the nature of fibroids, in particular pedunculated fibroids and there incidence and general benign nature.  We arrived at a plan of following this for now and she will come back in about 8 months for her annual visit and we will plan to do a transvaginal scan on that date as well.  Assessment: Pedunculated fibroid of the uterus  Plan: Return in 8 months for annual exam and Pap smear as well as follow-up pelvic ultrasound.  I spent 20 minutes caring for Jewels on this date of service. This time includes time spent by me in the following activities: preparing for the visit, reviewing tests, obtaining and/or reviewing a separately obtained history, counseling and educating the patient/family/caregiver, ordering medications, tests, or procedures and documenting information in the medical record

## 2022-02-21 ENCOUNTER — TELEPHONE (OUTPATIENT)
Dept: NEUROLOGY | Facility: CLINIC | Age: 57
End: 2022-02-21

## 2022-02-21 NOTE — TELEPHONE ENCOUNTER
Provider:  PELON AGUSTIN   Caller:  [facility] WITH AUTHORIZATION     Phone Number: [phone]  Reason for Call:  [facility] CALLING TO NOTIFY CLAIM DENIED IF PROVIDER WANTS A PEER TO PEER MAY CONTACT # [phone]  ORDER ID # [id number]

## 2022-02-22 NOTE — TELEPHONE ENCOUNTER
[facility] IS CALLING NEEDS A ANSWER BY 1:00PM IF THEY NEED TO HOLD MRI OR CANCEL CPT CODE IS 23515 . NEED TO NO IF  IS GOING TO DO PEAR TO PEAR. OR PERMISSION TO CANCEL     PLEASE ADVISE. ASAP.     PHONE # [phone]

## 2022-02-22 NOTE — TELEPHONE ENCOUNTER
Was not denied clinically, location was out of network.  Require [facility] in [place]    Auth #:[id number]    Until March 16, 2022    [facility] [address] [place]     Phone: [phone]     Fax: [phone]

## 2022-02-23 ENCOUNTER — HOSPITAL ENCOUNTER (OUTPATIENT)
Dept: CARDIOLOGY | Facility: HOSPITAL | Age: 57
Discharge: HOME | End: 2022-02-23

## 2022-02-23 ENCOUNTER — HOSPITAL ENCOUNTER (OUTPATIENT)
Dept: MRI IMAGING | Facility: HOSPITAL | Age: 57
Discharge: HOME | End: 2022-02-23

## 2022-02-23 DIAGNOSIS — R51.9 NEW ONSET HEADACHE: ICD-10-CM

## 2022-02-23 DIAGNOSIS — R20.2 PARESTHESIA OF LEFT ARM: ICD-10-CM

## 2022-02-23 DIAGNOSIS — M62.89 MUSCLE TIGHTNESS: ICD-10-CM

## 2022-02-23 LAB
BH CV XLRA MEAS LEFT CAROTID BULB EDV: 11 CM/SEC
BH CV XLRA MEAS LEFT CAROTID BULB PSV: 31 CM/SEC
BH CV XLRA MEAS LEFT DIST CCA EDV: 27 CM/SEC
BH CV XLRA MEAS LEFT DIST CCA PSV: 74 CM/SEC
BH CV XLRA MEAS LEFT DIST ICA EDV: 35 CM/SEC
BH CV XLRA MEAS LEFT DIST ICA PSV: 74 CM/SEC
BH CV XLRA MEAS LEFT MID ICA EDV: 34 CM/SEC
BH CV XLRA MEAS LEFT MID ICA PSV: 69 CM/SEC
BH CV XLRA MEAS LEFT PROX CCA EDV: 23 CM/SEC
BH CV XLRA MEAS LEFT PROX CCA PSV: 77 CM/SEC
BH CV XLRA MEAS LEFT PROX ECA EDV: 22 CM/SEC
BH CV XLRA MEAS LEFT PROX ECA PSV: 80 CM/SEC
BH CV XLRA MEAS LEFT PROX ICA EDV: 17 CM/SEC
BH CV XLRA MEAS LEFT PROX ICA PSV: 40 CM/SEC
BH CV XLRA MEAS LEFT VERTEBRAL A EDV: 19 CM/SEC
BH CV XLRA MEAS LEFT VERTEBRAL A PSV: 47 CM/SEC
BH CV XLRA MEAS RIGHT CAROTID BULB EDV: 20 CM/SEC
BH CV XLRA MEAS RIGHT CAROTID BULB PSV: 52 CM/SEC
BH CV XLRA MEAS RIGHT DIST CCA EDV: 26 CM/SEC
BH CV XLRA MEAS RIGHT DIST CCA PSV: 70 CM/SEC
BH CV XLRA MEAS RIGHT DIST ICA EDV: 23 CM/SEC
BH CV XLRA MEAS RIGHT DIST ICA PSV: 52 CM/SEC
BH CV XLRA MEAS RIGHT MID ICA EDV: 27 CM/SEC
BH CV XLRA MEAS RIGHT MID ICA PSV: 62 CM/SEC
BH CV XLRA MEAS RIGHT PROX CCA EDV: 17 CM/SEC
BH CV XLRA MEAS RIGHT PROX CCA PSV: 76 CM/SEC
BH CV XLRA MEAS RIGHT PROX ECA EDV: 17 CM/SEC
BH CV XLRA MEAS RIGHT PROX ECA PSV: 68 CM/SEC
BH CV XLRA MEAS RIGHT PROX ICA EDV: 17 CM/SEC
BH CV XLRA MEAS RIGHT PROX ICA PSV: 52 CM/SEC
BH CV XLRA MEAS RIGHT VERTEBRAL A EDV: 13 CM/SEC
BH CV XLRA MEAS RIGHT VERTEBRAL A PSV: 35 CM/SEC
LEFT ARM BP: NORMAL MMHG
MAXIMAL PREDICTED HEART RATE: 162 BPM
RIGHT ARM BP: NORMAL MMHG
STRESS TARGET HR: 138 BPM

## 2022-02-23 PROCEDURE — A9577 INJ MULTIHANCE: HCPCS | Performed by: NURSE PRACTITIONER

## 2022-02-23 PROCEDURE — 93880 EXTRACRANIAL BILAT STUDY: CPT

## 2022-02-23 PROCEDURE — 70553 MRI BRAIN STEM W/O & W/DYE: CPT

## 2022-02-23 PROCEDURE — 0 GADOBENATE DIMEGLUMINE 529 MG/ML SOLUTION: Performed by: NURSE PRACTITIONER

## 2022-02-23 RX ADMIN — GADOBENATE DIMEGLUMINE 20 ML: 529 INJECTION, SOLUTION INTRAVENOUS at 08:22

## 2022-06-29 ENCOUNTER — OFFICE VISIT (OUTPATIENT)
Dept: FAMILY MEDICINE CLINIC | Facility: CLINIC | Age: 57
End: 2022-06-29

## 2022-06-29 VITALS
HEIGHT: 66 IN | DIASTOLIC BLOOD PRESSURE: 78 MMHG | OXYGEN SATURATION: 99 % | BODY MASS INDEX: 36.8 KG/M2 | SYSTOLIC BLOOD PRESSURE: 126 MMHG | HEART RATE: 74 BPM | WEIGHT: 229 LBS

## 2022-06-29 DIAGNOSIS — Z11.59 NEED FOR HEPATITIS C SCREENING TEST: ICD-10-CM

## 2022-06-29 DIAGNOSIS — G47.9 SLEEP DISTURBANCE: ICD-10-CM

## 2022-06-29 DIAGNOSIS — Z12.31 BREAST CANCER SCREENING BY MAMMOGRAM: ICD-10-CM

## 2022-06-29 DIAGNOSIS — E66.01 CLASS 2 SEVERE OBESITY WITH SERIOUS COMORBIDITY AND BODY MASS INDEX (BMI) OF 36.0 TO 36.9 IN ADULT, UNSPECIFIED OBESITY TYPE: ICD-10-CM

## 2022-06-29 DIAGNOSIS — R40.0 HAS DAYTIME DROWSINESS: ICD-10-CM

## 2022-06-29 DIAGNOSIS — I10 PRIMARY HYPERTENSION: ICD-10-CM

## 2022-06-29 DIAGNOSIS — Z13.29 SCREENING FOR THYROID DISORDER: ICD-10-CM

## 2022-06-29 DIAGNOSIS — Z13.220 SCREENING FOR LIPID DISORDERS: Primary | ICD-10-CM

## 2022-06-29 PROCEDURE — 99214 OFFICE O/P EST MOD 30 MIN: CPT | Performed by: NURSE PRACTITIONER

## 2022-06-29 RX ORDER — HYDROCHLOROTHIAZIDE 12.5 MG/1
12.5 TABLET ORAL DAILY
Qty: 90 TABLET | Refills: 1 | Status: SHIPPED | OUTPATIENT
Start: 2022-06-29 | End: 2022-12-22

## 2022-06-29 RX ORDER — LISINOPRIL 40 MG/1
40 TABLET ORAL DAILY
Qty: 90 TABLET | Refills: 1 | Status: SHIPPED | OUTPATIENT
Start: 2022-06-29 | End: 2022-12-22

## 2022-06-29 NOTE — PROGRESS NOTES
"Chief Complaint  Hypertension (Lisinopril, HCTZ) and Fatigue (Patient says she is tired all the time, patient feels she does not get enough sleep throughout the night)     Subjective          Jewels Beltrán presents to [facility] for   History of Present Illness  Patient presents today to follow-up on chronic conditions including hypertension.  Patient states that her blood pressure has been staying well controlled.  Pt states onset of fatigue and loss  Of sleep approx 2 months. States still feels very tired even after sleeping all night. Pt states lives alone, not sure if she snores.  States she can feel like she slept good all night but feels exhausted when she wakes up.  Medical History   Past Medical History:   Diagnosis Date   • Anxiety    • Arthritis    • Hypertension      Surgical History  Past Surgical History:   Procedure Laterality Date   • COLONOSCOPY     • LAPAROSCOPIC TUBAL LIGATION       Social History  Social History     Socioeconomic History   • Marital status:    Tobacco Use   • Smoking status: Current Every Day Smoker     Packs/day: 1.00     Types: Cigarettes     Start date: 1980   • Smokeless tobacco: Never Used   • Tobacco comment: SMOKED 21-30 YEARS   Vaping Use   • Vaping Use: Never used   Substance and Sexual Activity   • Alcohol use: Yes     Comment: DRINKS MONTHLY BEER AND LIQUOR   • Drug use: Never   • Sexual activity: Not Currently       Current Outpatient Medications:   •  aspirin 325 MG tablet, Take 325 mg by mouth Daily. prn, Disp: , Rfl:   •  hydroCHLOROthiazide (HYDRODIURIL) 12.5 MG tablet, Take 1 tablet by mouth Daily., Disp: 90 tablet, Rfl: 1  •  lisinopril (PRINIVIL,ZESTRIL) 40 MG tablet, Take 1 tablet by mouth Daily., Disp: 90 tablet, Rfl: 1    Review of Systems     Objective     /78   Pulse 74   Ht 167.6 cm (65.98\")   Wt 104 kg (229 lb)   SpO2 99%   BMI 36.98 kg/m²     Body mass index is 36.98 kg/m².    Physical Exam  Vitals " reviewed.   Constitutional:       Appearance: Normal appearance. She is well-developed.   HENT:      Head: Normocephalic and atraumatic.      Right Ear: External ear normal.      Left Ear: External ear normal.      Mouth/Throat:      Pharynx: No oropharyngeal exudate.   Eyes:      Conjunctiva/sclera: Conjunctivae normal.      Pupils: Pupils are equal, round, and reactive to light.   Cardiovascular:      Rate and Rhythm: Normal rate and regular rhythm.      Heart sounds: No murmur heard.    No friction rub. No gallop.   Pulmonary:      Effort: Pulmonary effort is normal.      Breath sounds: Normal breath sounds. No wheezing or rhonchi.   Skin:     General: Skin is warm and dry.   Neurological:      Mental Status: She is alert and oriented to person, place, and time.      Cranial Nerves: No cranial nerve deficit.   Psychiatric:         Mood and Affect: Mood and affect normal.         Behavior: Behavior normal.         Thought Content: Thought content normal.         Judgment: Judgment normal.         Result Review :     The following data was reviewed by: AYAN Garzon on 06/29/2022:                         Assessment:  Diagnoses and all orders for this visit:    1. Screening for lipid disorders (Primary)  -     Lipid panel; Future    2. Primary hypertension  Assessment & Plan:  Hypertension is well controlled at present, continue current dose lisinopril HCTZ    Orders:  -     hydroCHLOROthiazide (HYDRODIURIL) 12.5 MG tablet; Take 1 tablet by mouth Daily.  Dispense: 90 tablet; Refill: 1  -     lisinopril (PRINIVIL,ZESTRIL) 40 MG tablet; Take 1 tablet by mouth Daily.  Dispense: 90 tablet; Refill: 1  -     Comprehensive metabolic panel; Future  -     CBC w AUTO Differential; Future  -     Ambulatory Referral to Sleep Medicine    3. Screening for thyroid disorder  -     TSH; Future    4. Need for hepatitis C screening test  -     Hepatitis C antibody; Future    5. Class 2 severe obesity with serious comorbidity  and body mass index (BMI) of 36.0 to 36.9 in adult, unspecified obesity type (HCC)  Comments:  Excess weight can contribute to possible sleep apnea, sleep study ordered, weight loss is encouraged  Orders:  -     Ambulatory Referral to Sleep Medicine    6. Sleep disturbance  Comments:  We will refer to sleep medicine for sleep study for further evaluation of complaints  Orders:  -     Ambulatory Referral to Sleep Medicine    7. Has daytime drowsiness  -     Ambulatory Referral to Sleep Medicine    8. Breast cancer screening by mammogram  -     Mammo Screening Digital Tomosynthesis Bilateral With CAD; Future              Follow Up     Return in about 6 months (around 12/29/2022).    Patient was given instructions and counseling regarding her condition or for health maintenance advice. Please see specific information pulled into the AVS if appropriate.     Ashly Andrew, APRN  06/29/2022

## 2022-07-08 ENCOUNTER — TELEPHONE (OUTPATIENT)
Dept: FAMILY MEDICINE CLINIC | Facility: CLINIC | Age: 57
End: 2022-07-08

## 2022-07-08 NOTE — TELEPHONE ENCOUNTER
Left message for patient to return call to the office regarding her lab work that needs to be done. Stated on VM that she will have to fast to have these labs done.

## 2022-07-25 ENCOUNTER — LAB (OUTPATIENT)
Dept: LAB | Facility: HOSPITAL | Age: 57
End: 2022-07-25

## 2022-07-25 DIAGNOSIS — I10 PRIMARY HYPERTENSION: ICD-10-CM

## 2022-07-25 DIAGNOSIS — Z13.29 SCREENING FOR THYROID DISORDER: ICD-10-CM

## 2022-07-25 DIAGNOSIS — Z13.220 SCREENING FOR LIPID DISORDERS: ICD-10-CM

## 2022-07-25 DIAGNOSIS — Z11.59 NEED FOR HEPATITIS C SCREENING TEST: ICD-10-CM

## 2022-07-25 LAB
ALBUMIN SERPL-MCNC: 4 G/DL (ref 3.5–5.2)
ALBUMIN/GLOB SERPL: 1.6 G/DL
ALP SERPL-CCNC: 67 U/L (ref 39–117)
ALT SERPL W P-5'-P-CCNC: 20 U/L (ref 1–33)
ANION GAP SERPL CALCULATED.3IONS-SCNC: 10.4 MMOL/L (ref 5–15)
AST SERPL-CCNC: 22 U/L (ref 1–32)
BASOPHILS # BLD AUTO: 0.04 10*3/MM3 (ref 0–0.2)
BASOPHILS NFR BLD AUTO: 0.5 % (ref 0–1.5)
BILIRUB SERPL-MCNC: 0.4 MG/DL (ref 0–1.2)
BUN SERPL-MCNC: 17 MG/DL (ref 6–20)
BUN/CREAT SERPL: 23.9 (ref 7–25)
CALCIUM SPEC-SCNC: 10.1 MG/DL (ref 8.6–10.5)
CHLORIDE SERPL-SCNC: 105 MMOL/L (ref 98–107)
CHOLEST SERPL-MCNC: 159 MG/DL (ref 0–200)
CO2 SERPL-SCNC: 23.6 MMOL/L (ref 22–29)
CREAT SERPL-MCNC: 0.71 MG/DL (ref 0.57–1)
DEPRECATED RDW RBC AUTO: 41.2 FL (ref 37–54)
EGFRCR SERPLBLD CKD-EPI 2021: 98.7 ML/MIN/1.73
EOSINOPHIL # BLD AUTO: 0.14 10*3/MM3 (ref 0–0.4)
EOSINOPHIL NFR BLD AUTO: 1.8 % (ref 0.3–6.2)
ERYTHROCYTE [DISTWIDTH] IN BLOOD BY AUTOMATED COUNT: 13 % (ref 12.3–15.4)
GLOBULIN UR ELPH-MCNC: 2.5 GM/DL
GLUCOSE SERPL-MCNC: 95 MG/DL (ref 65–99)
HCT VFR BLD AUTO: 40.8 % (ref 34–46.6)
HCV AB SER DONR QL: NORMAL
HDLC SERPL-MCNC: 37 MG/DL (ref 40–60)
HGB BLD-MCNC: 13.9 G/DL (ref 12–15.9)
IMM GRANULOCYTES # BLD AUTO: 0.01 10*3/MM3 (ref 0–0.05)
IMM GRANULOCYTES NFR BLD AUTO: 0.1 % (ref 0–0.5)
LDLC SERPL CALC-MCNC: 96 MG/DL (ref 0–100)
LDLC/HDLC SERPL: 2.51 {RATIO}
LYMPHOCYTES # BLD AUTO: 1.55 10*3/MM3 (ref 0.7–3.1)
LYMPHOCYTES NFR BLD AUTO: 20.2 % (ref 19.6–45.3)
MCH RBC QN AUTO: 29.6 PG (ref 26.6–33)
MCHC RBC AUTO-ENTMCNC: 34.1 G/DL (ref 31.5–35.7)
MCV RBC AUTO: 86.8 FL (ref 79–97)
MONOCYTES # BLD AUTO: 0.77 10*3/MM3 (ref 0.1–0.9)
MONOCYTES NFR BLD AUTO: 10.1 % (ref 5–12)
NEUTROPHILS NFR BLD AUTO: 5.15 10*3/MM3 (ref 1.7–7)
NEUTROPHILS NFR BLD AUTO: 67.3 % (ref 42.7–76)
NRBC BLD AUTO-RTO: 0 /100 WBC (ref 0–0.2)
PLATELET # BLD AUTO: 249 10*3/MM3 (ref 140–450)
PMV BLD AUTO: 11.8 FL (ref 6–12)
POTASSIUM SERPL-SCNC: 4 MMOL/L (ref 3.5–5.2)
PROT SERPL-MCNC: 6.5 G/DL (ref 6–8.5)
RBC # BLD AUTO: 4.7 10*6/MM3 (ref 3.77–5.28)
SODIUM SERPL-SCNC: 139 MMOL/L (ref 136–145)
TRIGL SERPL-MCNC: 146 MG/DL (ref 0–150)
TSH SERPL DL<=0.05 MIU/L-ACNC: 1.18 UIU/ML (ref 0.27–4.2)
VLDLC SERPL-MCNC: 26 MG/DL (ref 5–40)
WBC NRBC COR # BLD: 7.66 10*3/MM3 (ref 3.4–10.8)

## 2022-07-25 PROCEDURE — 36415 COLL VENOUS BLD VENIPUNCTURE: CPT

## 2022-07-25 PROCEDURE — 80061 LIPID PANEL: CPT

## 2022-07-25 PROCEDURE — 80050 GENERAL HEALTH PANEL: CPT

## 2022-07-25 PROCEDURE — 86803 HEPATITIS C AB TEST: CPT

## 2022-07-27 ENCOUNTER — TELEPHONE (OUTPATIENT)
Dept: FAMILY MEDICINE CLINIC | Facility: CLINIC | Age: 57
End: 2022-07-27

## 2022-07-27 NOTE — TELEPHONE ENCOUNTER
----- Message from AYAN Santiago sent at 7/26/2022  4:07 PM EDT -----  Labs look great other than low HDL, recommend increase cardiovascular exercise

## 2022-08-31 ENCOUNTER — HOSPITAL ENCOUNTER (OUTPATIENT)
Dept: MAMMOGRAPHY | Facility: HOSPITAL | Age: 57
Discharge: HOME | End: 2022-08-31
Admitting: NURSE PRACTITIONER

## 2022-08-31 DIAGNOSIS — Z12.31 BREAST CANCER SCREENING BY MAMMOGRAM: ICD-10-CM

## 2022-08-31 PROCEDURE — 77067 SCR MAMMO BI INCL CAD: CPT

## 2022-08-31 PROCEDURE — 77063 BREAST TOMOSYNTHESIS BI: CPT

## 2022-09-14 ENCOUNTER — OFFICE VISIT (OUTPATIENT)
Dept: SLEEP MEDICINE | Facility: HOSPITAL | Age: 57
End: 2022-09-14

## 2022-09-14 VITALS
OXYGEN SATURATION: 97 % | WEIGHT: 218.3 LBS | BODY MASS INDEX: 35.08 KG/M2 | HEART RATE: 69 BPM | SYSTOLIC BLOOD PRESSURE: 118 MMHG | DIASTOLIC BLOOD PRESSURE: 76 MMHG | HEIGHT: 66 IN

## 2022-09-14 DIAGNOSIS — E66.9 CLASS 2 OBESITY: ICD-10-CM

## 2022-09-14 DIAGNOSIS — G47.8 NON-RESTORATIVE SLEEP: ICD-10-CM

## 2022-09-14 DIAGNOSIS — E66.01 CLASS 2 SEVERE OBESITY WITH SERIOUS COMORBIDITY AND BODY MASS INDEX (BMI) OF 36.0 TO 36.9 IN ADULT, UNSPECIFIED OBESITY TYPE: ICD-10-CM

## 2022-09-14 DIAGNOSIS — G47.9 SLEEP DISTURBANCE: ICD-10-CM

## 2022-09-14 DIAGNOSIS — I10 PRIMARY HYPERTENSION: ICD-10-CM

## 2022-09-14 DIAGNOSIS — G47.30 OBSERVED SLEEP APNEA: Primary | ICD-10-CM

## 2022-09-14 DIAGNOSIS — R06.83 SNORING: ICD-10-CM

## 2022-09-14 DIAGNOSIS — G47.10 HYPERSOMNIA: ICD-10-CM

## 2022-09-14 DIAGNOSIS — R40.0 HAS DAYTIME DROWSINESS: ICD-10-CM

## 2022-09-14 PROBLEM — E66.812 CLASS 2 OBESITY: Status: ACTIVE | Noted: 2022-09-14

## 2022-09-14 PROCEDURE — 99204 OFFICE O/P NEW MOD 45 MIN: CPT | Performed by: INTERNAL MEDICINE

## 2022-09-14 PROCEDURE — G0463 HOSPITAL OUTPT CLINIC VISIT: HCPCS | Performed by: INTERNAL MEDICINE

## 2022-09-14 NOTE — PROGRESS NOTES
[address] [address]  [place]   [place] [address]  Phone: [phone]  Fax: [phone]      Jewels Beltrán  [id number]   1965  56 y.o.  female      Referring physician/provider and PCP Ashly Andrew APRN    Type of service: Initial Sleep Medicine Consult.  Date of service: 9/14/2022      Chief Complaint   Patient presents with   • Snoring   • Fatigue   • Non-restorative Sleep   • Dry Mouth   • Daytime Sleepiness   • Obesity       History of present illness;  Thank you for asking to see Jewels Beltrán, 56 y.o.. The patient was seen today on 9/14/2022 at [facility].  The patient presents today with symptoms of snoring, non-restorative sleep and witnessed apneas. The symptoms are present for 1 to 2 years and they are persistent in nature.  The snoring is present in all positions and it is loud.  Has no history of prior sleep evaluation and sleep studies. Patient has no prior surgery namely tonsillectomy, nasal surgery and UPPP.  She works in retail.    Patient gives the following sleep history.  Sleep schedule:  Bedtime: 10:30 PM  Wake time: 6 AM  Normally takes about 30 minutes to fall asleep  Average hours of sleep 7  Number of naps per day none  Symptoms   In addition to snoring, nonrestorative sleep and witnessed apneas patient gives the following associated symptoms.  Have you ever awakened gasping for breath, coughing, choking: Yes   Change in weight,  Yes gained 20 pounds  Morning headaches  Yes   Awaken with a sore throat or dry mouth  No   Leg jerking at night:  No   Crawly feeling/urge sensation to move in the legs: No   Teeth grinding:No   Have you ever awakened at night with a sour taste or burning sensation in your chest:  No   Do you have muscle weakness with laughing or anger or sleep paralysis:  No   Have you ever felt paralyzed while going to sleep or waking up:  No   Sleepwalking, nightmares, No   Nocturia (urination at night): 2  "times per night  Memory Problem:No     MEDICAL CONDITIONS (PMH)  1. Hypertension    Social history:  Do you drive a commercial vehicle:  No   Shift work:  No   Tobacco use:  Yes   Alcohol use: 2 per week  Caffeinated drinks: 3    Family Hx (your parents and siblings)  1. No history of sleep apnea    Medications: reviewed    Review of systems:  Sleep: Positve for snoring,witnessed apnea and daytime excessive sleepiness with [place] Sleepiness Scale of Total score: 9   Kidney/ Bladder  Difficulty In Urination: negative  Bed Wetting: negative  Frequent Urination: negative  HEENT  Recurrent Nose Bleeds: negative  Ear pain: negative  Sores In Mouth: negative  Persistent Hoarseness: negative  Nasal Congestion: negative  Post Nasal Drip: negative  Musculoskeletal:  Neck Pain: negative  Temporomandibular Joint Pain: negative  General:  Fever: negative  Fatigue: positive  Cardiovascular:  Irregular Heartbeat: negative  Swollen Ankles Or Legs: negative  Respiratory:  Shortness Of Breath: negative  Wheezing: negative  Neuro/Paych:  Fainting Spells: negative  Dizziness: negative  Anxiety: negative  Depression: negative  Gastrointestinal:  Problem Swallowing: negative  Frequent Heartburn: negative  Abdominal Bloating: negative  Skin:  Rash: negative  Change In Nails: negative  Endocrine:  Excessive Thirst: negative  Always Too Cold: negative  Always Too Warm: negative  Hem/Lymphatic:  Swollen Glands: negative  Burses/ Bleeds Easily: negative    Physical exam:  CONSTITUTINONAL:  Vitals:    09/14/22 0700   BP: 118/76   Pulse: 69   SpO2: 97%   Weight: 99 kg (218 lb 4.8 oz)   Height: 167.6 cm (66\")    Body mass index is 35.23 kg/m².   HEAD: atraumatic, normocephalic   EYES:pupils are round, equal and reacting to light and accommodation, conjunctiva normal  NOSE:no nasal septal defects, nasal passages are clear, no nasal polyps,   THROAT: tonsils are grade 1, tongue normal size, oral airway Mallampati class 3  NECK:Neck " Circumference: 15.5 inches, trachea is in the midline, thyroid not enlarged  RESPIRATORY SYSTEM: Breath sounds are normal, there are no wheezes  CARDIOVASULAR SYSTEM: Heart sounds are regular rhythm and normal rate, there are no murmurs or thrills, no edema  GASTROINTESTINAL: Soft and non-tender,liver not enlarged, no evidence of ascites  MUSCULOSKELETAL SYSTEM: Full range of motion's in all the 4 extremities, neck movement not restricted, temporomandibular joint movement normal and no tenderness  SKIN: Warm and moist, no cyanosis, no clubbing,  NEUROLOGICAL SYSTEM: Oriented x 3, no gross neurological defects, No speech defect, gait is normal  PSYCHIATRIC SYSTEM: Mood is normal, thought content is normal    Office notes from care team reviewed. Office note dated June 29, 2022,reviewed  Labs reviewed.  TSH Results:  TSH    TSH 2/2/22 7/25/22   TSH 0.951 1.180                Assessment and plan:  · Witnessed apneas,(R06.81) patient's symptoms and examination is consistent with sleep apnea (G47.30). I have talked to the patient about the signs and symptoms of sleep apnea. In addition, I have also discussed pathophysiology of sleep apnea.  I also discussed the complications of untreated sleep apnea including effects on hypertension, diabetes mellitus and nonrestorative sleep with hypersomnia which can increase risk for motor vehicle accidents.  Untreated sleep apnea is also a risk factor for development of atrial fibrillation, pulmonary hypertension and stroke.  Discussed in detail of various testing methods including home-based and lab based sleep studies.  Based on history and physical examination and other comorbidities the most appropriate study is home sleep test.  The order for the sleep study is placed in [facility].  The test will be scheduled after approval from insurance. Treatment and management will be discussed after the test is completed.  Patient was given opportunity to ask questions and all the questions were  answered.   · Snoring (R06.83), snoring is the sound created by turbulent airflow vibrating upper airway soft tissue due to limitation of inspiratory airflow. I have also discussed factors affecting snoring including sleep deprivation, sleeping on the back and alcohol ingestion. To minimize snoring, patient is advised to have adequate sleep, sleep on the side and avoid alcohol and sedative medications before bedtime  · Daytime excessive sleepiness .  It was assessed with [place] Sleepiness Scale of Total score: 9.  There are many causes for daytime excessive sleepiness including sleep depression, shiftwork syndrome, depression and other medical disorders including heart, kidney and liver failure.  The most serious cause of excessive sleepiness is due to neurological conditions like narcolepsy/cataplexy.  But the most common cause of excessive sleepiness is due to sleep apnea with frequent awakenings during sleep time.  I have discussed safety of driving and to remain vigilant while driving.  · Obesity 2, patient's BMI is Body mass index is 35.23 kg/m².. I have discussed the relationship between weight and sleep apnea.There is direct correlation between weight and severity of sleep apnea.  Weight reduction is encouraged, as it is going to reduce the severity of sleep apnea. I have also discussed with the patient diet and exercise to achieve ideal body weight.    I have also discussed with the patient the following  • Sleep hygiene: Maintaining a regular bedtime and wake time, not to watch television or work in bed, limit caffeine-containing beverages before bed time and avoid naps during the day  • Adequate amount of sleep.  Generally most people needs about 7 to 8 hours of sleep.    Return for 31 to 90 days after PAP setup with down load..  Patient's questions were answered      I once again thank you for asking me to see this patient in consultation and I have forwarded my opinion and treatment plan.  Please do not  hesitate to call me if you have any questions.     Cheryl Minor MD  Sleep Medicine  Medical Director, [facility]  9/14/2022 ,

## 2022-10-19 ENCOUNTER — HOSPITAL ENCOUNTER (OUTPATIENT)
Dept: SLEEP MEDICINE | Facility: HOSPITAL | Age: 57
Discharge: HOME | End: 2022-10-19
Admitting: INTERNAL MEDICINE

## 2022-10-19 DIAGNOSIS — G47.10 HYPERSOMNIA: ICD-10-CM

## 2022-10-19 DIAGNOSIS — R40.0 HAS DAYTIME DROWSINESS: ICD-10-CM

## 2022-10-19 DIAGNOSIS — R06.83 SNORING: ICD-10-CM

## 2022-10-19 DIAGNOSIS — E66.9 CLASS 2 OBESITY: ICD-10-CM

## 2022-10-19 DIAGNOSIS — G47.30 OBSERVED SLEEP APNEA: ICD-10-CM

## 2022-10-19 DIAGNOSIS — G47.9 SLEEP DISTURBANCE: ICD-10-CM

## 2022-10-19 DIAGNOSIS — G47.8 NON-RESTORATIVE SLEEP: ICD-10-CM

## 2022-10-19 PROCEDURE — 95806 SLEEP STUDY UNATT&RESP EFFT: CPT

## 2022-10-19 PROCEDURE — 95806 SLEEP STUDY UNATT&RESP EFFT: CPT | Performed by: INTERNAL MEDICINE

## 2022-12-22 DIAGNOSIS — I10 PRIMARY HYPERTENSION: ICD-10-CM

## 2022-12-22 RX ORDER — HYDROCHLOROTHIAZIDE 12.5 MG/1
12.5 TABLET ORAL DAILY
Qty: 90 TABLET | Refills: 1 | Status: SHIPPED | OUTPATIENT
Start: 2022-12-22

## 2022-12-22 RX ORDER — LISINOPRIL 40 MG/1
40 TABLET ORAL DAILY
Qty: 90 TABLET | Refills: 1 | Status: SHIPPED | OUTPATIENT
Start: 2022-12-22

## 2022-12-28 ENCOUNTER — OFFICE VISIT (OUTPATIENT)
Dept: FAMILY MEDICINE CLINIC | Facility: CLINIC | Age: 57
End: 2022-12-28

## 2022-12-28 VITALS
HEIGHT: 66 IN | WEIGHT: 218 LBS | OXYGEN SATURATION: 97 % | BODY MASS INDEX: 35.03 KG/M2 | HEART RATE: 87 BPM | SYSTOLIC BLOOD PRESSURE: 118 MMHG | DIASTOLIC BLOOD PRESSURE: 78 MMHG

## 2022-12-28 DIAGNOSIS — Z13.29 SCREENING FOR THYROID DISORDER: ICD-10-CM

## 2022-12-28 DIAGNOSIS — I10 PRIMARY HYPERTENSION: Primary | ICD-10-CM

## 2022-12-28 DIAGNOSIS — R05.1 ACUTE COUGH: ICD-10-CM

## 2022-12-28 DIAGNOSIS — J06.9 UPPER RESPIRATORY TRACT INFECTION, UNSPECIFIED TYPE: ICD-10-CM

## 2022-12-28 LAB
EXPIRATION DATE: NORMAL
FLUAV AG UPPER RESP QL IA.RAPID: NOT DETECTED
FLUBV AG UPPER RESP QL IA.RAPID: NOT DETECTED
INTERNAL CONTROL: NORMAL
Lab: NORMAL
SARS-COV-2 AG UPPER RESP QL IA.RAPID: NOT DETECTED

## 2022-12-28 PROCEDURE — 87428 SARSCOV & INF VIR A&B AG IA: CPT | Performed by: NURSE PRACTITIONER

## 2022-12-28 PROCEDURE — 99214 OFFICE O/P EST MOD 30 MIN: CPT | Performed by: NURSE PRACTITIONER

## 2022-12-28 NOTE — PROGRESS NOTES
"Chief Complaint  Hypertension, Cough, Chills, Fever, and Nasal Congestion    SUBJECTIVE  Jewels Beltrán presents to [facility] 6 month f/u    Pt here for 6 month follow up    Pt states that she has had a cough, chills, congestion and nasal drainage for 2 days.    Mammo: 8/2022  Colonoscopy: 1/23/2018 due in 5 years.  Labs due    History of Present Illness  Past Medical History:   Diagnosis Date   • Anxiety    • Arthritis    • Hypertension       Family History   Problem Relation Age of Onset   • Diabetes Mother    • Cancer Mother    • Arthritis Mother    • Heart disease Father       Past Surgical History:   Procedure Laterality Date   • COLONOSCOPY     • LAPAROSCOPIC TUBAL LIGATION          Current Outpatient Medications:   •  aspirin 325 MG tablet, Take 325 mg by mouth Daily. prn, Disp: , Rfl:   •  hydroCHLOROthiazide (HYDRODIURIL) 12.5 MG tablet, TAKE 1 TABLET BY MOUTH DAILY, Disp: 90 tablet, Rfl: 1  •  lisinopril (PRINIVIL,ZESTRIL) 40 MG tablet, TAKE 1 TABLET BY MOUTH DAILY, Disp: 90 tablet, Rfl: 1    OBJECTIVE  Vital Signs:   /78   Pulse 87   Ht 167.6 cm (66\")   Wt 98.9 kg (218 lb)   LMP 10/08/2016 (Approximate) Comment: 2017  SpO2 97%   BMI 35.19 kg/m²    Estimated body mass index is 35.19 kg/m² as calculated from the following:    Height as of this encounter: 167.6 cm (66\").    Weight as of this encounter: 98.9 kg (218 lb).     Wt Readings from Last 3 Encounters:   12/28/22 98.9 kg (218 lb)   09/14/22 99 kg (218 lb 4.8 oz)   06/29/22 104 kg (229 lb)     BP Readings from Last 3 Encounters:   12/28/22 118/78   09/14/22 118/76   06/29/22 126/78       Physical Exam  Vitals reviewed.   Constitutional:       Appearance: Normal appearance. She is well-developed.   HENT:      Head: Normocephalic and atraumatic.      Right Ear: External ear normal.      Left Ear: External ear normal.   Eyes:      Conjunctiva/sclera: Conjunctivae normal.      Pupils: Pupils are equal, " round, and reactive to light.   Cardiovascular:      Rate and Rhythm: Normal rate and regular rhythm.      Heart sounds: No murmur heard.    No friction rub. No gallop.   Pulmonary:      Effort: Pulmonary effort is normal.      Breath sounds: Normal breath sounds. No wheezing or rhonchi.   Skin:     General: Skin is warm and dry.   Neurological:      Mental Status: She is alert and oriented to person, place, and time.      Cranial Nerves: No cranial nerve deficit.   Psychiatric:         Mood and Affect: Mood and affect normal.         Behavior: Behavior normal.         Thought Content: Thought content normal.         Judgment: Judgment normal.          Result Review    CMP    CMP 2/2/22 2/2/22 7/25/22    1638 1638    Glucose 89  95   BUN 19  17   Creatinine 0.94  0.71   eGFR Non  Am 61     eGFR   98.7   Sodium 141  139   Potassium 3.8  4.0   Chloride 104  105   Calcium 10.4  10.1   Total Protein  7.0    Total Protein 7.1  6.5   Albumin 4.30 3.8 4.00   Globulin  3.2    Globulin 2.8  2.5   Total Bilirubin 0.5  0.4   Alkaline Phosphatase 80  67   AST (SGOT) 18  22   ALT (SGPT) 25  20   Albumin/Globulin Ratio 1.5  1.6   BUN/Creatinine Ratio 20.2  23.9   Anion Gap 9.6  10.4      Comments are available for some flowsheets but are not being displayed.           CBC    CBC 2/2/22 7/25/22   WBC 8.08 7.66   RBC 4.87 4.70   Hemoglobin 14.2 13.9   Hematocrit 44.2 40.8   MCV 90.8 86.8   MCH 29.2 29.6   MCHC 32.1 34.1   RDW 13.0 13.0   Platelets 298 249           Lipid Panel    Lipid Panel 7/25/22   Total Cholesterol 159   Triglycerides 146   HDL Cholesterol 37 (A)   VLDL Cholesterol 26   LDL Cholesterol  96   LDL/HDL Ratio 2.51   (A) Abnormal value            TSH    TSH 2/2/22 7/25/22   TSH 0.951 1.180           POCT SARS-CoV-2 Antigen SILAS + Flu  Order: [id number]   Status: Final result      Visible to patient: No (scheduled for 12/28/2022 11:24 PM)      Next appt: 02/15/2023 at 09:30 AM in Sleep Medicine (Cheryl MOREJON  MD Mily)      Dx: Acute cough     Specimen Information: Swab    0 Result Notes      Component  Ref Range & Units 09:24   SARS Antigen  Not Detected, Presumptive Negative Not Detected    Influenza A Antigen SILAS  Not Detected Not Detected    Influenza B Antigen SILAS  Not Detected Not Detected    Internal Control  Passed Passed    Lot Number [id number]    Expiration Date 03/10/2023               Specimen Collected: 12/28/22 09:24 EST Last Resulted: 12/28/22 09:24 EST                 Mammo Screening Digital Tomosynthesis Bilateral With CAD    Result Date: 8/31/2022   Benign mammogram. Suggest routine mammographic screening.  RECOMMENDATION(S):  ROUTINE MAMMOGRAM AND CLINICAL EVALUATION IN 12 MONTHS.   BIRADS:  DIAGNOSTIC CATEGORY 2--BENIGN FINDING   BREAST COMPOSITION: Scattered areas fibroglandular density.  PLEASE NOTE:  A NORMAL MAMMOGRAM DOES NOT EXCLUDE THE POSSIBILITY OF BREAST CANCER. ANY CLINICALLY SUSPICIOUS PALPABLE LUMP SHOULD BE BIOPSIED.      RADHA CLOUD MD       Electronically Signed and Approved By: RADHA CLOUD MD on 8/31/2022 at 8:45                The above data has been reviewed by AYAN Garzon 12/28/2022 08:48 EST.          Patient Care Team:  Ashly Andrew APRN as PCP - General (Nurse Practitioner)           ASSESSMENT & PLAN    Diagnoses and all orders for this visit:    1. Primary hypertension (Primary)  Assessment & Plan:  Hypertension is well controlled at present, continue current dose of medication.    Orders:  -     CBC w AUTO Differential; Future  -     Comprehensive metabolic panel; Future  -     Lipid panel; Future    2. Acute cough  -     POCT SARS-CoV-2 Antigen SILAS + Flu    3. Screening for thyroid disorder  -     TSH; Future    4. Upper respiratory tract infection, unspecified type  Comments:  Over-the-counter cough and cold medicine as directed.  Orders:  -     POCT SARS-CoV-2 Antigen SILAS + Flu       Tobacco Use: High Risk   • Smoking Tobacco Use: Every  Day   • Smokeless Tobacco Use: Never   • Passive Exposure: Not on file       Follow Up     Return in about 6 months (around 6/28/2023).        Patient was given instructions and counseling regarding her condition or for health maintenance advice. Please see specific information pulled into the AVS if appropriate.   I have reviewed information obtained and documented by others and I have confirmed the accuracy of this documented note.    AYAN Garzon

## 2023-01-10 ENCOUNTER — LAB (OUTPATIENT)
Dept: LAB | Facility: HOSPITAL | Age: 58
End: 2023-01-10
Payer: COMMERCIAL

## 2023-01-10 DIAGNOSIS — I10 PRIMARY HYPERTENSION: ICD-10-CM

## 2023-01-10 DIAGNOSIS — Z13.29 SCREENING FOR THYROID DISORDER: ICD-10-CM

## 2023-01-10 LAB
ALBUMIN SERPL-MCNC: 4.3 G/DL (ref 3.5–5.2)
ALBUMIN/GLOB SERPL: 1.8 G/DL
ALP SERPL-CCNC: 76 U/L (ref 39–117)
ALT SERPL W P-5'-P-CCNC: 21 U/L (ref 1–33)
ANION GAP SERPL CALCULATED.3IONS-SCNC: 8.1 MMOL/L (ref 5–15)
AST SERPL-CCNC: 17 U/L (ref 1–32)
BASOPHILS # BLD AUTO: 0.07 10*3/MM3 (ref 0–0.2)
BASOPHILS NFR BLD AUTO: 0.9 % (ref 0–1.5)
BILIRUB SERPL-MCNC: 0.5 MG/DL (ref 0–1.2)
BUN SERPL-MCNC: 17 MG/DL (ref 6–20)
BUN/CREAT SERPL: 21 (ref 7–25)
CALCIUM SPEC-SCNC: 10.2 MG/DL (ref 8.6–10.5)
CHLORIDE SERPL-SCNC: 106 MMOL/L (ref 98–107)
CHOLEST SERPL-MCNC: 182 MG/DL (ref 0–200)
CO2 SERPL-SCNC: 27.9 MMOL/L (ref 22–29)
CREAT SERPL-MCNC: 0.81 MG/DL (ref 0.57–1)
DEPRECATED RDW RBC AUTO: 41 FL (ref 37–54)
EGFRCR SERPLBLD CKD-EPI 2021: 83.7 ML/MIN/1.73
EOSINOPHIL # BLD AUTO: 0.15 10*3/MM3 (ref 0–0.4)
EOSINOPHIL NFR BLD AUTO: 2 % (ref 0.3–6.2)
ERYTHROCYTE [DISTWIDTH] IN BLOOD BY AUTOMATED COUNT: 12.6 % (ref 12.3–15.4)
GLOBULIN UR ELPH-MCNC: 2.4 GM/DL
GLUCOSE SERPL-MCNC: 93 MG/DL (ref 65–99)
HCT VFR BLD AUTO: 43.1 % (ref 34–46.6)
HDLC SERPL-MCNC: 42 MG/DL (ref 40–60)
HGB BLD-MCNC: 14 G/DL (ref 12–15.9)
IMM GRANULOCYTES # BLD AUTO: 0.02 10*3/MM3 (ref 0–0.05)
IMM GRANULOCYTES NFR BLD AUTO: 0.3 % (ref 0–0.5)
LDLC SERPL CALC-MCNC: 122 MG/DL (ref 0–100)
LDLC/HDLC SERPL: 2.86 {RATIO}
LYMPHOCYTES # BLD AUTO: 2 10*3/MM3 (ref 0.7–3.1)
LYMPHOCYTES NFR BLD AUTO: 26.1 % (ref 19.6–45.3)
MCH RBC QN AUTO: 29.2 PG (ref 26.6–33)
MCHC RBC AUTO-ENTMCNC: 32.5 G/DL (ref 31.5–35.7)
MCV RBC AUTO: 89.8 FL (ref 79–97)
MONOCYTES # BLD AUTO: 1.03 10*3/MM3 (ref 0.1–0.9)
MONOCYTES NFR BLD AUTO: 13.5 % (ref 5–12)
NEUTROPHILS NFR BLD AUTO: 4.38 10*3/MM3 (ref 1.7–7)
NEUTROPHILS NFR BLD AUTO: 57.2 % (ref 42.7–76)
NRBC BLD AUTO-RTO: 0 /100 WBC (ref 0–0.2)
PLATELET # BLD AUTO: 321 10*3/MM3 (ref 140–450)
PMV BLD AUTO: 12.1 FL (ref 6–12)
POTASSIUM SERPL-SCNC: 4.3 MMOL/L (ref 3.5–5.2)
PROT SERPL-MCNC: 6.7 G/DL (ref 6–8.5)
RBC # BLD AUTO: 4.8 10*6/MM3 (ref 3.77–5.28)
SODIUM SERPL-SCNC: 142 MMOL/L (ref 136–145)
TRIGL SERPL-MCNC: 100 MG/DL (ref 0–150)
TSH SERPL DL<=0.05 MIU/L-ACNC: 1.6 UIU/ML (ref 0.27–4.2)
VLDLC SERPL-MCNC: 18 MG/DL (ref 5–40)
WBC NRBC COR # BLD: 7.65 10*3/MM3 (ref 3.4–10.8)

## 2023-01-10 PROCEDURE — 80061 LIPID PANEL: CPT

## 2023-01-10 PROCEDURE — 80050 GENERAL HEALTH PANEL: CPT

## 2023-01-10 PROCEDURE — 36415 COLL VENOUS BLD VENIPUNCTURE: CPT

## 2023-01-11 PROCEDURE — U0004 COV-19 TEST NON-CDC HGH THRU: HCPCS | Performed by: NURSE PRACTITIONER

## 2023-01-12 ENCOUNTER — TELEPHONE (OUTPATIENT)
Dept: URGENT CARE | Facility: CLINIC | Age: 58
End: 2023-01-12
Payer: COMMERCIAL

## 2023-01-12 NOTE — TELEPHONE ENCOUNTER
Attempted to contact patient via phone at [phone].  Voicemail was left asking resupinate to return call if she has any further questions about the COVID results she has already seen on MyChart.

## 2023-02-15 ENCOUNTER — OFFICE VISIT (OUTPATIENT)
Dept: SLEEP MEDICINE | Facility: HOSPITAL | Age: 58
End: 2023-02-15
Payer: COMMERCIAL

## 2023-02-15 VITALS
HEART RATE: 75 BPM | OXYGEN SATURATION: 97 % | BODY MASS INDEX: 36.05 KG/M2 | HEIGHT: 66 IN | DIASTOLIC BLOOD PRESSURE: 81 MMHG | WEIGHT: 224.3 LBS | SYSTOLIC BLOOD PRESSURE: 125 MMHG

## 2023-02-15 DIAGNOSIS — E66.9 CLASS 2 OBESITY: ICD-10-CM

## 2023-02-15 DIAGNOSIS — G47.8 NON-RESTORATIVE SLEEP: ICD-10-CM

## 2023-02-15 DIAGNOSIS — R06.83 SNORING: Primary | ICD-10-CM

## 2023-02-15 PROBLEM — G47.30 OBSERVED SLEEP APNEA: Status: RESOLVED | Noted: 2022-09-14 | Resolved: 2023-02-15

## 2023-02-15 PROBLEM — G47.10 HYPERSOMNIA: Status: RESOLVED | Noted: 2022-09-14 | Resolved: 2023-02-15

## 2023-02-15 PROCEDURE — G0463 HOSPITAL OUTPT CLINIC VISIT: HCPCS

## 2023-02-15 PROCEDURE — 99212 OFFICE O/P EST SF 10 MIN: CPT | Performed by: INTERNAL MEDICINE

## 2023-02-15 NOTE — PROGRESS NOTES
"  [address] [address]  Phone: [phone]  Fax: [phone]      SLEEP CLINIC FOLLOW UP PROGRESS NOTE.    Jewels Beltrán  [id number]   1965  57 y.o.  female      PCP: Ashly Andrew APRN      Date of visit: 2/15/2023    Chief Complaint   Patient presents with   • Snoring   • Obesity       HPI:  This is a 57 y.o. years old patient who has a history of snoring and other symptoms of sleep apnea was evaluated recently and underwent sleep test.  Patient is here to discuss the test results and treatment options.  I have discussed with the patient the test results.  To summarize the test shows no evidence of sleep apnea.  The AHI(apnea-hypopnea index) is 5 /hr, less than 5.0 is normal.  But the test showed snoring for 40% of sleep time with 77 episodes.  No evidence of oxygen desaturation.     Medication and allergies are reviewed by me and documented in the encounter.     SOCIAL ( habits pertaining to sleep medicine)  History of smoking:Yes   History of alcohol use: 0 per week  Caffeine use: 3     REVIEW OF SYSTEMS:   [place] Sleepiness Scale :Total score: 14   Snoring: Yes   Nasal congestion:No       PHYSICAL EXAMINATION:  CONSTITUTIONAL:  Vitals:    02/15/23 0900   BP: 125/81   Pulse: 75   SpO2: 97%   Weight: 102 kg (224 lb 4.8 oz)   Height: 167.6 cm (65.98\")    Body mass index is 36.22 kg/m².  NOSE: nasal passages are clear, no nasal polyps, septum in the midline.  THROAT: throat is clear,       ASSESSMENT AND PLAN:  · Snoring R 06.83 /upper airway resistance syndrome G 47.39  · Patient is advised to lose weight, Body mass index is 36.22 kg/m².  · Advised to sleep on the side, patient can use body pillow to prevent rollover  · Sleep hygiene, with the regular sleep time and wake time  · Advised to avoid alcohol and other sedative medications  · Patient can also use oral appliance.  I have given information about the oral appliance.  · Obesity, " class 2 with BMI is Body mass index is 36.22 kg/m².. I have discuss the relationship between the weight and sleep apnea. The benefit of weight loss in reducing severity of sleep apnea was discussed. Discussed diet and exercise with the patient to achieve ideal BMI.  · Return if symptoms worsen or fail to improve. . Patient's questions were answered.        Cheryl Minor MD  Sleep Medicine  Medical Director for [facility]  2/15/2023

## 2023-09-14 DIAGNOSIS — I10 PRIMARY HYPERTENSION: ICD-10-CM

## 2023-09-14 RX ORDER — LISINOPRIL 40 MG/1
40 TABLET ORAL DAILY
Qty: 90 TABLET | Refills: 0 | Status: SHIPPED | OUTPATIENT
Start: 2023-09-14

## 2023-09-14 RX ORDER — HYDROCHLOROTHIAZIDE 12.5 MG/1
12.5 TABLET ORAL DAILY
Qty: 90 TABLET | Refills: 0 | Status: SHIPPED | OUTPATIENT
Start: 2023-09-14

## 2023-09-14 NOTE — TELEPHONE ENCOUNTER
I spoke to patient, she has requested the refill from her pharmacy.  Not due for refill until 12/28/23.

## 2024-01-10 ENCOUNTER — OFFICE VISIT (OUTPATIENT)
Dept: FAMILY MEDICINE CLINIC | Facility: CLINIC | Age: 59
End: 2024-01-10
Payer: COMMERCIAL

## 2024-01-10 VITALS
WEIGHT: 249.8 LBS | HEIGHT: 66 IN | SYSTOLIC BLOOD PRESSURE: 130 MMHG | DIASTOLIC BLOOD PRESSURE: 82 MMHG | OXYGEN SATURATION: 98 % | BODY MASS INDEX: 40.15 KG/M2 | HEART RATE: 73 BPM

## 2024-01-10 DIAGNOSIS — E66.01 CLASS 3 SEVERE OBESITY WITH SERIOUS COMORBIDITY AND BODY MASS INDEX (BMI) OF 40.0 TO 44.9 IN ADULT, UNSPECIFIED OBESITY TYPE: ICD-10-CM

## 2024-01-10 DIAGNOSIS — Z13.29 THYROID DISORDER SCREEN: ICD-10-CM

## 2024-01-10 DIAGNOSIS — Z23 NEED FOR INFLUENZA VACCINATION: ICD-10-CM

## 2024-01-10 DIAGNOSIS — F17.200 SMOKER: ICD-10-CM

## 2024-01-10 DIAGNOSIS — I10 PRIMARY HYPERTENSION: Primary | ICD-10-CM

## 2024-01-10 PROBLEM — E66.9 CLASS 2 OBESITY: Status: RESOLVED | Noted: 2022-09-14 | Resolved: 2024-01-10

## 2024-01-10 PROBLEM — E66.812 CLASS 2 OBESITY: Status: RESOLVED | Noted: 2022-09-14 | Resolved: 2024-01-10

## 2024-01-10 PROBLEM — E66.813 CLASS 3 SEVERE OBESITY WITH SERIOUS COMORBIDITY AND BODY MASS INDEX (BMI) OF 40.0 TO 44.9 IN ADULT: Status: ACTIVE | Noted: 2024-01-10

## 2024-01-10 RX ORDER — LISINOPRIL 40 MG/1
40 TABLET ORAL DAILY
Qty: 90 TABLET | Refills: 1 | Status: SHIPPED | OUTPATIENT
Start: 2024-01-10

## 2024-01-10 RX ORDER — MELOXICAM 15 MG/1
TABLET ORAL
COMMUNITY

## 2024-01-10 RX ORDER — HYDROCHLOROTHIAZIDE 12.5 MG/1
12.5 TABLET ORAL DAILY
Qty: 90 TABLET | Refills: 1 | Status: SHIPPED | OUTPATIENT
Start: 2024-01-10

## 2024-01-10 NOTE — ASSESSMENT & PLAN NOTE
Patient's (Body mass index is 40.34 kg/m².) indicates that they are morbidly/severely obese (BMI > 40 or > 35 with obesity - related health condition) with health conditions that include hypertension . Weight is worsening. BMI  is above average; BMI management plan is completed. We discussed portion control and increasing exercise.

## 2024-01-10 NOTE — PROGRESS NOTES
"Chief Complaint  Hypertension    SUBJECTIVE  Jewels Beltrán presents to [facility] for 6 month follow up on htn    Is scheduled for colonoscopy consult this month.     Pt declines pneumonia vaccine at present     History of Present Illness  Past Medical History:   Diagnosis Date    Anxiety     Arthritis     Hypertension       Family History   Problem Relation Age of Onset    Diabetes Mother     Cancer Mother     Arthritis Mother     Heart disease Father       Past Surgical History:   Procedure Laterality Date    COLONOSCOPY      LAPAROSCOPIC TUBAL LIGATION          Current Outpatient Medications:     aspirin 325 MG tablet, Take 1 tablet by mouth Daily. prn, Disp: , Rfl:     hydroCHLOROthiazide (HYDRODIURIL) 12.5 MG tablet, Take 1 tablet by mouth Daily., Disp: 90 tablet, Rfl: 1    lisinopril (PRINIVIL,ZESTRIL) 40 MG tablet, Take 1 tablet by mouth Daily., Disp: 90 tablet, Rfl: 1    meloxicam (MOBIC) 15 MG tablet, , Disp: , Rfl:     OBJECTIVE  Vital Signs:   /82   Pulse 73   Ht 167.6 cm (65.98\")   Wt 113 kg (249 lb 12.8 oz)   LMP 10/08/2016 (Approximate) Comment: 2017  SpO2 98%   BMI 40.34 kg/m²    Estimated body mass index is 40.34 kg/m² as calculated from the following:    Height as of this encounter: 167.6 cm (65.98\").    Weight as of this encounter: 113 kg (249 lb 12.8 oz).     Wt Readings from Last 3 Encounters:   01/10/24 113 kg (249 lb 12.8 oz)   06/28/23 106 kg (234 lb)   02/15/23 102 kg (224 lb 4.8 oz)     BP Readings from Last 3 Encounters:   01/10/24 130/82   06/28/23 128/80   02/15/23 125/81       Physical Exam  Vitals reviewed.   Constitutional:       Appearance: Normal appearance. She is well-developed.   HENT:      Head: Normocephalic and atraumatic.      Right Ear: External ear normal.      Left Ear: External ear normal.   Eyes:      Conjunctiva/sclera: Conjunctivae normal.      Pupils: Pupils are equal, round, and reactive to light.   Cardiovascular:     "  Rate and Rhythm: Normal rate and regular rhythm.      Heart sounds: No murmur heard.     No friction rub. No gallop.   Pulmonary:      Effort: Pulmonary effort is normal.      Breath sounds: Normal breath sounds. No wheezing or rhonchi.   Skin:     General: Skin is warm and dry.   Neurological:      Mental Status: She is alert and oriented to person, place, and time.      Cranial Nerves: No cranial nerve deficit.   Psychiatric:         Mood and Affect: Mood and affect normal.         Behavior: Behavior normal.         Thought Content: Thought content normal.         Judgment: Judgment normal.          Result Review    CMP          7/10/2023    06:44   CMP   Glucose 95    BUN 20    Creatinine 0.84    EGFR 80.2    Sodium 141    Potassium 3.9    Chloride 106    Calcium 10.5    Total Protein 6.9    Albumin 4.3    Globulin 2.6    Total Bilirubin 0.4    Alkaline Phosphatase 78    AST (SGOT) 18    ALT (SGPT) 23    Albumin/Globulin Ratio 1.7    BUN/Creatinine Ratio 23.8    Anion Gap 10.1      CBC          7/10/2023    06:44   CBC   WBC 6.96    RBC 4.70    Hemoglobin 14.2    Hematocrit 42.0    MCV 89.4    MCH 30.2    MCHC 33.8    RDW 13.0    Platelets 254      Lipid Panel          7/10/2023    06:44   Lipid Panel   Total Cholesterol 192    Triglycerides 80    HDL Cholesterol 50    VLDL Cholesterol 15    LDL Cholesterol  127    LDL/HDL Ratio 2.52      TSH          7/10/2023    06:44   TSH   TSH 1.720        Lab Results   Component Value Date    DYQB56WN 34.3 02/02/2022        Lab Results   Component Value Date    FREET4 1.1 05/20/2021     Lab Results   Component Value Date    EZOBGHKA92 443 02/02/2022       No Images in the past 120 days found..     The above data has been reviewed by AYAN Garzon 01/10/2024 08:40 EST.          Patient Care Team:  Ashly Andrew APRN as PCP - General (Nurse Practitioner)            ASSESSMENT & PLAN    Diagnoses and all orders for this visit:    1. Primary hypertension  (Primary)  Overview:  Hypertension is stable, continue current dose of medication    Orders:  -     Comprehensive Metabolic Panel; Future  -     CBC & Differential; Future  -     Lipid Panel; Future  -     lisinopril (PRINIVIL,ZESTRIL) 40 MG tablet; Take 1 tablet by mouth Daily.  Dispense: 90 tablet; Refill: 1  -     hydroCHLOROthiazide (HYDRODIURIL) 12.5 MG tablet; Take 1 tablet by mouth Daily.  Dispense: 90 tablet; Refill: 1    2. Need for influenza vaccination  -     Fluzone >6 Months ([id number])    3. Smoker  Assessment & Plan:  Will smoking, not motivated to quit at present      4. Thyroid disorder screen  -     TSH Rfx On Abnormal To Free T4; Future    5. Class 3 severe obesity with serious comorbidity and body mass index (BMI) of 40.0 to 44.9 in adult, unspecified obesity type  Assessment & Plan:  Patient's (Body mass index is 40.34 kg/m².) indicates that they are morbidly/severely obese (BMI > 40 or > 35 with obesity - related health condition) with health conditions that include hypertension . Weight is worsening. BMI  is above average; BMI management plan is completed. We discussed portion control and increasing exercise.            Tobacco Use: High Risk (1/10/2024)    Patient History     Smoking Tobacco Use: Every Day     Smokeless Tobacco Use: Never     Passive Exposure: Current       Follow Up     Return if symptoms worsen or fail to improve.        Patient was given instructions and counseling regarding her condition or for health maintenance advice. Please see specific information pulled into the AVS if appropriate.   I have reviewed information obtained and documented by others and I have confirmed the accuracy of this documented note.    AYAN Garzon

## 2024-01-31 ENCOUNTER — OFFICE VISIT (OUTPATIENT)
Dept: SURGERY | Facility: CLINIC | Age: 59
End: 2024-01-31
Payer: COMMERCIAL

## 2024-01-31 ENCOUNTER — PREP FOR SURGERY (OUTPATIENT)
Dept: OTHER | Facility: HOSPITAL | Age: 59
End: 2024-01-31
Payer: COMMERCIAL

## 2024-01-31 VITALS
HEART RATE: 72 BPM | WEIGHT: 251 LBS | SYSTOLIC BLOOD PRESSURE: 152 MMHG | HEIGHT: 65 IN | BODY MASS INDEX: 41.82 KG/M2 | DIASTOLIC BLOOD PRESSURE: 91 MMHG

## 2024-01-31 DIAGNOSIS — Z86.010 HISTORY OF COLONIC POLYPS: ICD-10-CM

## 2024-01-31 DIAGNOSIS — Z12.11 SCREENING FOR MALIGNANT NEOPLASM OF COLON: Primary | ICD-10-CM

## 2024-01-31 PROBLEM — Z86.0100 HISTORY OF COLONIC POLYPS: Status: ACTIVE | Noted: 2024-01-31

## 2024-01-31 RX ORDER — POLYETHYLENE GLYCOL 3350 17 G/17G
POWDER, FOR SOLUTION ORAL
Qty: 238 PACKET | Refills: 0 | Status: SHIPPED | OUTPATIENT
Start: 2024-01-31

## 2024-01-31 RX ORDER — SODIUM CHLORIDE 9 MG/ML
40 INJECTION, SOLUTION INTRAVENOUS AS NEEDED
OUTPATIENT
Start: 2024-01-31

## 2024-01-31 RX ORDER — SODIUM CHLORIDE 0.9 % (FLUSH) 0.9 %
3 SYRINGE (ML) INJECTION EVERY 12 HOURS SCHEDULED
OUTPATIENT
Start: 2024-01-31

## 2024-01-31 RX ORDER — SODIUM CHLORIDE 0.9 % (FLUSH) 0.9 %
10 SYRINGE (ML) INJECTION AS NEEDED
OUTPATIENT
Start: 2024-01-31

## 2024-01-31 NOTE — PROGRESS NOTES
Chief Complaint: Colonoscopy (consult)    Subjective     Colonoscopy consultation        History of Present Illness  Jewels Beltrán is a 58 y.o. female presents to [facility] for colonoscopy consultation.    Patient presents today on referral from Dr. Darek Cárdenas.    Patient presents today without complaints for screening colonoscopy.  She denies any abdominal pain, change in bowel, or rectal bleeding.  Denies any family history of colorectal cancer.  Admits to history of colonic polyps.    1/18: Colonoscopy (Melia):  normal colon.     12/14: Colonoscopy (Figert): Sigmoid - polyps removed. (No pathology).      Objective     Past Medical History:   Diagnosis Date    Anxiety     Arthritis     Hypertension     Screening for malignant neoplasm of colon 1/31/2024       Past Surgical History:   Procedure Laterality Date    COLONOSCOPY      LAPAROSCOPIC TUBAL LIGATION         Outpatient Medications Marked as Taking for the 1/31/24 encounter (Office Visit) with Marquis April, APRN   Medication Sig Dispense Refill    aspirin 325 MG tablet Take 1 tablet by mouth Daily. prn      hydroCHLOROthiazide (HYDRODIURIL) 12.5 MG tablet Take 1 tablet by mouth Daily. 90 tablet 1    lisinopril (PRINIVIL,ZESTRIL) 40 MG tablet Take 1 tablet by mouth Daily. 90 tablet 1    meloxicam (MOBIC) 15 MG tablet          No Known Allergies     Family History   Problem Relation Age of Onset    Diabetes Mother     Cancer Mother     Arthritis Mother     Heart disease Father        Social History     Socioeconomic History    Marital status:    Tobacco Use    Smoking status: Every Day     Packs/day: 1     Types: Cigarettes     Start date: 1/1/1980     Passive exposure: Current    Smokeless tobacco: Never    Tobacco comments:     SMOKED 21-30 YEARS   Vaping Use    Vaping Use: Never used   Substance and Sexual Activity    Alcohol use: Yes     Comment: DRINKS MONTHLY BEER AND LIQUOR    Drug use: Never    Sexual  "activity: Not Currently       Review of Systems   Constitutional:  Negative for chills and fever.   Gastrointestinal:  Negative for abdominal distention, abdominal pain, anal bleeding, blood in stool, constipation, diarrhea and rectal pain.        Vital Signs:   /91 (BP Location: Left arm)   Pulse 72   Ht 165.1 cm (65\")   Wt 114 kg (251 lb)   BMI 41.77 kg/m²      Physical Exam  Vitals and nursing note reviewed.   Constitutional:       General: She is not in acute distress.     Appearance: Normal appearance.   HENT:      Head: Normocephalic.   Cardiovascular:      Rate and Rhythm: Normal rate.   Pulmonary:      Effort: Pulmonary effort is normal.   Abdominal:      Palpations: Abdomen is soft.      Tenderness: There is no guarding.   Musculoskeletal:         General: No deformity. Normal range of motion.   Skin:     General: Skin is warm and dry.      Coloration: Skin is not jaundiced.   Neurological:      General: No focal deficit present.      Mental Status: She is alert and oriented to person, place, and time.   Psychiatric:         Mood and Affect: Mood normal.         Thought Content: Thought content normal.          Result Review :          []  Laboratory  []  Radiology  []  Pathology  []  Microbiology  []  EKG/Telemetry   []  Cardiology/Vascular   []  Old records  I spent 15 minutes caring for Jewels on this date of service. This time includes time spent by me in the following activities: reviewing tests, obtaining and/or reviewing a separately obtained history, performing a medically appropriate examination and/or evaluation, ordering medications, tests, or procedures, and documenting information in the medical record.     Assessment and Plan    Diagnoses and all orders for this visit:    1. Screening for malignant neoplasm of colon (Primary)    2. History of colonic polyps    Other orders  -     polyethylene glycol (MIRALAX) 17 g packet; Take as directed.  Instructions given in office.  Dispense: " 238 g bottle  Dispense: 238 packet; Refill: 0        Follow Up   Return for Schedule colonoscopy on 5/31/2024 with Dr. Ball at [facility].    Hospital arrival time: 0730    Possible risks/complications, benefits, and alternatives to surgical or invasive procedures have been explained to patient and/or legal guardian.    Patient has been evaluated and can tolerate anesthesia and/or sedation. Risks, benefits, and alternatives to anesthesia and sedation have been explained to the patient and/or legal guardian. Patient verbalizes understanding and is willing to proceed with the above plan.     Patient was given instructions and counseling regarding her condition or for health maintenance advice. Please see specific information pulled into the AVS if appropriate.

## 2024-02-23 ENCOUNTER — TELEPHONE (OUTPATIENT)
Dept: FAMILY MEDICINE CLINIC | Facility: CLINIC | Age: 59
End: 2024-02-23
Payer: COMMERCIAL

## 2024-02-23 ENCOUNTER — PREP FOR SURGERY (OUTPATIENT)
Dept: OTHER | Facility: HOSPITAL | Age: 59
End: 2024-02-23
Payer: COMMERCIAL

## 2024-02-23 ENCOUNTER — OFFICE VISIT (OUTPATIENT)
Dept: ORTHOPEDIC SURGERY | Facility: CLINIC | Age: 59
End: 2024-02-23
Payer: COMMERCIAL

## 2024-02-23 VITALS
OXYGEN SATURATION: 96 % | HEIGHT: 65 IN | SYSTOLIC BLOOD PRESSURE: 126 MMHG | DIASTOLIC BLOOD PRESSURE: 84 MMHG | HEART RATE: 73 BPM | BODY MASS INDEX: 41.65 KG/M2 | WEIGHT: 250 LBS

## 2024-02-23 DIAGNOSIS — M25.561 RIGHT KNEE PAIN, UNSPECIFIED CHRONICITY: Primary | ICD-10-CM

## 2024-02-23 DIAGNOSIS — M17.11 PRIMARY OSTEOARTHRITIS OF RIGHT KNEE: Primary | ICD-10-CM

## 2024-02-23 DIAGNOSIS — M17.11 PRIMARY OSTEOARTHRITIS OF RIGHT KNEE: ICD-10-CM

## 2024-02-23 DIAGNOSIS — E66.01 OBESITY, MORBID, BMI 40.0-49.9: ICD-10-CM

## 2024-02-23 RX ORDER — TRANEXAMIC ACID 10 MG/ML
1000 INJECTION, SOLUTION INTRAVENOUS ONCE
OUTPATIENT
Start: 2024-02-23 | End: 2024-02-23

## 2024-02-23 RX ORDER — CEFAZOLIN SODIUM 2 G/100ML
2 INJECTION, SOLUTION INTRAVENOUS ONCE
OUTPATIENT
Start: 2024-02-23 | End: 2024-02-23

## 2024-02-23 NOTE — PROGRESS NOTES
"Chief Complaint  Pain of the Right Knee     Subjective      Jewels Beltrán presents to [facility] for re-evaluation of the right knee. She was last seen in 2021 for osteoarthritis in her right knee. She was last seen at [facility]. She denies any new issues. She denies injury or trauma. She has been getting injections in the past.     No Known Allergies     Social History     Socioeconomic History    Marital status:    Tobacco Use    Smoking status: Every Day     Packs/day: 1     Types: Cigarettes     Start date: 1/1/1980     Passive exposure: Current    Smokeless tobacco: Never    Tobacco comments:     SMOKED 21-30 YEARS   Vaping Use    Vaping Use: Never used   Substance and Sexual Activity    Alcohol use: Yes     Comment: DRINKS MONTHLY BEER AND LIQUOR    Drug use: Never    Sexual activity: Not Currently        I reviewed the patient's chief complaint, history of present illness, review of systems, past medical history, surgical history, family history, social history, medications, and allergy list.     Review of Systems     Constitutional: Denies fevers, chills, weight loss  Cardiovascular: Denies chest pain, shortness of breath  Skin: Denies rashes, acute skin changes  Neurologic: Denies headache, loss of consciousness  MSK: Right knee pain      Vital Signs:   /84   Pulse 73   Ht 165.1 cm (65\")   Wt 113 kg (250 lb)   SpO2 96%   BMI 41.60 kg/m²          Physical Exam  General: Alert. No acute distress    Ortho Exam      Right knee- positive crepitus. ROM -3 to 125 degrees. Stable to varus/valgus stress. Stable to anterior/posterior drawer. Negative Lachman's. Pain with Quynh's. Positive EHL, FHL, GS and TA. Sensation intact to all 5 nerves of the foot. Positive pulses. Mild swelling. No significant effusion.     Procedures    X-Ray Report:  Right knee X-Ray  Indication: Evaluation of right knee pain  AP/Lateral, Standing, and [place] view(s)  Findings: advanced " tricompartmental degenerative changes with bone on bone articulation to the medial compartment. No acute fracture. Tricompartmental  osteophytes  Prior studies available for comparison: no       Imaging Results (Most Recent)       Procedure Component Value Units Date/Time    XR Knee 3 View Right [[id number]] Resulted: 02/23/24 0957     Updated: 02/23/24 1001             Result Review :       No results found.           Assessment and Plan     Diagnoses and all orders for this visit:    1. Right knee pain, unspecified chronicity (Primary)  -     XR Knee 3 View Right    2. Primary osteoarthritis of right knee    3. Obesity, morbid, BMI 40.0-49.9        Discussed the treatment options with the patient, operative vs non-operative. I reviewed the x-rays that were obtained today with the patient. Discussed the risks and benefits of a Right Total Knee Arthroplasty with Lorraine vs continuing conservative treatment with injections, medication and activity modification. The patient expressed understanding and wished to proceed with operative treatment.     Educated on risk of smoking/nicotine. Discussed options for smoking cessation., Discussed surgery., Risks/benefits discussed with patient including, but not limited to: infection, bleeding, neurovascular damage, malunion, nonunion, aesthetic deformity, need for further surgery, and death., Discussed with patient the implant type being used during surgery and patient understands., Surgery pamphlet given., Call or return if worsening symptoms., and DME order for a 3 in 1 given today due to patient will be confined to one room/level of the home that does not offer a toilet during postop recovery.     Follow Up     2 weeks postoperatively.        Patient was given instructions and counseling regarding her condition or for health maintenance advice. Please see specific information pulled into the AVS if appropriate.     Scribed for Bradley Bal MD by Nirmala  Jazmín.  02/23/24   10:00 EST    I have personally performed the services described in this document as scribed by the above individual and it is both accurate and complete. Bradley Bal MD 02/24/24

## 2024-02-26 ENCOUNTER — LAB (OUTPATIENT)
Dept: LAB | Facility: HOSPITAL | Age: 59
End: 2024-02-26
Payer: COMMERCIAL

## 2024-02-26 DIAGNOSIS — M17.11 PRIMARY OSTEOARTHRITIS OF RIGHT KNEE: ICD-10-CM

## 2024-02-26 DIAGNOSIS — I10 PRIMARY HYPERTENSION: ICD-10-CM

## 2024-02-26 DIAGNOSIS — Z13.29 THYROID DISORDER SCREEN: ICD-10-CM

## 2024-02-26 LAB
ALBUMIN SERPL-MCNC: 4.4 G/DL (ref 3.5–5.2)
ALBUMIN/GLOB SERPL: 1.8 G/DL
ALP SERPL-CCNC: 71 U/L (ref 39–117)
ALT SERPL W P-5'-P-CCNC: 29 U/L (ref 1–33)
ANION GAP SERPL CALCULATED.3IONS-SCNC: 12 MMOL/L (ref 5–15)
AST SERPL-CCNC: 17 U/L (ref 1–32)
BASOPHILS # BLD AUTO: 0.05 10*3/MM3 (ref 0–0.2)
BASOPHILS NFR BLD AUTO: 0.7 % (ref 0–1.5)
BILIRUB SERPL-MCNC: 0.6 MG/DL (ref 0–1.2)
BUN SERPL-MCNC: 19 MG/DL (ref 8–23)
BUN/CREAT SERPL: 22.4 (ref 7–25)
CALCIUM SPEC-SCNC: 10.3 MG/DL (ref 8.6–10.5)
CHLORIDE SERPL-SCNC: 106 MMOL/L (ref 98–107)
CHOLEST SERPL-MCNC: 192 MG/DL (ref 0–200)
CO2 SERPL-SCNC: 26 MMOL/L (ref 22–29)
CREAT SERPL-MCNC: 0.85 MG/DL (ref 0.57–1)
DEPRECATED RDW RBC AUTO: 40 FL (ref 37–54)
EGFRCR SERPLBLD CKD-EPI 2021: 78.5 ML/MIN/1.73
EOSINOPHIL # BLD AUTO: 0.17 10*3/MM3 (ref 0–0.4)
EOSINOPHIL NFR BLD AUTO: 2.4 % (ref 0.3–6.2)
ERYTHROCYTE [DISTWIDTH] IN BLOOD BY AUTOMATED COUNT: 12.7 % (ref 12.3–15.4)
GLOBULIN UR ELPH-MCNC: 2.4 GM/DL
GLUCOSE SERPL-MCNC: 96 MG/DL (ref 65–99)
HBA1C MFR BLD: 5.8 % (ref 4.8–5.6)
HCT VFR BLD AUTO: 43.6 % (ref 34–46.6)
HDLC SERPL-MCNC: 41 MG/DL (ref 40–60)
HGB BLD-MCNC: 14.3 G/DL (ref 12–15.9)
IMM GRANULOCYTES # BLD AUTO: 0.01 10*3/MM3 (ref 0–0.05)
IMM GRANULOCYTES NFR BLD AUTO: 0.1 % (ref 0–0.5)
INR PPP: 0.89 (ref 0.86–1.15)
LDLC SERPL CALC-MCNC: 119 MG/DL (ref 0–100)
LDLC/HDLC SERPL: 2.8 {RATIO}
LYMPHOCYTES # BLD AUTO: 1.91 10*3/MM3 (ref 0.7–3.1)
LYMPHOCYTES NFR BLD AUTO: 26.4 % (ref 19.6–45.3)
MCH RBC QN AUTO: 28.6 PG (ref 26.6–33)
MCHC RBC AUTO-ENTMCNC: 32.8 G/DL (ref 31.5–35.7)
MCV RBC AUTO: 87.2 FL (ref 79–97)
MONOCYTES # BLD AUTO: 0.74 10*3/MM3 (ref 0.1–0.9)
MONOCYTES NFR BLD AUTO: 10.2 % (ref 5–12)
NEUTROPHILS NFR BLD AUTO: 4.35 10*3/MM3 (ref 1.7–7)
NEUTROPHILS NFR BLD AUTO: 60.2 % (ref 42.7–76)
NRBC BLD AUTO-RTO: 0 /100 WBC (ref 0–0.2)
PLATELET # BLD AUTO: 291 10*3/MM3 (ref 140–450)
PMV BLD AUTO: 11.8 FL (ref 6–12)
POTASSIUM SERPL-SCNC: 4 MMOL/L (ref 3.5–5.2)
PROT SERPL-MCNC: 6.8 G/DL (ref 6–8.5)
PROTHROMBIN TIME: 12.2 SECONDS (ref 11.8–14.9)
RBC # BLD AUTO: 5 10*6/MM3 (ref 3.77–5.28)
SODIUM SERPL-SCNC: 144 MMOL/L (ref 136–145)
TRIGL SERPL-MCNC: 180 MG/DL (ref 0–150)
TSH SERPL DL<=0.05 MIU/L-ACNC: 1.26 UIU/ML (ref 0.27–4.2)
VLDLC SERPL-MCNC: 32 MG/DL (ref 5–40)
WBC NRBC COR # BLD AUTO: 7.23 10*3/MM3 (ref 3.4–10.8)

## 2024-02-26 PROCEDURE — 85610 PROTHROMBIN TIME: CPT

## 2024-02-26 PROCEDURE — 83036 HEMOGLOBIN GLYCOSYLATED A1C: CPT

## 2024-02-26 PROCEDURE — 80061 LIPID PANEL: CPT

## 2024-02-26 PROCEDURE — 80050 GENERAL HEALTH PANEL: CPT

## 2024-02-26 PROCEDURE — 36415 COLL VENOUS BLD VENIPUNCTURE: CPT

## 2024-03-13 DIAGNOSIS — Z47.1 AFTERCARE FOLLOWING RIGHT KNEE JOINT REPLACEMENT SURGERY: Primary | ICD-10-CM

## 2024-03-13 DIAGNOSIS — M17.11 PRIMARY OSTEOARTHRITIS OF RIGHT KNEE: Primary | ICD-10-CM

## 2024-03-13 DIAGNOSIS — Z96.651 AFTERCARE FOLLOWING RIGHT KNEE JOINT REPLACEMENT SURGERY: Primary | ICD-10-CM

## 2024-04-03 DIAGNOSIS — Z01.818 ENCOUNTER FOR PREADMISSION TESTING: Primary | ICD-10-CM

## 2024-04-10 ENCOUNTER — PRE-ADMISSION TESTING (OUTPATIENT)
Dept: PREADMISSION TESTING | Facility: HOSPITAL | Age: 59
End: 2024-04-10
Payer: COMMERCIAL

## 2024-04-10 VITALS
OXYGEN SATURATION: 95 % | BODY MASS INDEX: 41.62 KG/M2 | DIASTOLIC BLOOD PRESSURE: 76 MMHG | TEMPERATURE: 97.9 F | HEIGHT: 65 IN | HEART RATE: 86 BPM | SYSTOLIC BLOOD PRESSURE: 136 MMHG | RESPIRATION RATE: 16 BRPM | WEIGHT: 249.78 LBS

## 2024-04-10 DIAGNOSIS — Z01.818 ENCOUNTER FOR PREADMISSION TESTING: ICD-10-CM

## 2024-04-10 DIAGNOSIS — M17.11 PRIMARY OSTEOARTHRITIS OF RIGHT KNEE: ICD-10-CM

## 2024-04-10 LAB
ALBUMIN SERPL-MCNC: 4.4 G/DL (ref 3.5–5.2)
ALBUMIN/GLOB SERPL: 1.6 G/DL
ALP SERPL-CCNC: 81 U/L (ref 39–117)
ALT SERPL W P-5'-P-CCNC: 28 U/L (ref 1–33)
ANION GAP SERPL CALCULATED.3IONS-SCNC: 11.5 MMOL/L (ref 5–15)
AST SERPL-CCNC: 21 U/L (ref 1–32)
BASOPHILS # BLD AUTO: 0.06 10*3/MM3 (ref 0–0.2)
BASOPHILS NFR BLD AUTO: 0.7 % (ref 0–1.5)
BILIRUB SERPL-MCNC: 0.7 MG/DL (ref 0–1.2)
BUN SERPL-MCNC: 18 MG/DL (ref 8–23)
BUN/CREAT SERPL: 20.7 (ref 7–25)
CALCIUM SPEC-SCNC: 10.3 MG/DL (ref 8.6–10.5)
CHLORIDE SERPL-SCNC: 102 MMOL/L (ref 98–107)
CO2 SERPL-SCNC: 23.5 MMOL/L (ref 22–29)
CREAT SERPL-MCNC: 0.87 MG/DL (ref 0.57–1)
DEPRECATED RDW RBC AUTO: 43.4 FL (ref 37–54)
EGFRCR SERPLBLD CKD-EPI 2021: 76.4 ML/MIN/1.73
EOSINOPHIL # BLD AUTO: 0.15 10*3/MM3 (ref 0–0.4)
EOSINOPHIL NFR BLD AUTO: 1.7 % (ref 0.3–6.2)
ERYTHROCYTE [DISTWIDTH] IN BLOOD BY AUTOMATED COUNT: 13.2 % (ref 12.3–15.4)
GLOBULIN UR ELPH-MCNC: 2.8 GM/DL
GLUCOSE SERPL-MCNC: 102 MG/DL (ref 65–99)
HBA1C MFR BLD: 5.8 % (ref 4.8–5.6)
HCT VFR BLD AUTO: 45 % (ref 34–46.6)
HGB BLD-MCNC: 14.6 G/DL (ref 12–15.9)
IMM GRANULOCYTES # BLD AUTO: 0.02 10*3/MM3 (ref 0–0.05)
IMM GRANULOCYTES NFR BLD AUTO: 0.2 % (ref 0–0.5)
INR PPP: 0.91 (ref 0.86–1.15)
LYMPHOCYTES # BLD AUTO: 1.74 10*3/MM3 (ref 0.7–3.1)
LYMPHOCYTES NFR BLD AUTO: 19.7 % (ref 19.6–45.3)
MCH RBC QN AUTO: 29 PG (ref 26.6–33)
MCHC RBC AUTO-ENTMCNC: 32.4 G/DL (ref 31.5–35.7)
MCV RBC AUTO: 89.5 FL (ref 79–97)
MONOCYTES # BLD AUTO: 0.9 10*3/MM3 (ref 0.1–0.9)
MONOCYTES NFR BLD AUTO: 10.2 % (ref 5–12)
NEUTROPHILS NFR BLD AUTO: 5.97 10*3/MM3 (ref 1.7–7)
NEUTROPHILS NFR BLD AUTO: 67.5 % (ref 42.7–76)
NRBC BLD AUTO-RTO: 0 /100 WBC (ref 0–0.2)
PLATELET # BLD AUTO: 309 10*3/MM3 (ref 140–450)
PMV BLD AUTO: 11.4 FL (ref 6–12)
POTASSIUM SERPL-SCNC: 4 MMOL/L (ref 3.5–5.2)
PROT SERPL-MCNC: 7.2 G/DL (ref 6–8.5)
PROTHROMBIN TIME: 12.5 SECONDS (ref 11.8–14.9)
RBC # BLD AUTO: 5.03 10*6/MM3 (ref 3.77–5.28)
SODIUM SERPL-SCNC: 137 MMOL/L (ref 136–145)
WBC NRBC COR # BLD AUTO: 8.84 10*3/MM3 (ref 3.4–10.8)

## 2024-04-10 PROCEDURE — 93005 ELECTROCARDIOGRAM TRACING: CPT

## 2024-04-10 PROCEDURE — 85025 COMPLETE CBC W/AUTO DIFF WBC: CPT

## 2024-04-10 PROCEDURE — 36415 COLL VENOUS BLD VENIPUNCTURE: CPT

## 2024-04-10 PROCEDURE — 83036 HEMOGLOBIN GLYCOSYLATED A1C: CPT

## 2024-04-10 PROCEDURE — 85610 PROTHROMBIN TIME: CPT

## 2024-04-10 PROCEDURE — 80053 COMPREHEN METABOLIC PANEL: CPT

## 2024-04-10 NOTE — SIGNIFICANT NOTE
PT WILL USE [facility] [place], PT WILL HAVE TRANSPORTATION, WALKER HAS BEEN ORDERED FOR PT AND TO BE DELIVERED TO [facility]. PT DOESN'T THINK SHE NEEDS THE 3-1 BUT IS NOT SURE.

## 2024-04-10 NOTE — SIGNIFICANT NOTE
PT ATTENDED THE TOTAL JOINT CLASS, RECEIVED EDUCATIONAL MATERIALS AND SURGICAL SOAP. UNDERSTANDING VERBALIZED.

## 2024-04-10 NOTE — DISCHARGE INSTRUCTIONS
IMPORTANT INSTRUCTIONS - PRE-ADMISSION TESTING  DO NOT EAT OR CHEW anything after midnight the night before your procedure.    You may have CLEAR liquids up to _3_____ hours prior to ARRIVAL time. INCLUDES 20 OZ OF GATORADE, NO RED.  Take the following medications the morning of your procedure with JUST A SIP OF WATER:  TYLENOL AS NEEDED. _______________________________________________________________________________________________________________________________________________________________________________________    DO NOT BRING your medications to the hospital with you, UNLESS something has changed since your PRE-Admission Testing appointment.   Hold all vitamins, supplements, and NSAIDS (Non- steroidal anti-inflammatory meds) for one week prior to surgery (you MAY take Tylenol or Acetaminophen). CONTINUE TO HOLD ASPIRIN AND MELOXICAM  If you are diabetic, check your blood sugar the morning of your procedure. If it is less than 70 or if you are feeling symptomatic, call the following number for further instructions: [phone] SAME DAY SURGERY WILL CALL YOUR ARRIVAL TIME 4/16/24 BY 4 P.M._______.  Use your inhalers/nebulizers as usual, the morning of your procedure. BRING YOUR INHALERS with you.NA   Bring your CPAP or BIPAP to hospital, ONLY IF YOU WILL BE SPENDING THE NIGHT. NA  Make sure you have a ride home and have someone who will stay with you the day of your procedure after you go home.  If you have any questions, please call your Pre-Admission Testing Nurse, _______BRENDA_________ at [phone]__.   Per anesthesia request, do not smoke for 24 hours before your procedure or as instructed by your surgeon. !!!!!!!!!!!!!!!!  SHOWER AS DIRECTED AND SCHEDULED ON PAGE 9 OF TOTAL JOINT WORK BOOK  BRING TOTAL JOINT WORK BOOK BACK WITH YOU THE DAY OF SURGERY.   Clear Liquid Diet        Find out when you need to start a clear liquid diet.   Think of “clear liquids” as anything you could read a  newspaper through. This includes things like water, broth, sports drinks, or tea WITHOUT any kind of milk or cream.           Once you are told to start a clear liquid diet, only drink these things until 2 hours before arrival to the hospital or when the hospital says to stop. Total volume limitation: 8 oz.       Clear liquids you CAN drink:   Water   Clear broth: beef, chicken, vegetable, or bone broth with nothing in it   Gatorade   Lemonade or Beni-aid   Soda   Tea, coffee (NO cream or honey)   Jell-O (without fruit)   Popsicles (without fruit or cream)   Italian ices   Juice without pulp: apple, white, grape   You may use salt, pepper, and sugar    Do NOT drink:   Milk or cream   Soy milk, almond milk, coconut milk, or other non-dairy drinks and   creamers   Milkshakes or smoothies   Tomato juice   Orange juice   Grapefruit juice   Cream soups or any other than broth         Clear Liquid Diet:  Do NOT eat any solid food.  Do NOT eat or suck on mints or candy.  Do NOT chew gum.  Do NOT drink thick liquids like milk or juice with pulp in it.  Do NOT add milk, cream, or anything like soy milk or almond milk to coffee or tea.

## 2024-04-12 ENCOUNTER — TELEPHONE (OUTPATIENT)
Dept: ORTHOPEDIC SURGERY | Facility: CLINIC | Age: 59
End: 2024-04-12
Payer: COMMERCIAL

## 2024-04-12 ENCOUNTER — ANESTHESIA EVENT (OUTPATIENT)
Dept: PERIOP | Facility: HOSPITAL | Age: 59
End: 2024-04-12
Payer: COMMERCIAL

## 2024-04-12 NOTE — TELEPHONE ENCOUNTER
I RECEIVED A CALL FROM ADENIKE ROLLINS AT [facility].  PATIENT NEEDS CARDIOLOGIST CLEARANCE DUE TO ABNORMAL EKG  PATIENT WAS NOTIFIED AND APPOINTMENT WAS OBTAINED FOR PATIENT TO SEE CARDIOLOGIST ON TUESDAY 4-16.  PATIENT'S SURGERY  WILL BE CHANGED.  CHAPITO, SURGERY SCHEDULER NOTIFIED.  CLEARANCE PAPERWORK FAXED TO [facility].  PATIENT VOICED UNDERSTANDING.

## 2024-04-12 NOTE — TELEPHONE ENCOUNTER
ATTEMPTED TO REACH PATIENT TO RESCHEDULE SX SCHEDULED FOR 4/17 DUE TO NEEDING CARDIAC CLEARANCE. NO ANSWER, LVM FOR PATIENT TO RETURN MY CALL ON MY DIRECT LINE.

## 2024-04-12 NOTE — SIGNIFICANT NOTE
DR. ALLEN REV. PT CHART, EKG, NOTING ABN CHANGE. REQUESTS CARDIAC CLEARANCE. DR. MARTIN/[facility] RN NOTIFIED.   4/12/24 13:20 Per [place], RN pt has been set up with cardiac for clearance. Await clearance.     5/16/24 CARDS CLEARANCE TO CHART. KT

## 2024-04-15 LAB
QT INTERVAL: 392 MS
QTC INTERVAL: 444 MS

## 2024-04-16 ENCOUNTER — OFFICE VISIT (OUTPATIENT)
Dept: CARDIOLOGY | Facility: CLINIC | Age: 59
End: 2024-04-16
Payer: COMMERCIAL

## 2024-04-16 VITALS
HEART RATE: 100 BPM | BODY MASS INDEX: 41.12 KG/M2 | DIASTOLIC BLOOD PRESSURE: 80 MMHG | SYSTOLIC BLOOD PRESSURE: 116 MMHG | HEIGHT: 65 IN | WEIGHT: 246.8 LBS

## 2024-04-16 DIAGNOSIS — I10 ESSENTIAL HYPERTENSION: ICD-10-CM

## 2024-04-16 DIAGNOSIS — R94.31 EKG, ABNORMAL: Primary | ICD-10-CM

## 2024-04-16 NOTE — PROGRESS NOTES
Chief Complaint  Clearance    Subjective            Jewels Beltrán presents to [facility] C[facility]IOLOG[facility]  History of Present Illness    Jewels is here for cardiac clearance for upcoming knee replacement surgery.  She was seen last in the clinic in 2021.  At that time she had a treadmill stress test which showed no evidence of ischemia and echocardiogram showed no structural abnormalities.  She had a preop EKG yesterday that showed sinus rhythm with nonspecific ST changes.  She has no anginal symptoms.  Denies chest pain or excessive shortness of breath.  She reports a family history of CAD.    PMH  Past Medical History:   Diagnosis Date    Anxiety     Arthritis     Hypertension     Screening for malignant neoplasm of colon 1/31/2024         SURGICALHX  Past Surgical History:   Procedure Laterality Date    COLONOSCOPY      LAPAROSCOPIC TUBAL LIGATION          SOC  Social History     Socioeconomic History    Marital status:    Tobacco Use    Smoking status: Every Day     Current packs/day: 1.00     Average packs/day: 1 pack/day for 44.3 years (44.3 ttl pk-yrs)     Types: Cigarettes     Start date: 1/1/1980     Passive exposure: Current    Smokeless tobacco: Never    Tobacco comments:     SMOKED 21-30 YEARS     INST PER ANESTHESIA PROTOCOL   Vaping Use    Vaping status: Never Used   Substance and Sexual Activity    Alcohol use: Yes     Comment: DRINKS MONTHLY BE[employer] LIQUOR    Drug use: Never    Sexual activity: Defer         FAMHX  Family History   Problem Relation Age of Onset    Diabetes Mother     Cancer Mother     Arthritis Mother     Heart disease Father     Malig Hyperthermia Neg Hx           ALLERGY  No Known Allergies     MEDSCURRENT    Current Outpatient Medications:     hydroCHLOROthiazide (HYDRODIURIL) 12.5 MG tablet, Take 1 tablet by mouth Daily. (Patient taking differently: Take 1 tablet by mouth Daily. LAST DOSE 4/16/24), Disp: 90 tablet, Rfl: 1    lisinopril (PRINIVIL,ZESTRIL)  "40 MG tablet, Take 1 tablet by mouth Daily. (Patient taking differently: Take 1 tablet by mouth Daily. LAST DOSE 4/4/16/24), Disp: 90 tablet, Rfl: 1    meloxicam (MOBIC) 15 MG tablet, Take 1 tablet by mouth Daily. LD 4/3/24, Disp: , Rfl:       Review of Systems   Cardiovascular:  Negative for chest pain, dyspnea on exertion, irregular heartbeat, palpitations and syncope.   Respiratory:  Negative for shortness of breath.         Objective     /80   Pulse 100   Ht 165.1 cm (65\")   Wt 112 kg (246 lb 12.8 oz)   BMI 41.07 kg/m²       General Appearance:   well developed  well nourished  HENT:   oropharynx moist  lips not cyanotic  Neck:  thyroid not enlarged  supple  Respiratory:  no respiratory distress  normal breath sounds  no rales  Cardiovascular:  no jugular venous distention  regular rhythm  apical impulse normal  S1 normal, S2 normal  no S3, no S4   no murmur  no rub, no thrill  carotid pulses normal; no bruit  pedal pulses normal  lower extremity edema: none    Musculoskeletal:  no clubbing of fingers.   normocephalic, head atraumatic  Skin:   warm, dry  Psychiatric:  judgement and insight appropriate  normal mood and affect      Result Review :     The following data was reviewed by: AYAN Muniz on 04/16/2024:    CMP          7/10/2023    06:44 2/26/2024    06:35 4/10/2024    10:26   CMP   Glucose 95  96  102    BUN 20  19  18    Creatinine 0.84  0.85  0.87    EGFR 80.2  78.5  76.4    Sodium 141  144  137    Potassium 3.9  4.0  4.0    Chloride 106  106  102    Calcium 10.5  10.3  10.3    Total Protein 6.9  6.8  7.2    Albumin 4.3  4.4  4.4    Globulin 2.6  2.4  2.8    Total Bilirubin 0.4  0.6  0.7    Alkaline Phosphatase 78  71  81    AST (SGOT) 18  17  21    ALT (SGPT) 23  29  28    Albumin/Globulin Ratio 1.7  1.8  1.6    BUN/Creatinine Ratio 23.8  22.4  20.7    Anion Gap 10.1  12.0  11.5      CBC          7/10/2023    06:44 2/26/2024    06:35 4/10/2024    10:26   CBC   WBC 6.96  7.23 "  8.84    RBC 4.70  5.00  5.03    Hemoglobin 14.2  14.3  14.6    Hematocrit 42.0  43.6  45.0    MCV 89.4  87.2  89.5    MCH 30.2  28.6  29.0    MCHC 33.8  32.8  32.4    RDW 13.0  12.7  13.2    Platelets 254  291  309      Lipid Panel          7/10/2023    06:44 2/26/2024    06:35   Lipid Panel   Total Cholesterol 192  192    Triglycerides 80  180    HDL Cholesterol 50  41    VLDL Cholesterol 15  32    LDL Cholesterol  127  119    LDL/HDL Ratio 2.52  2.80      TSH          7/10/2023    06:44 2/26/2024    06:35   TSH   TSH 1.720  1.260        Data reviewed : Cardiology studies EKG reviewed.  Previous cardiac testing reviewed.      Procedures      Jewels Beltrán  reports that she has been smoking cigarettes. She started smoking about 44 years ago. She has a 44.3 pack-year smoking history. She has been exposed to tobacco smoke. She has never used smokeless tobacco. I have educated her on the risk of diseases from using tobacco products such as cancer, COPD, and heart disease.     I advised her to quit and she is not willing to quit.    I spent 3  minutes counseling the patient.                Assessment and Plan        ASSESSMENT:  Encounter Diagnoses   Name Primary?    EKG, abnormal Yes    Essential hypertension          PLAN:    1.  Abnormal EKG- recently had a reported abnormal EKG for preoperative testing prior to knee replacement surgery.  I reviewed her EKG today, we discussed there were no high risk findings on the tracings.  However with family history of CAD in her father, and risk factors herself, she would like to proceed with stress imaging which we will arrange.  If this is normal we can send clearance to her surgeons office.She is not necessarily having anginal symptoms but has been very sedentary due to her knee.   2.  Essential hypertension stable on current medical therapy.  3.  If stress is normal can follow-up as needed.          Patient was given instructions and counseling regarding her condition  or for health maintenance advice. Please see specific information pulled into the AVS if appropriate.           Alfreda Velazco, APRN   4/16/2024  10:49 EDT

## 2024-04-17 ENCOUNTER — ANESTHESIA (OUTPATIENT)
Dept: PERIOP | Facility: HOSPITAL | Age: 59
End: 2024-04-17
Payer: COMMERCIAL

## 2024-04-17 ENCOUNTER — TELEPHONE (OUTPATIENT)
Dept: ORTHOPEDIC SURGERY | Facility: CLINIC | Age: 59
End: 2024-04-17

## 2024-04-17 NOTE — TELEPHONE ENCOUNTER
Hub staff attempted to follow warm transfer process and was unsuccessful     Caller: Jewels Beltrán    Relationship to patient: Self    Best call back number:[phone]    Patient is needing: CALLBACK FROM [facility] REGARDING STRESS TEST

## 2024-05-06 ENCOUNTER — TELEPHONE (OUTPATIENT)
Dept: CARDIOLOGY | Facility: CLINIC | Age: 59
End: 2024-05-06
Payer: COMMERCIAL

## 2024-05-06 ENCOUNTER — HOSPITAL ENCOUNTER (OUTPATIENT)
Dept: NUCLEAR MEDICINE | Facility: HOSPITAL | Age: 59
Discharge: HOME | End: 2024-05-06
Payer: COMMERCIAL

## 2024-05-06 DIAGNOSIS — R94.31 EKG, ABNORMAL: ICD-10-CM

## 2024-05-06 LAB
BH CV IMMEDIATE POST RECOVERY TECH DATA SYMPTOMS: NORMAL
BH CV IMMEDIATE POST TECH DATA BLOOD PRESSURE: NORMAL MMHG
BH CV IMMEDIATE POST TECH DATA HEART RATE: 107 BPM
BH CV IMMEDIATE POST TECH DATA OXYGEN SATS: 98 %
BH CV REST NUCLEAR ISOTOPE DOSE: 10.7 MCI
BH CV SIX MINUTE RECOVERY TECH DATA BLOOD PRESSURE: NORMAL
BH CV SIX MINUTE RECOVERY TECH DATA HEART RATE: 89 BPM
BH CV SIX MINUTE RECOVERY TECH DATA OXYGEN SATURATION: 97 %
BH CV STRESS BP STAGE 1: NORMAL
BH CV STRESS COMMENTS STAGE 1: NORMAL
BH CV STRESS DOSE REGADENOSON STAGE 1: 0.4
BH CV STRESS DURATION MIN STAGE 1: 0
BH CV STRESS DURATION SEC STAGE 1: 10
BH CV STRESS HR STAGE 1: 103
BH CV STRESS NUCLEAR ISOTOPE DOSE: 34.9 MCI
BH CV STRESS O2 STAGE 1: 99
BH CV STRESS PROTOCOL 1: NORMAL
BH CV STRESS RECOVERY BP: NORMAL MMHG
BH CV STRESS RECOVERY HR: 89 BPM
BH CV STRESS RECOVERY O2: 97 %
BH CV STRESS STAGE 1: 1
BH CV THREE MINUTE POST TECH DATA BLOOD PRESSURE: NORMAL MMHG
BH CV THREE MINUTE POST TECH DATA HEART RATE: 98 BPM
BH CV THREE MINUTE POST TECH DATA OXYGEN SATURATION: 98 %
BH CV THREE MINUTE RECOVERY TECH DATA SYMPTOM: NORMAL
LV EF NUC BP: 63 %
MAXIMAL PREDICTED HEART RATE: 160 BPM
PERCENT MAX PREDICTED HR: 66.88 %
STRESS BASELINE BP: NORMAL MMHG
STRESS BASELINE HR: 75 BPM
STRESS O2 SAT REST: 97 %
STRESS PERCENT HR: 79 %
STRESS POST O2 SAT PEAK: 99 %
STRESS POST PEAK BP: NORMAL MMHG
STRESS POST PEAK HR: 107 BPM
STRESS TARGET HR: 136 BPM

## 2024-05-06 PROCEDURE — A9502 TC99M TETROFOSMIN: HCPCS | Performed by: NURSE PRACTITIONER

## 2024-05-06 PROCEDURE — 93017 CV STRESS TEST TRACING ONLY: CPT

## 2024-05-06 PROCEDURE — 0 TECHNETIUM TETROFOSMIN KIT: Performed by: NURSE PRACTITIONER

## 2024-05-06 PROCEDURE — 78452 HT MUSCLE IMAGE SPECT MULT: CPT

## 2024-05-06 PROCEDURE — 93018 CV STRESS TEST I&R ONLY: CPT | Performed by: INTERNAL MEDICINE

## 2024-05-06 PROCEDURE — 25010000002 REGADENOSON 0.4 MG/5ML SOLUTION: Performed by: NURSE PRACTITIONER

## 2024-05-06 PROCEDURE — 93016 CV STRESS TEST SUPVJ ONLY: CPT | Performed by: NURSE PRACTITIONER

## 2024-05-06 PROCEDURE — 78452 HT MUSCLE IMAGE SPECT MULT: CPT | Performed by: INTERNAL MEDICINE

## 2024-05-06 RX ORDER — REGADENOSON 0.08 MG/ML
0.4 INJECTION, SOLUTION INTRAVENOUS
Status: COMPLETED | OUTPATIENT
Start: 2024-05-06 | End: 2024-05-06

## 2024-05-06 RX ADMIN — REGADENOSON 0.4 MG: 0.08 INJECTION, SOLUTION INTRAVENOUS at 08:51

## 2024-05-06 RX ADMIN — TETROFOSMIN 1 DOSE: 1.38 INJECTION, POWDER, LYOPHILIZED, FOR SOLUTION INTRAVENOUS at 08:52

## 2024-05-06 RX ADMIN — TETROFOSMIN 1 DOSE: 1.38 INJECTION, POWDER, LYOPHILIZED, FOR SOLUTION INTRAVENOUS at 06:58

## 2024-05-14 DIAGNOSIS — Z01.818 ENCOUNTER FOR PREADMISSION TESTING: Primary | ICD-10-CM

## 2024-05-15 ENCOUNTER — PRE-ADMISSION TESTING (OUTPATIENT)
Dept: PREADMISSION TESTING | Facility: HOSPITAL | Age: 59
End: 2024-05-15
Payer: COMMERCIAL

## 2024-05-15 VITALS
DIASTOLIC BLOOD PRESSURE: 68 MMHG | TEMPERATURE: 97.4 F | SYSTOLIC BLOOD PRESSURE: 116 MMHG | WEIGHT: 246.91 LBS | OXYGEN SATURATION: 96 % | BODY MASS INDEX: 39.68 KG/M2 | HEART RATE: 79 BPM | RESPIRATION RATE: 18 BRPM | HEIGHT: 66 IN

## 2024-05-15 DIAGNOSIS — Z01.818 ENCOUNTER FOR PREADMISSION TESTING: ICD-10-CM

## 2024-05-15 LAB
ALBUMIN SERPL-MCNC: 4.3 G/DL (ref 3.5–5.2)
ALBUMIN/GLOB SERPL: 1.5 G/DL
ALP SERPL-CCNC: 84 U/L (ref 39–117)
ALT SERPL W P-5'-P-CCNC: 20 U/L (ref 1–33)
ANION GAP SERPL CALCULATED.3IONS-SCNC: 9.9 MMOL/L (ref 5–15)
AST SERPL-CCNC: 18 U/L (ref 1–32)
BASOPHILS # BLD AUTO: 0.06 10*3/MM3 (ref 0–0.2)
BASOPHILS NFR BLD AUTO: 0.7 % (ref 0–1.5)
BILIRUB SERPL-MCNC: 0.7 MG/DL (ref 0–1.2)
BUN SERPL-MCNC: 15 MG/DL (ref 8–23)
BUN/CREAT SERPL: 17.2 (ref 7–25)
CALCIUM SPEC-SCNC: 10 MG/DL (ref 8.6–10.5)
CHLORIDE SERPL-SCNC: 100 MMOL/L (ref 98–107)
CO2 SERPL-SCNC: 26.1 MMOL/L (ref 22–29)
CREAT SERPL-MCNC: 0.87 MG/DL (ref 0.57–1)
DEPRECATED RDW RBC AUTO: 43.5 FL (ref 37–54)
EGFRCR SERPLBLD CKD-EPI 2021: 76.4 ML/MIN/1.73
EOSINOPHIL # BLD AUTO: 0.11 10*3/MM3 (ref 0–0.4)
EOSINOPHIL NFR BLD AUTO: 1.3 % (ref 0.3–6.2)
ERYTHROCYTE [DISTWIDTH] IN BLOOD BY AUTOMATED COUNT: 13.2 % (ref 12.3–15.4)
GLOBULIN UR ELPH-MCNC: 2.9 GM/DL
GLUCOSE SERPL-MCNC: 167 MG/DL (ref 65–99)
HBA1C MFR BLD: 5.6 % (ref 4.8–5.6)
HCT VFR BLD AUTO: 42.5 % (ref 34–46.6)
HGB BLD-MCNC: 14.2 G/DL (ref 12–15.9)
IMM GRANULOCYTES # BLD AUTO: 0.02 10*3/MM3 (ref 0–0.05)
IMM GRANULOCYTES NFR BLD AUTO: 0.2 % (ref 0–0.5)
INR PPP: 0.97 (ref 0.86–1.15)
LYMPHOCYTES # BLD AUTO: 1.69 10*3/MM3 (ref 0.7–3.1)
LYMPHOCYTES NFR BLD AUTO: 19.4 % (ref 19.6–45.3)
MCH RBC QN AUTO: 30 PG (ref 26.6–33)
MCHC RBC AUTO-ENTMCNC: 33.4 G/DL (ref 31.5–35.7)
MCV RBC AUTO: 89.7 FL (ref 79–97)
MONOCYTES # BLD AUTO: 0.69 10*3/MM3 (ref 0.1–0.9)
MONOCYTES NFR BLD AUTO: 7.9 % (ref 5–12)
NEUTROPHILS NFR BLD AUTO: 6.16 10*3/MM3 (ref 1.7–7)
NEUTROPHILS NFR BLD AUTO: 70.5 % (ref 42.7–76)
NRBC BLD AUTO-RTO: 0 /100 WBC (ref 0–0.2)
PLATELET # BLD AUTO: 284 10*3/MM3 (ref 140–450)
PMV BLD AUTO: 11.3 FL (ref 6–12)
POTASSIUM SERPL-SCNC: 3.7 MMOL/L (ref 3.5–5.2)
PROT SERPL-MCNC: 7.2 G/DL (ref 6–8.5)
PROTHROMBIN TIME: 13.1 SECONDS (ref 11.8–14.9)
RBC # BLD AUTO: 4.74 10*6/MM3 (ref 3.77–5.28)
SODIUM SERPL-SCNC: 136 MMOL/L (ref 136–145)
WBC NRBC COR # BLD AUTO: 8.73 10*3/MM3 (ref 3.4–10.8)

## 2024-05-15 PROCEDURE — 85025 COMPLETE CBC W/AUTO DIFF WBC: CPT

## 2024-05-15 PROCEDURE — 85610 PROTHROMBIN TIME: CPT

## 2024-05-15 PROCEDURE — 80053 COMPREHEN METABOLIC PANEL: CPT

## 2024-05-15 PROCEDURE — 83036 HEMOGLOBIN GLYCOSYLATED A1C: CPT

## 2024-05-15 PROCEDURE — 36415 COLL VENOUS BLD VENIPUNCTURE: CPT

## 2024-05-15 NOTE — DISCHARGE INSTRUCTIONS
IMPORTANT INSTRUCTIONS - PRE-ADMISSION TESTING  DO NOT EAT OR CHEW anything after midnight the night before your procedure.    You may have CLEAR liquids up to 3_ hours prior to ARRIVAL time.   Take the following medications the morning of your procedure with JUST A SIP OF WATER: NONE    DO NOT BRING your medications to the hospital with you, UNLESS something has changed since your PRE-Admission Testing appointment.  Hold all vitamins, supplements, and NSAIDS (Non- steroidal anti-inflammatory meds) for one week prior to surgery (you MAY take Tylenol or Acetaminophen).  If you are diabetic, check your blood sugar the morning of your procedure. If it is less than 70 or if you are feeling symptomatic, call the following number for further instructions: [phone] SAME DAY SURGERY.  Use your inhalers/nebulizers as usual, the morning of your procedure. BRING YOUR INHALERS with you.   Bring your CPAP or BIPAP to hospital, ONLY IF YOU WILL BE SPENDING THE NIGHT.   Make sure you have a ride home and have someone who will stay with you the day of your procedure after you go home.  If you have any questions, please call your Pre-Admission Testing NurseKIM at [phone]_.   Per anesthesia request, do not smoke for 24 hours before your procedure or as instructed by your surgeon.    Clear Liquid Diet        Find out when you need to start a clear liquid diet.   Think of “clear liquids” as anything you could read a newspaper through. This includes things like water, broth, sports drinks, or tea WITHOUT any kind of milk or cream.           Once you are told to start a clear liquid diet, only drink these things until 3 hours before arrival to the hospital or when the hospital says to stop. Total volume limitation: 8 oz.       Clear liquids you CAN drink:   Water   Clear broth: beef, chicken, vegetable, or bone broth with nothing in it   Gatorade   Lemonade or Beni-aid   Soda   Tea, coffee (NO cream or honey)   Gege-O  (without fruit)   Popsicles (without fruit or cream)   Italian ices   Juice without pulp: apple, white, grape   You may use salt, pepper, and sugar    Do NOT drink:   Milk or cream   Soy milk, almond milk, coconut milk, or other non-dairy drinks and   creamers   Milkshakes or smoothies   Tomato juice   Orange juice   Grapefruit juice   Cream soups or any other than broth         Clear Liquid Diet:  Do NOT eat any solid food.  Do NOT eat or suck on mints or candy.  Do NOT chew gum.  Do NOT drink thick liquids like milk or juice with pulp in it.  Do NOT add milk, cream, or anything like soy milk or almond milk to coffee or tea.   PREOPERATIVE (BEFORE SURGERY)              BATHING INSTRUCTIONS  Instructions:    You will need to shower 3 separate times utilizing the soap provided; at the times indicated   below:     - 5/20 PM   - 5/21 AM    - 5/21 PM      Wash your hair and face with normal shampoo and soap, rinse it well before using the surgical soap.      In the shower, wet the skin completely with water from your neck to your feet. Apply the cleanser to your   body ONLY FROM THE NECK TO YOUR FEET.     Do NOT USE THE CLEANSER ON YOUR FACE, HEAD, OR GENITAL (PRIVATE) AREAS.   Keep it out of your eyes, ears, and mouth because of the risk of injury to those areas.      Scrub with a clean washcloth for each bath utilizing the soap provided from the top of your body to the   bottom starting at the neck area.      Pay close attention to your armpits, groin area, and the site of surgery.      Wash your body gently for 5 minutes. Stand outside the stream or turn off the water while scrubbing your   body. Do NOT wash with your regular soap after the surgical cleanser is used.      RINSE THE CLEANSER OFF COMPLETELY with plenty of water. Rinse the area again thoroughly.      Dry off with a clean towel. The surgical soap can cause dryness; however do NOT APPLY LOTION,   CREAM, POWDER, and/or DEODORANT AFTER SHOWERING.     Be  sure to where clean clothes after showering.      Ensure CLEAN BED LINENS AFTER FIRST wash with the surgical soap.      NO PETS ALLOWED IN THE BED with you after utilizing the surgical soap.

## 2024-05-21 NOTE — H&P
Psychiatric   HISTORY AND PHYSICAL    Patient Name: Jewels Beltrán  : 1965  MRN: [id number]  Primary Care Physician:  Ashly Andrew APRN  Date of admission: (Not on file)    Subjective   Subjective     Chief Complaint: Right knee pain    History of Present Illness: The patient is a 58-year-old female with right knee pain.  She has failed conservative measures and wishes to undergo operative treatment.  She reports right knee pain and stiffness.  The symptoms are worse with activity.  She has tried nonoperative management without relief.    Review of Systems : Negative except for those mentioned in the history of present illness    Personal History     Past Medical History:   Diagnosis Date    Abnormal EKG 04/10/2024    EVAL W/CARDIOLOGY-NORMAL  STRESS TEST, NO CP OR SOA    Anxiety     Arthritis     Hypertension     Screening for malignant neoplasm of colon 2024       Past Surgical History:   Procedure Laterality Date    COLONOSCOPY      LAPAROSCOPIC TUBAL LIGATION         Family History: family history includes Arthritis in her mother; Cancer in her mother; Diabetes in her mother; Heart disease in her father. Otherwise pertinent FHx was reviewed and not pertinent to current issue.    Social History:  reports that she has been smoking cigarettes. She started smoking about 44 years ago. She has a 44.4 pack-year smoking history. She has been exposed to tobacco smoke. She has never used smokeless tobacco. She reports current alcohol use. She reports that she does not use drugs.    Home Medications:  hydroCHLOROthiazide and lisinopril    Allergies:  No Known Allergies    Objective    Objective     Vitals:        Physical Exam  General: No apparent distress, alert and oriented x 3  HEENT: Normocephalic/atraumatic  Neck: Supple  Cardiovascular: Regular heart rate  Chest: Unlabored breathing  Abdomen: Soft, nontender and nondistended  Musculoskeletal: Neurovascular intact extremity.  Reduced  range of motion of the knee.  Tender to palpation to the knee.  Positive pulses.  Ambulates with antalgic gait.  Neurological: Grossly intact    Result Review    Result Review:  I have personally reviewed the results from the time of this admission to 5/21/2024 17:00 EDT and agree with these findings:  [x]  Laboratory list / accordion  []  Microbiology  [x]  Radiology  []  EKG/Telemetry   []  Cardiology/Vascular   []  Pathology  []  Old records  []  Other:  Most notable findings include: X-rays: Right knee osteoarthritis      Assessment & Plan   Assessment / Plan     Brief Patient Summary:  Jewels Beltrán is a 58 y.o. female who has right knee osteoarthritis    Active Hospital Problems:  Active Hospital Problems    Diagnosis     **Primary osteoarthritis of right knee      Plan:   I discussed treatment options with the patient.  Operative versus nonoperative treatment was discussed.  Risks and benefits of surgery were discussed.  Informed consent was obtained the patient wishes to proceed with right total knee arthroplasty.    DVT prophylaxis:  No DVT prophylaxis order currently exists.        CODE STATUS:       Admission Status:  I believe this patient meets outpatient status.    Bradley Bal MD

## 2024-05-22 ENCOUNTER — ANESTHESIA EVENT CONVERTED (OUTPATIENT)
Dept: ANESTHESIOLOGY | Facility: HOSPITAL | Age: 59
End: 2024-05-22
Payer: COMMERCIAL

## 2024-05-22 ENCOUNTER — HOSPITAL ENCOUNTER (OUTPATIENT)
Facility: HOSPITAL | Age: 59
Discharge: HOME | End: 2024-05-23
Attending: ORTHOPAEDIC SURGERY | Admitting: ORTHOPAEDIC SURGERY
Payer: COMMERCIAL

## 2024-05-22 ENCOUNTER — APPOINTMENT (OUTPATIENT)
Dept: GENERAL RADIOLOGY | Facility: HOSPITAL | Age: 59
End: 2024-05-22
Payer: COMMERCIAL

## 2024-05-22 DIAGNOSIS — M17.11 PRIMARY LOCALIZED OSTEOARTHRITIS OF RIGHT KNEE: ICD-10-CM

## 2024-05-22 DIAGNOSIS — M17.11 PRIMARY OSTEOARTHRITIS OF RIGHT KNEE: Primary | ICD-10-CM

## 2024-05-22 DIAGNOSIS — R26.2 DIFFICULTY IN WALKING: ICD-10-CM

## 2024-05-22 DIAGNOSIS — Z78.9 DECREASED ACTIVITIES OF DAILY LIVING (ADL): ICD-10-CM

## 2024-05-22 PROCEDURE — 25010000002 HYDROMORPHONE 1 MG/ML SOLUTION: Performed by: NURSE ANESTHETIST, CERTIFIED REGISTERED

## 2024-05-22 PROCEDURE — 99203 OFFICE O/P NEW LOW 30 MIN: CPT | Performed by: INTERNAL MEDICINE

## 2024-05-22 PROCEDURE — 25010000002 CEFAZOLIN PER 500 MG: Performed by: ORTHOPAEDIC SURGERY

## 2024-05-22 PROCEDURE — 73560 X-RAY EXAM OF KNEE 1 OR 2: CPT

## 2024-05-22 PROCEDURE — 20985 CPTR-ASST DIR MS PX: CPT | Performed by: ORTHOPAEDIC SURGERY

## 2024-05-22 PROCEDURE — 25010000002 MORPHINE PER 10 MG: Performed by: ORTHOPAEDIC SURGERY

## 2024-05-22 PROCEDURE — 20985 CPTR-ASST DIR MS PX: CPT | Performed by: PHYSICIAN ASSISTANT

## 2024-05-22 PROCEDURE — 25010000002 PROPOFOL 10 MG/ML EMULSION: Performed by: NURSE ANESTHETIST, CERTIFIED REGISTERED

## 2024-05-22 PROCEDURE — 25010000002 DEXAMETHASONE PER 1 MG: Performed by: ANESTHESIOLOGY

## 2024-05-22 PROCEDURE — 27447 TOTAL KNEE ARTHROPLASTY: CPT | Performed by: ORTHOPAEDIC SURGERY

## 2024-05-22 PROCEDURE — C1713 ANCHOR/SCREW BN/BN,TIS/BN: HCPCS | Performed by: ORTHOPAEDIC SURGERY

## 2024-05-22 PROCEDURE — 97110 THERAPEUTIC EXERCISES: CPT

## 2024-05-22 PROCEDURE — 25010000002 KETOROLAC TROMETHAMINE PER 15 MG: Performed by: ORTHOPAEDIC SURGERY

## 2024-05-22 PROCEDURE — 25010000002 EPINEPHRINE 1 MG/ML SOLUTION: Performed by: ORTHOPAEDIC SURGERY

## 2024-05-22 PROCEDURE — C1776 JOINT DEVICE (IMPLANTABLE): HCPCS | Performed by: ORTHOPAEDIC SURGERY

## 2024-05-22 PROCEDURE — 25010000002 FENTANYL CITRATE (PF) 50 MCG/ML SOLUTION: Performed by: NURSE ANESTHETIST, CERTIFIED REGISTERED

## 2024-05-22 PROCEDURE — 25010000002 SUGAMMADEX 200 MG/2ML SOLUTION: Performed by: NURSE ANESTHETIST, CERTIFIED REGISTERED

## 2024-05-22 PROCEDURE — 25010000002 ONDANSETRON PER 1 MG: Performed by: NURSE ANESTHETIST, CERTIFIED REGISTERED

## 2024-05-22 PROCEDURE — 97116 GAIT TRAINING THERAPY: CPT

## 2024-05-22 PROCEDURE — 97161 PT EVAL LOW COMPLEX 20 MIN: CPT

## 2024-05-22 PROCEDURE — 27447 TOTAL KNEE ARTHROPLASTY: CPT | Performed by: PHYSICIAN ASSISTANT

## 2024-05-22 PROCEDURE — 25010000002 ROPIVACAINE PER 1 MG: Performed by: ORTHOPAEDIC SURGERY

## 2024-05-22 PROCEDURE — 25810000003 SODIUM CHLORIDE 0.9 % SOLUTION: Performed by: ORTHOPAEDIC SURGERY

## 2024-05-22 PROCEDURE — 25810000003 LACTATED RINGERS PER 1000 ML: Performed by: ANESTHESIOLOGY

## 2024-05-22 DEVICE — CAP TOTL KN CMT PRIMARY W/ROSA: Type: IMPLANTABLE DEVICE | Site: KNEE | Status: FUNCTIONAL

## 2024-05-22 DEVICE — ART/SRF KN PERSONA/VE MC EF 8TO11 10MM RT: Type: IMPLANTABLE DEVICE | Site: KNEE | Status: FUNCTIONAL

## 2024-05-22 DEVICE — STEM TIB/KN PERSONA CMT 5D SZE RT: Type: IMPLANTABLE DEVICE | Site: KNEE | Status: FUNCTIONAL

## 2024-05-22 DEVICE — IMPLANTABLE DEVICE: Type: IMPLANTABLE DEVICE | Site: KNEE | Status: FUNCTIONAL

## 2024-05-22 DEVICE — CMT BONE PALACOS R HI/VISC 1X40: Type: IMPLANTABLE DEVICE | Site: KNEE | Status: FUNCTIONAL

## 2024-05-22 DEVICE — COMP FEM/KN PERSONA CR CMT NRW SZ8 RT: Type: IMPLANTABLE DEVICE | Site: KNEE | Status: FUNCTIONAL

## 2024-05-22 RX ORDER — MAGNESIUM HYDROXIDE 1200 MG/15ML
LIQUID ORAL AS NEEDED
Status: DISCONTINUED | OUTPATIENT
Start: 2024-05-22 | End: 2024-05-22 | Stop reason: HOSPADM

## 2024-05-22 RX ORDER — FENTANYL CITRATE 50 UG/ML
INJECTION, SOLUTION INTRAMUSCULAR; INTRAVENOUS AS NEEDED
Status: DISCONTINUED | OUTPATIENT
Start: 2024-05-22 | End: 2024-05-22 | Stop reason: SURG

## 2024-05-22 RX ORDER — ONDANSETRON 2 MG/ML
4 INJECTION INTRAMUSCULAR; INTRAVENOUS EVERY 6 HOURS PRN
Status: DISCONTINUED | OUTPATIENT
Start: 2024-05-22 | End: 2024-05-23 | Stop reason: HOSPADM

## 2024-05-22 RX ORDER — PROMETHAZINE HYDROCHLORIDE 12.5 MG/1
25 TABLET ORAL ONCE AS NEEDED
Status: DISCONTINUED | OUTPATIENT
Start: 2024-05-22 | End: 2024-05-22 | Stop reason: HOSPADM

## 2024-05-22 RX ORDER — PROPOFOL 10 MG/ML
VIAL (ML) INTRAVENOUS AS NEEDED
Status: DISCONTINUED | OUTPATIENT
Start: 2024-05-22 | End: 2024-05-22 | Stop reason: SURG

## 2024-05-22 RX ORDER — MIDAZOLAM HYDROCHLORIDE 2 MG/2ML
2 INJECTION, SOLUTION INTRAMUSCULAR; INTRAVENOUS ONCE
Status: DISCONTINUED | OUTPATIENT
Start: 2024-05-22 | End: 2024-05-22 | Stop reason: HOSPADM

## 2024-05-22 RX ORDER — SODIUM CHLORIDE 9 MG/ML
100 INJECTION, SOLUTION INTRAVENOUS CONTINUOUS
Status: DISCONTINUED | OUTPATIENT
Start: 2024-05-22 | End: 2024-05-23 | Stop reason: HOSPADM

## 2024-05-22 RX ORDER — SODIUM CHLORIDE, SODIUM LACTATE, POTASSIUM CHLORIDE, CALCIUM CHLORIDE 600; 310; 30; 20 MG/100ML; MG/100ML; MG/100ML; MG/100ML
9 INJECTION, SOLUTION INTRAVENOUS CONTINUOUS PRN
Status: DISCONTINUED | OUTPATIENT
Start: 2024-05-22 | End: 2024-05-23 | Stop reason: HOSPADM

## 2024-05-22 RX ORDER — ONDANSETRON 2 MG/ML
INJECTION INTRAMUSCULAR; INTRAVENOUS AS NEEDED
Status: DISCONTINUED | OUTPATIENT
Start: 2024-05-22 | End: 2024-05-22 | Stop reason: SURG

## 2024-05-22 RX ORDER — TRANEXAMIC ACID 10 MG/ML
1000 INJECTION, SOLUTION INTRAVENOUS ONCE
Status: COMPLETED | OUTPATIENT
Start: 2024-05-22 | End: 2024-05-22

## 2024-05-22 RX ORDER — HYDROCODONE BITARTRATE AND ACETAMINOPHEN 7.5; 325 MG/1; MG/1
2 TABLET ORAL EVERY 4 HOURS PRN
Status: DISCONTINUED | OUTPATIENT
Start: 2024-05-22 | End: 2024-05-23 | Stop reason: HOSPADM

## 2024-05-22 RX ORDER — HYDROCODONE BITARTRATE AND ACETAMINOPHEN 7.5; 325 MG/1; MG/1
1 TABLET ORAL EVERY 4 HOURS PRN
Status: DISCONTINUED | OUTPATIENT
Start: 2024-05-22 | End: 2024-05-23 | Stop reason: HOSPADM

## 2024-05-22 RX ORDER — LIDOCAINE HYDROCHLORIDE 20 MG/ML
INJECTION, SOLUTION EPIDURAL; INFILTRATION; INTRACAUDAL; PERINEURAL AS NEEDED
Status: DISCONTINUED | OUTPATIENT
Start: 2024-05-22 | End: 2024-05-22 | Stop reason: SURG

## 2024-05-22 RX ORDER — ONDANSETRON 4 MG/1
4 TABLET, ORALLY DISINTEGRATING ORAL EVERY 6 HOURS PRN
Status: DISCONTINUED | OUTPATIENT
Start: 2024-05-22 | End: 2024-05-23 | Stop reason: HOSPADM

## 2024-05-22 RX ORDER — DEXAMETHASONE SODIUM PHOSPHATE 4 MG/ML
INJECTION, SOLUTION INTRA-ARTICULAR; INTRALESIONAL; INTRAMUSCULAR; INTRAVENOUS; SOFT TISSUE
Status: COMPLETED | OUTPATIENT
Start: 2024-05-22 | End: 2024-05-22

## 2024-05-22 RX ORDER — NALOXONE HCL 0.4 MG/ML
0.4 VIAL (ML) INJECTION
Status: DISCONTINUED | OUTPATIENT
Start: 2024-05-22 | End: 2024-05-23 | Stop reason: HOSPADM

## 2024-05-22 RX ORDER — ACETAMINOPHEN 500 MG
1000 TABLET ORAL ONCE
Status: COMPLETED | OUTPATIENT
Start: 2024-05-22 | End: 2024-05-22

## 2024-05-22 RX ORDER — EPHEDRINE SULFATE 50 MG/ML
INJECTION INTRAVENOUS AS NEEDED
Status: DISCONTINUED | OUTPATIENT
Start: 2024-05-22 | End: 2024-05-22 | Stop reason: SURG

## 2024-05-22 RX ORDER — ROCURONIUM BROMIDE 10 MG/ML
INJECTION, SOLUTION INTRAVENOUS AS NEEDED
Status: DISCONTINUED | OUTPATIENT
Start: 2024-05-22 | End: 2024-05-22 | Stop reason: SURG

## 2024-05-22 RX ORDER — KETOROLAC TROMETHAMINE 15 MG/ML
15 INJECTION, SOLUTION INTRAMUSCULAR; INTRAVENOUS EVERY 6 HOURS
Qty: 4 ML | Refills: 0 | Status: COMPLETED | OUTPATIENT
Start: 2024-05-22 | End: 2024-05-23

## 2024-05-22 RX ORDER — ONDANSETRON 2 MG/ML
4 INJECTION INTRAMUSCULAR; INTRAVENOUS ONCE AS NEEDED
Status: DISCONTINUED | OUTPATIENT
Start: 2024-05-22 | End: 2024-05-22 | Stop reason: HOSPADM

## 2024-05-22 RX ORDER — BUPIVACAINE HYDROCHLORIDE AND EPINEPHRINE 5; 5 MG/ML; UG/ML
INJECTION, SOLUTION EPIDURAL; INTRACAUDAL; PERINEURAL
Status: COMPLETED | OUTPATIENT
Start: 2024-05-22 | End: 2024-05-22

## 2024-05-22 RX ORDER — PROMETHAZINE HYDROCHLORIDE 25 MG/1
25 SUPPOSITORY RECTAL ONCE AS NEEDED
Status: DISCONTINUED | OUTPATIENT
Start: 2024-05-22 | End: 2024-05-22 | Stop reason: HOSPADM

## 2024-05-22 RX ORDER — OXYCODONE HYDROCHLORIDE 5 MG/1
5 TABLET ORAL
Status: DISCONTINUED | OUTPATIENT
Start: 2024-05-22 | End: 2024-05-22 | Stop reason: HOSPADM

## 2024-05-22 RX ORDER — ACETAMINOPHEN 500 MG
1000 TABLET ORAL EVERY 8 HOURS
Status: DISCONTINUED | OUTPATIENT
Start: 2024-05-22 | End: 2024-05-23 | Stop reason: HOSPADM

## 2024-05-22 RX ADMIN — SODIUM CHLORIDE 2000 MG: 9 INJECTION, SOLUTION INTRAVENOUS at 16:43

## 2024-05-22 RX ADMIN — ACETAMINOPHEN 1000 MG: 500 TABLET ORAL at 14:02

## 2024-05-22 RX ADMIN — FENTANYL CITRATE 100 MCG: 50 INJECTION, SOLUTION INTRAMUSCULAR; INTRAVENOUS at 08:49

## 2024-05-22 RX ADMIN — EPHEDRINE SULFATE 15 MG: 50 INJECTION INTRAVENOUS at 08:58

## 2024-05-22 RX ADMIN — PROPOFOL 160 MG: 10 INJECTION, EMULSION INTRAVENOUS at 08:49

## 2024-05-22 RX ADMIN — DEXAMETHASONE SODIUM PHOSPHATE 4 MG: 4 INJECTION, SOLUTION INTRAMUSCULAR; INTRAVENOUS at 08:49

## 2024-05-22 RX ADMIN — ROCURONIUM BROMIDE 50 MG: 10 INJECTION, SOLUTION INTRAVENOUS at 08:49

## 2024-05-22 RX ADMIN — DEXAMETHASONE SODIUM PHOSPHATE 8 MG: 4 INJECTION, SOLUTION INTRAMUSCULAR; INTRAVENOUS at 07:58

## 2024-05-22 RX ADMIN — KETOROLAC TROMETHAMINE 15 MG: 15 INJECTION, SOLUTION INTRAMUSCULAR; INTRAVENOUS at 23:14

## 2024-05-22 RX ADMIN — ONDANSETRON HYDROCHLORIDE 4 MG: 2 SOLUTION INTRAMUSCULAR; INTRAVENOUS at 08:49

## 2024-05-22 RX ADMIN — KETOROLAC TROMETHAMINE 15 MG: 15 INJECTION, SOLUTION INTRAMUSCULAR; INTRAVENOUS at 18:13

## 2024-05-22 RX ADMIN — SUGAMMADEX 200 MG: 100 INJECTION, SOLUTION INTRAVENOUS at 10:14

## 2024-05-22 RX ADMIN — TRANEXAMIC ACID 1000 MG: 10 INJECTION, SOLUTION INTRAVENOUS at 10:01

## 2024-05-22 RX ADMIN — HYDROCODONE BITARTRATE AND ACETAMINOPHEN 2 TABLET: 7.5; 325 TABLET ORAL at 23:19

## 2024-05-22 RX ADMIN — SODIUM CHLORIDE 100 ML/HR: 9 INJECTION, SOLUTION INTRAVENOUS at 12:11

## 2024-05-22 RX ADMIN — SODIUM CHLORIDE, POTASSIUM CHLORIDE, SODIUM LACTATE AND CALCIUM CHLORIDE 9 ML/HR: 600; 310; 30; 20 INJECTION, SOLUTION INTRAVENOUS at 07:33

## 2024-05-22 RX ADMIN — EPHEDRINE SULFATE 10 MG: 50 INJECTION INTRAVENOUS at 09:13

## 2024-05-22 RX ADMIN — LIDOCAINE HYDROCHLORIDE 80 MG: 20 INJECTION, SOLUTION INTRAVENOUS at 08:49

## 2024-05-22 RX ADMIN — SODIUM CHLORIDE, POTASSIUM CHLORIDE, SODIUM LACTATE AND CALCIUM CHLORIDE 9 ML/HR: 600; 310; 30; 20 INJECTION, SOLUTION INTRAVENOUS at 07:39

## 2024-05-22 RX ADMIN — SODIUM CHLORIDE 2000 MG: 9 INJECTION, SOLUTION INTRAVENOUS at 23:14

## 2024-05-22 RX ADMIN — SODIUM CHLORIDE 2 G: 9 INJECTION, SOLUTION INTRAVENOUS at 08:45

## 2024-05-22 RX ADMIN — HYDROMORPHONE HYDROCHLORIDE 0.5 MG: 1 INJECTION, SOLUTION INTRAMUSCULAR; INTRAVENOUS; SUBCUTANEOUS at 09:09

## 2024-05-22 RX ADMIN — KETOROLAC TROMETHAMINE 15 MG: 15 INJECTION, SOLUTION INTRAMUSCULAR; INTRAVENOUS at 12:11

## 2024-05-22 RX ADMIN — EPHEDRINE SULFATE 10 MG: 50 INJECTION INTRAVENOUS at 09:22

## 2024-05-22 RX ADMIN — BUPIVACAINE HYDROCHLORIDE AND EPINEPHRINE BITARTRATE 40 ML: 5; .0091 INJECTION, SOLUTION EPIDURAL; INTRACAUDAL; PERINEURAL at 07:58

## 2024-05-22 RX ADMIN — TRANEXAMIC ACID 1000 MG: 10 INJECTION, SOLUTION INTRAVENOUS at 07:39

## 2024-05-22 RX ADMIN — ACETAMINOPHEN 1000 MG: 500 TABLET ORAL at 07:39

## 2024-05-22 NOTE — THERAPY TREATMENT NOTE
Acute Care - Physical Therapy Treatment Note   Shweta     Patient Name: Jewels Beltrán  : 1965  MRN: [id number]  Today's Date: 2024    Admit date: 2024     Referring Physician: Gualberto Moreno MD     Surgery Date:2024   Procedure(s) (LRB):  RIGHT TOTAL KNEE ARTHROPLASTY WITH LYNN ROBOT (Right)        Visit Dx:     ICD-10-CM ICD-9-CM   1. Primary osteoarthritis of right knee  M17.11 715.16   2. Difficulty in walking  R26.2 719.7     Patient Active Problem List   Diagnosis    Hypertension    Smoker    Anxiety    Abdominal pain, right lower quadrant    Abscess of female pelvis    Arthritis    Thyroid disorder    Paresthesia of left arm    Abdominal discomfort    New onset headache    Carpal tunnel syndrome of left wrist    Snoring    Non-restorative sleep    Class 3 severe obesity with serious comorbidity and body mass index (BMI) of 40.0 to 44.9 in adult    Screening for malignant neoplasm of colon    History of colonic polyps    Primary localized osteoarthritis of right knee    Primary osteoarthritis of right knee     Past Medical History:   Diagnosis Date    Abnormal EKG 04/10/2024    EVAL W/CARDIOLOGY-NORMAL  STRESS TEST, NO CP OR SOA    Anxiety     Arthritis     Hypertension     Screening for malignant neoplasm of colon 2024     Past Surgical History:   Procedure Laterality Date    COLONOSCOPY      LAPAROSCOPIC TUBAL LIGATION       PT Assessment (Last 12 Hours)       PT Evaluation and Treatment       Row Name 24 1357 24 1100       Physical Therapy Time and Intention    Subjective Information complains of;weakness  R knee buckling  -GEENA complains of;pain  -GEENA    Document Type therapy note (daily note)  -GEENA evaluation  -GEENA    Mode of Treatment individual therapy;physical therapy  -GEENA individual therapy;physical therapy  -GEENA    Patient Effort good  -GEENA good  -GEENA    Symptoms Noted During/After Treatment fatigue  -GEENA none  -GEENA      Row Name 24 1357 24 1100        General Information    Patient Profile Reviewed yes  -GEENA --    Patient Observations alert;cooperative;agree to therapy  -GEENA alert;cooperative;agree to therapy  -GEENA    Prior Level of Function independent:  -GEENA independent:;all household mobility;community mobility  -GEENA    Equipment Currently Used at Home -- none  -GEENA    Existing Precautions/Restrictions -- fall;weight bearing  -GEENA    Barriers to Rehab -- none identified  -GEENA      Row Name 05/22/24 1100          Living Environment    Current Living Arrangements home  -GEENA       Row Name 05/22/24 1100          Cognition    Orientation Status (Cognition) oriented x 3  -GEENA       Row Name 05/22/24 1100          Range of Motion Comprehensive    General Range of Motion lower extremity range of motion deficits identified  RLE 5-90°  -GEENA       Row Name 05/22/24 1100          Strength (Manual Muscle Testing)    Strength (Manual Muscle Testing) right lower extremity  3+/5  -GEENA       [facility] Name 05/22/24 1357          Mobility    Extremity Weight-bearing Status right lower extremity  -GEENA     Right Lower Extremity (Weight-bearing Status) weight-bearing as tolerated (WBAT)  -GEENA       Row Name 05/22/24 1357 05/22/24 1100       Bed Mobility    Bed Mobility sit-supine  -GEENA bed mobility (all) activities;supine-sit  -GEENA    All Activities, [place] (Bed Mobility) -- contact guard  -GEENA    Supine-Sit [place] (Bed Mobility) -- contact guard  -GEENA    Sit-Supine [place] (Bed Mobility) modified independence  -GEENA --    Assistive Device (Bed Mobility) bed rails  -GEENA --      Row Name 05/22/24 1357 05/22/24 1100       Transfers    Transfers sit-stand transfer;stand-sit transfer;chair-bed transfer  -GEENA bed-chair transfer;sit-stand transfer  -GEENA      Row Name 05/22/24 1100          Bed-Chair Transfer    Bed-Chair [place] (Transfers) contact guard  -GEENA     Assistive Device (Bed-Chair Transfers) walker, front-wheeled  -GEENA       Row Name 05/22/24 1357          Chair-Bed Transfer     Chair-Bed [place] (Transfers) contact guard  -GEENA     Assistive Device (Chair-Bed Transfers) walker, front-wheeled  -GEENA       Row Name 05/22/24 1357 05/22/24 1100       Sit-Stand Transfer    Sit-Stand [place] (Transfers) contact guard  -GEENA contact guard  -GEENA    Assistive Device (Sit-Stand Transfers) walker, front-wheeled  -GEENA walker, front-wheeled  -GEENA      Row Name 05/22/24 1357          Stand-Sit Transfer    Stand-Sit [place] (Transfers) contact guard  -GEENA     Assistive Device (Stand-Sit Transfers) walker, front-wheeled  -GEENA       Row Name 05/22/24 1357 05/22/24 1100       Gait/Stairs (Locomotion)    Gait/Stairs Locomotion gait/ambulation assistive device  -GEENA gait/ambulation assistive device  -GEENA    [place] Level (Gait) minimum assist (75% patient effort)  -GEENA minimum assist (75% patient effort)  -GEENA    Assistive Device (Gait) walker, front-wheeled  -GEENA walker, front-wheeled  -GEENA    Patient was able to Ambulate yes  -GEENA yes  -GEENA    Distance in Feet (Gait) 25  -GEENA 20  L knee buckled with WB  -GEENA    Pattern (Gait) step-to  -GEENA --    Deviations/Abnormal Patterns (Gait) right sided deviations  R Knee buckling  -GEENA --      Row Name 05/22/24 1100          Safety Issues, Functional Mobility    Impairments Affecting Function (Mobility) balance;pain;range of motion (ROM);strength  -GEENA       Row Name 05/22/24 1357 05/22/24 1100       Balance    Balance Assessment standing dynamic balance  -GEENA standing dynamic balance  -GEENA    Dynamic Standing Balance minimal assist  -GEENA minimal assist  -GEENA    Position/Device Used, Standing Balance walker, front-wheeled  -GEENA walker, front-wheeled  -GEENA      Row Name 05/22/24 1357          Motor Skills    Therapeutic Exercise --  R LE 1x15 Ankle pumps, Heel slides, Quad Sets, Glute Sets, SAQ, LAQ, SLR  -GEENA       Row Name             Wound 05/22/24 0910 Right anterior knee Incision    Wound - Properties Group Placement Date: 05/22/24  -AS Placement Time: 0910  -AS Present on  Original Admission: N  -AS Side: Right  -AS Orientation: anterior  -AS Location: knee  -AS Primary Wound Type: Incision  -AS    Retired Wound - Properties Group Placement Date: 05/22/24  -AS Placement Time: 0910  -AS Present on Original Admission: N  -AS Side: Right  -AS Orientation: anterior  -AS Location: knee  -AS Primary Wound Type: Incision  -AS    Retired Wound - Properties Group Date first assessed: 05/22/24  -AS Time first assessed: 0910  -AS Present on Original Admission: N  -AS Side: Right  -AS Location: knee  -AS Primary Wound Type: Incision  -AS      Row Name             Wound 05/22/24 0910 Right proximal leg Incision    Wound - Properties Group Placement Date: 05/22/24  -AS Placement Time: 0910  -AS Present on Original Admission: N  -AS Side: Right  -AS Orientation: proximal  -AS Location: leg  -AS Primary Wound Type: Incision  -AS    Retired Wound - Properties Group Placement Date: 05/22/24  -AS Placement Time: 0910  -AS Present on Original Admission: N  -AS Side: Right  -AS Orientation: proximal  -AS Location: leg  -AS Primary Wound Type: Incision  -AS    Retired Wound - Properties Group Date first assessed: 05/22/24  -AS Time first assessed: 0910  -AS Present on Original Admission: N  -AS Side: Right  -AS Location: leg  -AS Primary Wound Type: Incision  -AS      Row Name 05/22/24 1100          Plan of Care Review    Plan of Care Reviewed With patient  -GEENA     Outcome Evaluation Pt presents with decreased ROM, strength, transfers and ambulation.  Skilled PT services will be required to address these mobility deficits.  -GEENA       Row Name 05/22/24 1357          Positioning and Restraints    Pre-Treatment Position sitting in chair/recliner  -GEENA     Post Treatment Position bed  -GEENA     In Bed supine;call light within reach;encouraged to call for assist;exit alarm on;R heel elevated  -GEENA       Row Name 05/22/24 1100          Therapy Assessment/Plan (PT)    Patient/Family Therapy Goals Statement (PT) Less  pain  -GEENA     Rehab Potential (PT) good, to achieve stated therapy goals  -GEENA     Criteria for Skilled Interventions Met (PT) skilled treatment is necessary  -GEENA     Therapy Frequency (PT) 2 times/day  -GEENA     Predicted Duration of Therapy Intervention (PT) 10 days  -GEENA     Problem List (PT) problems related to;balance;mobility;range of motion (ROM);strength;pain  -GEENA     Activity Limitations Related to Problem List (PT) unable to ambulate safely;unable to transfer safely  -GEENA       Row Name 05/22/24 1100          PT Evaluation Complexity    History, PT Evaluation Complexity no personal factors and/or comorbidities  -GEENA     Examination of Body Systems (PT Eval Complexity) total of 4 or more elements  -GEENA     Clinical Presentation (PT Evaluation Complexity) stable  -GEENA     Clinical Decision Making (PT Evaluation Complexity) low complexity  -GEENA     Overall Complexity (PT Evaluation Complexity) low complexity  -GEENA       Row Name 05/22/24 1357          Progress Summary (PT)    Progress Toward Functional Goals (PT) progress toward functional goals is good  -GEENA     Daily Progress Summary (PT) Pt presents s/p R TKA with occasional buckling w weight bearing. Skilled therapy remains beneficial to address functional mobility deficits and pain. Will continue through POC.  -GEENA       Row Name 05/22/24 1100          Therapy Plan Review/Discharge Plan (PT)    Therapy Plan Review (PT) evaluation/treatment results reviewed;participants voiced agreement with care plan;participants included;patient  -GEENA       Row Name 05/22/24 1100          Physical Therapy Goals    Transfer Goal Selection (PT) transfer, PT goal 1  -GEENA     Gait Training Goal Selection (PT) gait training, PT goal 1  -GEENA     ROM Goal Selection (PT) ROM, PT goal 1  -GEENA     Strength Goal Selection (PT) strength, PT goal 1  -GEENA       Row Name 05/22/24 1100          Transfer Goal 1 (PT)    Activity/Assistive Device (Transfer Goal 1, PT) transfers, all  -GEENA     [place]  Level/Cues Needed (Transfer Goal 1, PT) independent  -GEENA     Time Frame (Transfer Goal 1, PT) long term goal (LTG);10 days  -GEENA       Row Name 05/22/24 1100          Gait Training Goal 1 (PT)    Activity/Assistive Device (Gait Training Goal 1, PT) gait (walking locomotion);walker, rolling  -GEENA     [place] Level (Gait Training Goal 1, PT) independent  -GEENA     Distance (Gait Training Goal 1, PT) 300  -GEENA     Time Frame (Gait Training Goal 1, PT) long term goal (LTG);10 days  -GEENA       Row Name 05/22/24 1100          ROM Goal 1 (PT)    ROM Goal 1 (PT) Pt will demonstrate knee ROM from 0-110° on the affected side.  -GEENA     Time Frame (ROM Goal 1, PT) long-term goal (LTG);10 days  -GEENA       Row Name 05/22/24 1100          Strength Goal 1 (PT)    Strength Goal 1 (PT) Pt will demonstrate knee extension strength of 5/5 on the affected side.  -GEENA     Time Frame (Strength Goal 1, PT) long term goal (LTG);10 days  -GEENA               User Key  (r) = Recorded By, (t) = Taken By, (c) = Cosigned By      Initials Name Provider Type    GEENA Waqas Batista, PT Physical Therapist    AS Patricia Bull RN Registered Nurse                      PT Recommendation and Plan  Anticipated Discharge Disposition (PT): home with outpatient therapy services  Planned Therapy Interventions (PT): balance training, bed mobility training, gait training, home exercise program, stair training, ROM (range of motion), strengthening, stretching, transfer training  Therapy Frequency (PT): 2 times/day  Progress Summary (PT)  Progress Toward Functional Goals (PT): progress toward functional goals is good  Daily Progress Summary (PT): Pt presents s/p R TKA with occasional buckling w weight bearing. Skilled therapy remains beneficial to address functional mobility deficits and pain. Will continue through POC.  Plan of Care Reviewed With: patient  Outcome Evaluation: Pt presents with decreased ROM, strength, transfers and ambulation.  Skilled PT services  will be required to address these mobility deficits.   Outcome Measures       Row Name 05/22/24 1409 05/22/24 1100          How much help from another person do you currently need...    Turning from your back to your side while in flat bed without using bedrails? 4  -GEENA 3  -GEENA     Moving from lying on back to sitting on the side of a flat bed without bedrails? 3  -GEENA 3  -GEENA     Moving to and from a bed to a chair (including a wheelchair)? 3  -GEENA 3  -GEENA     Standing up from a chair using your arms (e.g., wheelchair, bedside chair)? 3  -GEENA 3  -GEENA     Climbing 3-5 steps with a railing? 2  -GEENA 3  -GEENA     To walk in hospital room? 3  -GEENA 3  -GEENA     AM-PAC 6 Clicks Score (PT) 18  -GEENA 18  -GEENA     Highest Level of Mobility Goal 6 --> Walk 10 steps or more  -GEENA 6 --> Walk 10 steps or more  -GEENA        Functional Assessment    Outcome Measure Options AM-PAC 6 Clicks Basic Mobility (PT)  -GEENA AM-PAC 6 Clicks Basic Mobility (PT)  -GEENA               User Key  (r) = Recorded By, (t) = Taken By, (c) = Cosigned By      Initials Name Provider Type    Waqas Andrews PT Physical Therapist                     Time Calculation:    PT Charges       Row Name 05/22/24 1354 05/22/24 1126          Time Calculation    PT Received On 05/22/24  -GEENA 05/22/24  -GEENA     PT Goal Re-Cert Due Date 05/31/24  -GEENA 05/31/24  -GEENA        Timed Charges    32917 - PT Therapeutic Exercise Minutes 10  -GEENA --     50958 - Gait Training Minutes  8  -GEENA --     53147 - PT Therapeutic Activity Minutes 5  -GEENA --        Untimed Charges    PT Eval/Re-eval Minutes -- 20  -GEENA        Total Minutes    Timed Charges Total Minutes 23  -GEENA --     Untimed Charges Total Minutes -- 20  -GEENA      Total Minutes 23  -GEENA 20  -GEENA               User Key  (r) = Recorded By, (t) = Taken By, (c) = Cosigned By      Initials Name Provider Type    Waqas Andrews PT Physical Therapist                  Therapy Charges for Today       Code Description Service Date Service Provider  Modifiers Qty    97898225402 HC PT EVAL LOW COMPLEXITY 2 5/22/2024 Waqas Batista, PT GP 1    57843896161 HC PT THER PROC EA 15 MIN 5/22/2024 Waqas Batista, PT GP 1    06334929985 HC GAIT TRAINING EA 15 MIN 5/22/2024 Waqas Batista, PT GP 1            PT G-Codes  Outcome Measure Options: AM-PAC 6 Clicks Basic Mobility (PT)  AM-PAC 6 Clicks Score (PT): 18    Waqas Batista, PT  5/22/2024

## 2024-05-22 NOTE — PLAN OF CARE
"Goal Outcome Evaluation:     Pt is alert and oriented. Pt is an assist x 1 to the BSC. Pt has had no complaints of pain. Pt will use meds to beds. Pt is scheduled to go to  [facility] [place]                                           "

## 2024-05-22 NOTE — ANESTHESIA POSTPROCEDURE EVALUATION
Patient: Jewels Beltrán    Procedure Summary       Date: 05/22/24 Room / Location: [facility] OR 06 / [facility] MAIN OR    Anesthesia Start: 0845 Anesthesia Stop: 1030    Procedure: RIGHT TOTAL KNEE ARTHROPLASTY WITH LYNN ROBOT (Right: Knee) Diagnosis:       Primary osteoarthritis of right knee      (Primary osteoarthritis of right knee [M17.11])    Surgeons: Bradley Bal MD Provider: Aldo Graf MD    Anesthesia Type: general, general with block ASA Status: 2            Anesthesia Type: general, general with block    Vitals  Vitals Value Taken Time   /75 05/22/24 1100   Temp 36.2 °C (97.2 °F) 05/22/24 1100   Pulse 81 05/22/24 1104   Resp 12 05/22/24 1100   SpO2 95 % 05/22/24 1104   Vitals shown include unfiled device data.        Post Anesthesia Care and Evaluation    Patient location during evaluation: bedside  Patient participation: complete - patient participated  Level of consciousness: awake  Pain management: adequate    Airway patency: patent  PONV Status: none  Cardiovascular status: acceptable and stable  Respiratory status: acceptable  Hydration status: acceptable

## 2024-05-22 NOTE — ANESTHESIA PROCEDURE NOTES
Peripheral Block      Patient reassessed immediately prior to procedure    Patient location during procedure: pre-op  Start time: 5/22/2024 7:56 AM  Stop time: 5/22/2024 7:58 AM  Reason for block: at surgeon's request and post-op pain management  Performed by  Anesthesiologist: Jordan Frederick MD  Preanesthetic Checklist  Completed: patient identified, IV checked, site marked, risks and benefits discussed, surgical consent, monitors and equipment checked, pre-op evaluation and timeout performed  Prep:  Pt Position: supine  Sterile barriers:alcohol skin prep, partial drape, cap, washed/disinfected hands, mask and gloves  Prep: ChloraPrep  Patient monitoring: blood pressure monitoring, continuous pulse oximetry and EKG  Procedure    Sedation: yes  Performed under: local infiltration  Guidance:ultrasound guided and nerve stimulator    ULTRASOUND INTERPRETATION.  Using ultrasound guidance a 20 G gauge needle was placed in close proximity to the nerve, at which point, under ultrasound guidance anesthetic was injected in the area of the nerve and spread of the anesthesia was seen on ultrasound in close proximity thereto.  There were no abnormalities seen on ultrasound; a digital image was taken; and the patient tolerated the procedure with no complications. Images:still images obtained, printed/placed on chart    Laterality:right  Block Type:adductor canal block  Injection Technique:single-shot  Needle Type:echogenic  Needle Gauge:20 G (4in)  Resistance on Injection: none    Medications Used: dexamethasone (DECADRON) injection 4 mg/mL - Injection, Adductor Canal   8 mg - 5/22/2024 7:58:00 AM  bupivacaine-EPINEPHrine PF (MARCAINE w/EPI) 0.5% -1:190079 injection - Injection, Adductor Canal   40 mL - 5/22/2024 7:58:00 AM      Medications  Comment:Precedex 50mcg added to above solution mixture    Post Assessment  Injection Assessment: negative aspiration for heme, no paresthesia on injection and incremental injection  Patient  Tolerance:comfortable throughout block  Complications:no  Additional Notes  The block or continuous infusion is requested by the referring physician for management of postoperative pain, or pain related to a procedure. Ultrasound guidance (deemed medically necessary). Painless injection, pt was awake and conversant during the procedure without complications. Needle and surrounding structures visualized throughout procedure. No adverse reactions or complications seen during this period. Post-procedure image showed no signs of complication, and anatomy was consistent with an uncomplicated nerve blockade.

## 2024-05-22 NOTE — ANESTHESIA PREPROCEDURE EVALUATION
Anesthesia Evaluation     Patient summary reviewed and Nursing notes reviewed   no history of anesthetic complications:   NPO Solid Status: > 8 hours  NPO Liquid Status: > 2 hours           Airway   Mallampati: III  TM distance: >3 FB  Neck ROM: full  No difficulty expected  Dental      Pulmonary - negative pulmonary ROS and normal exam    breath sounds clear to auscultation  Cardiovascular - normal exam  Exercise tolerance: good (4-7 METS)    Rhythm: regular  Rate: normal    (+) hypertension      Neuro/Psych  (+) headaches, numbness, psychiatric history  GI/Hepatic/Renal/Endo    (+) thyroid problem     Musculoskeletal     Abdominal    Substance History - negative use     OB/GYN negative ob/gyn ROS         Other   arthritis,     ROS/Med Hx Other: <4METS, DECREASED MOBILITY/KNEE PAIN. HX ELEVATED BMI, ATYPICAL C/P,ECHO EF 61-65%,NO VALVES STENOSIS. STRESS 5/6/24 EF 63%,NORMAL. CARDS CLEARANCE.  TJR. KT     Pt does not take replacement thyroid      Phys Exam Other: One chipped molar            Anesthesia Plan    ASA 2     general and general with block     (Patient understands anesthesia not responsible for dental damage.)  intravenous induction     Anesthetic plan, risks, benefits, and alternatives have been provided, discussed and informed consent has been obtained with: patient.    Use of blood products discussed with patient .    Plan discussed with CRNA.    CODE STATUS:

## 2024-05-22 NOTE — OP NOTE
TOTAL KNEE ARTHROPLASTY WITH LYNN ROBOT  Procedure Report    Patient Name:  Jewels Beltrán  YOB: 1965    Date of Surgery:  5/22/2024     Indications:  The patient has had persistent knee pain and x-rays reveal advanced osteoarthritis.  They wish to proceed with operative treatment.  Risks and benefits of surgery were discussed.  Risk of bleeding, infection, damage to neurovascular structures, heart attack, stroke, DVT/PE, anesthesia complications including death, stiffness, instability, periprosthetic fracture, deep infection, among others were discussed.  Informed consent was obtained and they wish to proceed.      Pre-op Diagnosis:   Primary osteoarthritis of right knee [M17.11]       Post-Op Diagnosis Codes:     * Primary osteoarthritis of right knee [M17.11]    Procedure/CPT® Codes:      Procedure(s):  RIGHT TOTAL KNEE ARTHROPLASTY WITH LYNN ROBOT    Staff:  Surgeon(s):  Bradley Bal MD    Assistant: Maxx Perdomo PA    Surgical Approach: Knee Medial Parapatellar        Anesthesia: Choice    Estimated Blood Loss: 50 mL    Implants:    Implant Name Type Inv. Item Serial No.  Lot No. LRB No. Used Action   CMT BONE PALACOS R HI/VISC 1X40 - [id number] Implant CMT BONE PALACOS R HI/VISC 1X40  [facility] [id number] Right 1 Implanted   PAT KN PERSONA ALLPOLY CMT 8.5X32MM - [id number] Implant PAT KN PERSONA ALLPOLY CMT 8.5X32MM  [employer] [id number] Right 1 Implanted   COMP FEM/KN PERSONA CR CMT NRW SZ8 RT - [id number] Implant COMP FEM/KN PERSONA CR CMT NRW SZ8 RT  [employer] [id number] Right 1 Implanted   STEM TIB/KN PERSONA CMT 5D ISAAC RT - [id number] Implant STEM TIB/KN PERSONA CMT 5D ISAAC RT  [employer] [id number] Right 1 Implanted   ART/SRF KN PERSONA/VE MC EF 8TO11 10MM RT - [id number] Implant ART/SRF KN PERSONA/VE MC EF 8TO11 10MM RT  [employer] [id number] Right 1 Implanted       Specimen:          None        Findings: knee  osteoarthritis    Complications: None    Description of Procedure: The patient was brought to the operating room and placed supine on the OR table.  General anesthesia was given.  Preoperatively, the patient received an adductor canal nerve block. The patient was placed supine on the OR table.  All bony prominences were padded. The tourniquet was placed on the thigh. The affected lower extremity was prepped and draped in the usual sterile fashion. Preoperative antibiotic was given. Tranexamic acid intravenously was given at the beginning and the end of the surgery.  At this point, an Esmarch was used to exsanguinate the limb and the tourniquet was inflated. A longitudinal incision was made over the knee and a medial parapatellar arthrotomy was performed.  Appropriate releases were performed to the proximal medial tibia. The pre-patellar fat pad was excised.  The patella was subluxed laterally and the knee was examined. There was tricompartmental wear and osteophyte formation.  The medial and lateral menisci were removed.  Osteophytes of  the femur were debrided.     At this point 2 threaded guidepins were placed at the distal femur and the tracking sensor for the femur for the [place]raine robot were placed.  2 percutaneous stab incisions were made in the proximal tibial shaft and 2 guidepins were placed for the tibial tracking censor was placed.  We then registered the mechanical axis and femoral and tibial landmarks for the Lorraine Robot.  We then assessed the flexion extension gaps and the flexion and extension deformity.  We made our surgical plan and then executed the distal femoral cut, the 4-in-1 femoral cut and the tibial cut. The patella was then everted, resected, and sized.  Drill holes for the patella was made.  At this point the spacer block was placed in flexion extension and fit well.     We then placed the knee in flexion where laminar spreaders were used to open the flexion gaps.  The menisci were removed.   Osteophytes were removed from the posterior femur. The posterior capsule was injected with anesthetic cocktail.  At this point, the appropriately sized tibial tray was chosen.  This was pinned into place in line with the medial one third of the tibial tubercle. We then placed the trial femur. The trial insert was placed and the knee was brought to full extension. It was stable to varus and valgus stress.   The knee was then trialed with range of motion again. The femoral drill holes were made. The tibia was finished with the drill and the punch.  The components were removed. The knee was irrigated and dried. The cement was mixed and the tibia and femur were cemented into place.  The medial congruent spacer was then inserted.  The patella was cemented into place. The knee was irrigated with Irrisept and normal saline Pulsavac lavage.  The medial congruent polyethylene was inserted. The knee was stable to varus and valgus stress. There was good balance to the ligaments medially and laterally. There was good flexion with a stable patella. At this point, the tourniquet was released.  There was minimal bleeding controlled with electrocautery.   The arthrotomy was closed with #1 Vicryl at 30 degrees of flexion, the subcutaneous tissues with 2-0 Vicryl, and the skin with staples.  The percutaneous stab incisions on the tibia were closed with 3-0 nylon in horizontal mattress fashion.  These incisions were covered with Xeroform, 4 x 4, Tegaderm.  An Aquacel dressing was placed and an Ace wrap was placed. Patient awoke from anesthesia in stable condition. There were no complications. All counts were correct.       Assistant: Maxx Perdomo PA  was responsible for performing the following activities: Retraction, Suction, Irrigation, and Placing Dressing and their skilled assistance was necessary for the success of this case.    Bradley Bal MD     Date: 5/22/2024  Time: 10:34 EDT

## 2024-05-22 NOTE — THERAPY EVALUATION
Acute Care - Physical Therapy Initial Evaluation  [facility]     Patient Name: Jewels Beltrán  : 1965  MRN: [id number]  Today's Date: 2024     Admit date: 2024     Referring Physician: Gualberto Moreno MD     Surgery Date:2024   Procedure(s) (LRB):  RIGHT TOTAL KNEE ARTHROPLASTY WITH LYNN ROBOT (Right)          Visit Dx:     ICD-10-CM ICD-9-CM   1. Difficulty in walking  R26.2 719.7   2. Primary osteoarthritis of right knee  M17.11 715.16     Patient Active Problem List   Diagnosis    Hypertension    Smoker    Anxiety    Abdominal pain, right lower quadrant    Abscess of female pelvis    Arthritis    Thyroid disorder    Paresthesia of left arm    Abdominal discomfort    New onset headache    Carpal tunnel syndrome of left wrist    Snoring    Non-restorative sleep    Class 3 severe obesity with serious comorbidity and body mass index (BMI) of 40.0 to 44.9 in adult    Screening for malignant neoplasm of colon    History of colonic polyps    Primary localized osteoarthritis of right knee    Primary osteoarthritis of right knee     Past Medical History:   Diagnosis Date    Abnormal EKG 04/10/2024    EVAL W/CARDIOLOGY-NORMAL  STRESS TEST, NO CP OR SOA    Anxiety     Arthritis     Hypertension     Screening for malignant neoplasm of colon 2024     Past Surgical History:   Procedure Laterality Date    COLONOSCOPY      LAPAROSCOPIC TUBAL LIGATION       PT Assessment (Last 12 Hours)       PT Evaluation and Treatment       Row Name 24 1100          Physical Therapy Time and Intention    Subjective Information complains of;pain  -GEENA     Document Type evaluation  -GEENA     Mode of Treatment individual therapy;physical therapy  -GEENA     Patient Effort good  -GEENA     Symptoms Noted During/After Treatment none  -GEENA       Row Name 24 1100          General Information    Patient Observations alert;cooperative;agree to therapy  -GEENA     Prior Level of Function independent:;all household  mobility;community mobility  -GEENA     Equipment Currently Used at Home none  -GEENA     Existing Precautions/Restrictions fall;weight bearing  -GEENA     Barriers to Rehab none identified  -GEENA       Row Name 05/22/24 1100          Living Environment    Current Living Arrangements home  -GEENA       Row Name 05/22/24 1100          Cognition    Orientation Status (Cognition) oriented x 3  -GEENA       Row Name 05/22/24 1100          Range of Motion Comprehensive    General Range of Motion lower extremity range of motion deficits identified  RLE 5-90°  -GEENA       Row Name 05/22/24 1100          Strength (Manual Muscle Testing)    Strength (Manual Muscle Testing) right lower extremity  3+/5  -GEENA       Row Name 05/22/24 1100          Bed Mobility    Bed Mobility bed mobility (all) activities;supine-sit  -GEENA     All Activities, [place] (Bed Mobility) contact guard  -GEENA     Supine-Sit [place] (Bed Mobility) contact guard  -GEENA       Row Name 05/22/24 1100          Transfers    Transfers bed-chair transfer;sit-stand transfer  -GEENA       Row Name 05/22/24 1100          Bed-Chair Transfer    Bed-Chair [place] (Transfers) contact guard  -GEENA     Assistive Device (Bed-Chair Transfers) walker, front-wheeled  -GEENA       Row Name 05/22/24 1100          Sit-Stand Transfer    Sit-Stand [place] (Transfers) contact guard  -GEENA     Assistive Device (Sit-Stand Transfers) walker, front-wheeled  -GEENA       Row Name 05/22/24 1100          Gait/Stairs (Locomotion)    Gait/Stairs Locomotion gait/ambulation assistive device  -GEENA     [place] Level (Gait) minimum assist (75% patient effort)  -GEENA     Assistive Device (Gait) walker, front-wheeled  -GEENA     Patient was able to Ambulate yes  -GEENA     Distance in Feet (Gait) 20  L knee buckled with WB  -GEENA       Row Name 05/22/24 1100          Safety Issues, Functional Mobility    Impairments Affecting Function (Mobility) balance;pain;range of motion (ROM);strength  -GEENA       Row Name 05/22/24  1100          Balance    Balance Assessment standing dynamic balance  -GEENA     Dynamic Standing Balance minimal assist  -GEENA     Position/Device Used, Standing Balance walker, front-wheeled  -GEENA       Row Name             Wound 05/22/24 0910 Right anterior knee Incision    Wound - Properties Group Placement Date: 05/22/24  -AS Placement Time: 0910  -AS Present on Original Admission: N  -AS Side: Right  -AS Orientation: anterior  -AS Location: knee  -AS Primary Wound Type: Incision  -AS    Retired Wound - Properties Group Placement Date: 05/22/24  -AS Placement Time: 0910  -AS Present on Original Admission: N  -AS Side: Right  -AS Orientation: anterior  -AS Location: knee  -AS Primary Wound Type: Incision  -AS    Retired Wound - Properties Group Date first assessed: 05/22/24  -AS Time first assessed: 0910  -AS Present on Original Admission: N  -AS Side: Right  -AS Location: knee  -AS Primary Wound Type: Incision  -AS      Row Name             Wound 05/22/24 0910 Right proximal leg Incision    Wound - Properties Group Placement Date: 05/22/24  -AS Placement Time: 0910  -AS Present on Original Admission: N  -AS Side: Right  -AS Orientation: proximal  -AS Location: leg  -AS Primary Wound Type: Incision  -AS    Retired Wound - Properties Group Placement Date: 05/22/24  -AS Placement Time: 0910  -AS Present on Original Admission: N  -AS Side: Right  -AS Orientation: proximal  -AS Location: leg  -AS Primary Wound Type: Incision  -AS    Retired Wound - Properties Group Date first assessed: 05/22/24  -AS Time first assessed: 0910  -AS Present on Original Admission: N  -AS Side: Right  -AS Location: leg  -AS Primary Wound Type: Incision  -AS      Row Name 05/22/24 1100          Plan of Care Review    Plan of Care Reviewed With patient  -GEENA     Outcome Evaluation Pt presents with decreased ROM, strength, transfers and ambulation.  Skilled PT services will be required to address these mobility deficits.  -GEENA       Row Name  05/22/24 1100          Therapy Assessment/Plan (PT)    Patient/Family Therapy Goals Statement (PT) Less pain  -GEENA     Rehab Potential (PT) good, to achieve stated therapy goals  -GEENA     Criteria for Skilled Interventions Met (PT) skilled treatment is necessary  -GEENA     Therapy Frequency (PT) 2 times/day  -GEENA     Predicted Duration of Therapy Intervention (PT) 10 days  -GEENA     Problem List (PT) problems related to;balance;mobility;range of motion (ROM);strength;pain  -GEENA     Activity Limitations Related to Problem List (PT) unable to ambulate safely;unable to transfer safely  -GEENA       Row Name 05/22/24 1100          PT Evaluation Complexity    History, PT Evaluation Complexity no personal factors and/or comorbidities  -GEENA     Examination of Body Systems (PT Eval Complexity) total of 4 or more elements  -GEENA     Clinical Presentation (PT Evaluation Complexity) stable  -GEENA     Clinical Decision Making (PT Evaluation Complexity) low complexity  -GEENA     Overall Complexity (PT Evaluation Complexity) low complexity  -GEENA       Row Name 05/22/24 1100          Therapy Plan Review/Discharge Plan (PT)    Therapy Plan Review (PT) evaluation/treatment results reviewed;participants voiced agreement with care plan;participants included;patient  -GEENA       Row Name 05/22/24 1100          Physical Therapy Goals    Transfer Goal Selection (PT) transfer, PT goal 1  -GEENA     Gait Training Goal Selection (PT) gait training, PT goal 1  -GEENA     ROM Goal Selection (PT) ROM, PT goal 1  -GEENA     Strength Goal Selection (PT) strength, PT goal 1  -GEENA       Row Name 05/22/24 1100          Transfer Goal 1 (PT)    Activity/Assistive Device (Transfer Goal 1, PT) transfers, all  -GEENA     [place] Level/Cues Needed (Transfer Goal 1, PT) independent  -GEENA     Time Frame (Transfer Goal 1, PT) long term goal (LTG);10 days  -GEENA       Row Name 05/22/24 1100          Gait Training Goal 1 (PT)    Activity/Assistive Device (Gait Training Goal 1, PT) gait  (walking locomotion);walker, rolling  -GEENA     [place] Level (Gait Training Goal 1, PT) independent  -GEENA     Distance (Gait Training Goal 1, PT) 300  -GEENA     Time Frame (Gait Training Goal 1, PT) long term goal (LTG);10 days  -GEENA       Row Name 05/22/24 1100          ROM Goal 1 (PT)    ROM Goal 1 (PT) Pt will demonstrate knee ROM from 0-110° on the affected side.  -GEENA     Time Frame (ROM Goal 1, PT) long-term goal (LTG);10 days  -GEENA       Row Name 05/22/24 1100          Strength Goal 1 (PT)    Strength Goal 1 (PT) Pt will demonstrate knee extension strength of 5/5 on the affected side.  -GEENA     Time Frame (Strength Goal 1, PT) long term goal (LTG);10 days  -GEENA               User Key  (r) = Recorded By, (t) = Taken By, (c) = Cosigned By      Initials Name Provider Type    GEENA Waqas Batista, PT Physical Therapist    AS Patricia Bull RN Registered Nurse                      PT Recommendation and Plan  Anticipated Discharge Disposition (PT): home with outpatient therapy services  Planned Therapy Interventions (PT): balance training, bed mobility training, gait training, home exercise program, stair training, ROM (range of motion), strengthening, stretching, transfer training  Therapy Frequency (PT): 2 times/day  Plan of Care Reviewed With: patient  Outcome Evaluation: Pt presents with decreased ROM, strength, transfers and ambulation.  Skilled PT services will be required to address these mobility deficits.   Outcome Measures       Row Name 05/22/24 1100             How much help from another person do you currently need...    Turning from your back to your side while in flat bed without using bedrails? 3  -GEENA      Moving from lying on back to sitting on the side of a flat bed without bedrails? 3  -GEENA      Moving to and from a bed to a chair (including a wheelchair)? 3  -GEENA      Standing up from a chair using your arms (e.g., wheelchair, bedside chair)? 3  -GEENA      Climbing 3-5 steps with a railing? 3  -GEENA       To walk in hospital room? 3  -GEENA      AM-PAC 6 Clicks Score (PT) 18  -GEENA      Highest Level of Mobility Goal 6 --> Walk 10 steps or more  -GEENA         Functional Assessment    Outcome Measure Options AM-PAC 6 Clicks Basic Mobility (PT)  -GEENA                User Key  (r) = Recorded By, (t) = Taken By, (c) = Cosigned By      Initials Name Provider Type    Waqas Andrews, AISSATOU Physical Therapist                     Time Calculation:    PT Charges       Row Name 05/22/24 1126             Time Calculation    PT Received On 05/22/24  -GEENA      PT Goal Re-Cert Due Date 05/31/24  -GEENA         Untimed Charges    PT Eval/Re-eval Minutes 20  -GEENA         Total Minutes    Untimed Charges Total Minutes 20  -GEENA       Total Minutes 20  -GEENA                User Key  (r) = Recorded By, (t) = Taken By, (c) = Cosigned By      Initials Name Provider Type    Waqas Andrews, PT Physical Therapist                  Therapy Charges for Today       Code Description Service Date Service Provider Modifiers Qty    79351435006 HC PT EVAL LOW COMPLEXITY 2 5/22/2024 Waqas Batista PT GP 1            PT G-Codes  Outcome Measure Options: AM-PAC 6 Clicks Basic Mobility (PT)  AM-PAC 6 Clicks Score (PT): 18    Waqas Batista PT  5/22/2024

## 2024-05-22 NOTE — H&P
[facility]   Patient Name: Jewels Beltrán  : 1965  MRN: [id number]  Primary Care Physician:  Ashly Andrew APRN  Date of admission: 2024    Subjective   Subjective     Chief Complaint: Knee pain    HPI:    Jewels Beltrán is a 58 y.o. female PMH hypertension and osteoarthritis who presented for scheduled right knee replacement. Patient states that prior to the procedure the pain had gradually been worsening over the past several years. The patient reports pain and functional impairment including activities of daily living, they have moderate to severe resting pain, chronic inflammation, and swelling. The patient states that this pain is no longer relieved with conservative therapies including NSAIDs, injections, and physical therapy. The pain is dull and achy in nature, nonradiating, worse with activity. Because of the above the patient is admitted for an elective right knee repair.  Postoperatively, pain is controlled and she has no current complaints.    Personal History     Past Medical History:  Past Medical History:   Diagnosis Date    Abnormal EKG 04/10/2024    EVAL W/CARDIOLOGY-NORMAL  STRESS TEST, NO CP OR SOA    Anxiety     Arthritis     Hypertension     Screening for malignant neoplasm of colon 2024       Past Surgical History:  Past Surgical History:   Procedure Laterality Date    COLONOSCOPY      LAPAROSCOPIC TUBAL LIGATION         Family History:   Family History   Problem Relation Age of Onset    Diabetes Mother     Cancer Mother     Arthritis Mother     Heart disease Father     Malig Hyperthermia Neg Hx        Social History:   Social History     Socioeconomic History    Marital status:    Tobacco Use    Smoking status: Every Day     Current packs/day: 1.00     Average packs/day: 1 pack/day for 44.4 years (44.4 ttl pk-yrs)     Types: Cigarettes     Start date: 1980     Passive exposure: Current     Smokeless tobacco: Never    Tobacco comments:     SMOKED 21-30 YEARS     INST PER ANESTHESIA PROTOCOL          PT STATES SHE DID NOT SMOKE AM OF SURGERY    Vaping Use    Vaping status: Never Used   Substance and Sexual Activity    Alcohol use: Yes     Comment: DRINKS MONTHLY [employer]    Drug use: Never    Sexual activity: Defer       Home Medications:  hydroCHLOROthiazide and lisinopril    Allergies:  No Known Allergies    Review of Systems   A 14 point review of systems was obtained and otherwise negative unless stated in the HPI    Objective   Objective     Vitals:   Temp:  [97.2 °F (36.2 °C)-98.8 °F (37.1 °C)] 97.5 °F (36.4 °C)  Heart Rate:  [66-91] 81  Resp:  [12-20] 16  BP: ()/(54-79) 124/79  Flow (L/min):  [0-3] 0  FiO2 (%):  [53 %-100 %] 85 %    Physical Exam    Constitutional: Awake, alert, no acute distress   Eyes: Pupils equal, sclerae anicteric, no conjunctival injection   HENT: NCAT, mucous membranes moist   Respiratory: Clear to auscultation bilaterally, nonlabored respirations    Cardiovascular: RRR, no MRG   Gastrointestinal: Positive bowel sounds, soft, nontender, nondistended   Musculoskeletal: No bilateral ankle edema, no clubbing or cyanosis to extremities   Neurologic: Oriented x 3, strength symmetric in all extremities, Cranial Nerves grossly intact to confrontation, speech clear   Skin: No rashes     Result Review    I have personally reviewed the results from the time of this admission to 5/22/2024 13:10 EDT and agree with these findings:  [x]  Laboratory personally reviewed preoperative CMP, CBC, A1c, INR  []  Microbiology  []  Radiology  []  EKG/Telemetry   []  Cardiology/Vascular   []  Pathology  []  Old records  []  Other:      Assessment & Plan   Assessment / Plan   Right knee osteoarthritis status post total knee replacement  Hypertension    Orthopedic surgery following  Postoperative pain control per orthopedic surgery  Patient will be started on anticoagulation   Zofran if  needed for nausea   Hold home HCTZ and lisinopril until BMP can be monitored in a.m. to avoid perioperative hypotension  PT/OT/social work consulted  Trend renal function and electrolytes with a.m. BMP, magnesium   Trend Hgb and WBC with a.m. CBC    Discussed case with: Bedside RN, orthopedic surgery    DVT prophylaxis:  Medical and mechanical DVT prophylaxis orders are present.      CODE STATUS:    Level Of Support Discussed With: Patient  Code Status (Patient has no pulse and is not breathing): CPR (Attempt to Resuscitate)  Medical Interventions (Patient has pulse or is breathing): Full Support      Electronically signed by Gualberto Larkin MD, 05/22/24, 1:10 PM EDT.            11-Mar-2019 16:01

## 2024-05-23 VITALS
HEART RATE: 53 BPM | SYSTOLIC BLOOD PRESSURE: 118 MMHG | HEIGHT: 66 IN | DIASTOLIC BLOOD PRESSURE: 77 MMHG | OXYGEN SATURATION: 96 % | RESPIRATION RATE: 18 BRPM | WEIGHT: 245.81 LBS | BODY MASS INDEX: 39.51 KG/M2 | TEMPERATURE: 97.5 F

## 2024-05-23 LAB
HCT VFR BLD AUTO: 37.6 % (ref 34–46.6)
HGB BLD-MCNC: 12.2 G/DL (ref 12–15.9)

## 2024-05-23 PROCEDURE — 97535 SELF CARE MNGMENT TRAINING: CPT

## 2024-05-23 PROCEDURE — 99213 OFFICE O/P EST LOW 20 MIN: CPT | Performed by: INTERNAL MEDICINE

## 2024-05-23 PROCEDURE — 97116 GAIT TRAINING THERAPY: CPT

## 2024-05-23 PROCEDURE — 97165 OT EVAL LOW COMPLEX 30 MIN: CPT

## 2024-05-23 PROCEDURE — 97150 GROUP THERAPEUTIC PROCEDURES: CPT

## 2024-05-23 PROCEDURE — 97110 THERAPEUTIC EXERCISES: CPT

## 2024-05-23 PROCEDURE — 25010000002 KETOROLAC TROMETHAMINE PER 15 MG: Performed by: ORTHOPAEDIC SURGERY

## 2024-05-23 PROCEDURE — 85014 HEMATOCRIT: CPT | Performed by: ORTHOPAEDIC SURGERY

## 2024-05-23 PROCEDURE — 85018 HEMOGLOBIN: CPT | Performed by: ORTHOPAEDIC SURGERY

## 2024-05-23 PROCEDURE — 94799 UNLISTED PULMONARY SVC/PX: CPT

## 2024-05-23 RX ORDER — ASPIRIN 325 MG
325 TABLET, DELAYED RELEASE (ENTERIC COATED) ORAL DAILY
Qty: 21 TABLET | Refills: 0 | Status: SHIPPED | OUTPATIENT
Start: 2024-05-31

## 2024-05-23 RX ORDER — HYDROCODONE BITARTRATE AND ACETAMINOPHEN 7.5; 325 MG/1; MG/1
1-2 TABLET ORAL EVERY 4 HOURS PRN
Qty: 42 TABLET | Refills: 0 | Status: SHIPPED | OUTPATIENT
Start: 2024-05-23 | End: 2024-05-25 | Stop reason: SDUPTHER

## 2024-05-23 RX ORDER — NALOXONE HYDROCHLORIDE 4 MG/.1ML
SPRAY NASAL
Qty: 2 EACH | Refills: 0 | Status: SHIPPED | OUTPATIENT
Start: 2024-05-23

## 2024-05-23 RX ADMIN — HYDROCODONE BITARTRATE AND ACETAMINOPHEN 1 TABLET: 7.5; 325 TABLET ORAL at 08:54

## 2024-05-23 RX ADMIN — HYDROCODONE BITARTRATE AND ACETAMINOPHEN 2 TABLET: 7.5; 325 TABLET ORAL at 04:53

## 2024-05-23 RX ADMIN — KETOROLAC TROMETHAMINE 15 MG: 15 INJECTION, SOLUTION INTRAMUSCULAR; INTRAVENOUS at 05:56

## 2024-05-23 RX ADMIN — APIXABAN 2.5 MG: 2.5 TABLET, FILM COATED ORAL at 08:40

## 2024-05-23 NOTE — PLAN OF CARE
Goal Outcome Evaluation:      Pt complained of pain twice during the shift, which was managed with oral PRN medication. Pt was able to ambulate 200 ft with a walker and one staff. Pt is scheduled to go to [facility] upon discharge. Deric Parker RN

## 2024-05-23 NOTE — THERAPY EVALUATION
Patient Name: Jweels Beltrán  : 1965    MRN: [id number]                              Today's Date: 2024       Admit Date: 2024    Visit Dx:     ICD-10-CM ICD-9-CM   1. Primary osteoarthritis of right knee  M17.11 715.16   2. Difficulty in walking  R26.2 719.7   3. Primary localized osteoarthritis of right knee  M17.11 715.16   4. Decreased activities of daily living (ADL)  Z78.9 V49.89     Patient Active Problem List   Diagnosis    Hypertension    Smoker    Anxiety    Abdominal pain, right lower quadrant    Abscess of female pelvis    Arthritis    Thyroid disorder    Paresthesia of left arm    Abdominal discomfort    New onset headache    Carpal tunnel syndrome of left wrist    Snoring    Non-restorative sleep    Class 3 severe obesity with serious comorbidity and body mass index (BMI) of 40.0 to 44.9 in adult    Screening for malignant neoplasm of colon    History of colonic polyps    Primary localized osteoarthritis of right knee    Primary osteoarthritis of right knee     Past Medical History:   Diagnosis Date    Abnormal EKG 04/10/2024    EVAL W/CARDIOLOGY-NORMAL  STRESS TEST, NO CP OR SOA    Anxiety     Arthritis     Hypertension     Screening for malignant neoplasm of colon 2024     Past Surgical History:   Procedure Laterality Date    COLONOSCOPY      LAPAROSCOPIC TUBAL LIGATION      TOTAL KNEE ARTHROPLASTY Right 2024    Procedure: RIGHT TOTAL KNEE ARTHROPLASTY WITH LYNN ROBOT;  Surgeon: Bradley Bal MD;  Location: [facility];  Service: Robotics - Ortho;  Laterality: Right;      General Information       Row Name 24 1259 24 1009       OT Time and Intention    Document Type therapy note (daily note)  -ES evaluation  -ES    Mode of Treatment individual therapy;occupational therapy  -ES individual therapy;occupational therapy  -ES      Row Name 24 1259 24 1009       General Information    Patient Profile Reviewed -- yes  -ES    Prior Level of  Function -- independent:;ADL's;all household mobility;community mobility  Patient independent with B and IADLs at baseline. No device for functional mobility. Tub/shower, purchased shower chair for post surgical use. [place] in stance. No home O2 use. Denies recent falls.  -ES    Existing Precautions/Restrictions fall;weight bearing  -ES fall;weight bearing  -ES      Row Name 05/23/24 1009          Occupational Profile    Reason for Services/Referral (Occupational Profile) Patient is 58 yr old female admitted to [facility] on 5/22/2024 after failed conservative management of right knee osteoarthritis. Patient is POD 1 R TKR, WBAT RLE.  OT evaluation and treatment ordered d/t recent decline in ADLs/transfer ability and discharge planning recommendations. No previous OT services for current condition.  -ES       Row Name 05/23/24 1009          Living Environment    People in Home alone  -ES       Row Name 05/23/24 1259 05/23/24 1009       Cognition    Orientation Status (Cognition) --  patient receptive to all education and training provided, all questions addressed  -ES oriented x 3  -ES      Row Name 05/23/24 1009          Safety Issues, Functional Mobility    Impairments Affecting Function (Mobility) balance;pain;range of motion (ROM);strength  -ES               User Key  (r) = Recorded By, (t) = Taken By, (c) = Cosigned By      Initials Name Provider Type    Mavis Vieira, OTR/L, CSRS Occupational Therapist                     Mobility/ADL's       Row Name 05/23/24 1136          Bed Mobility    Bed Mobility bed mobility (all) activities  -ES     All Activities, [place] (Bed Mobility) not tested  -ES     Comment, (Bed Mobility) not assessed. Patient met seated in chair.  -ES       Row Name 05/23/24 1307 05/23/24 1136       Transfers    Transfers -- sit-stand transfer;stand-sit transfer  -ES    Comment, (Transfers) patient provided education and training on body mechanics, hand placement, and  rolling walker management for independent transfers at time of discharge  -ES --      Row Name 05/23/24 1136          Sit-Stand Transfer    Sit-Stand [place] (Transfers) contact guard;1 person assist  -ES     Assistive Device (Sit-Stand Transfers) walker, front-wheeled  -ES       Row Name 05/23/24 1136          Stand-Sit Transfer    Stand-Sit [place] (Transfers) contact guard;1 person assist  -ES     Assistive Device (Stand-Sit Transfers) walker, front-wheeled  -ES       Row Name 05/23/24 1307 05/23/24 1136       Functional Mobility    Functional Mobility- Ind. Level -- contact guard assist;1 person  -ES    Functional Mobility- Device -- walker, front-wheeled  -ES    Functional Mobility- Comment patient [facility] with use of rolling walker. Patient provided initial training on rolling walker managmenet with turns to maintain all 4 lwgs on ground for patient safety  -ES --      Row Name 05/23/24 1307 05/23/24 1136       Activities of Daily Living    BADL Assessment/Intervention upper body dressing;lower body dressing  -ES bathing;upper body dressing;lower body dressing;grooming;feeding;toileting  -ES      Row Name 05/23/24 1136          Mobility    Extremity Weight-bearing Status right lower extremity  -ES     Right Lower Extremity (Weight-bearing Status) weight-bearing as tolerated (WBAT)  -ES       Row Name 05/23/24 1136          Bathing Assessment/Intervention    [place] Level (Bathing) bathing skills;minimum assist (75% patient effort)  -ES       Row Name 05/23/24 1307 05/23/24 1136       Upper Body Dressing Assessment/Training    [place] Level (Upper Body Dressing) upper body dressing skills;don;bra/undergarment;pull-over garment;set up  -ES upper body dressing skills;set up  -ES    Position (Upper Body Dressing) unsupported sitting  -ES --      Row Name 05/23/24 1307 05/23/24 1136       Lower Body Dressing Assessment/Training    [place] Level  (Lower Body Dressing) lower body dressing skills;don;pants/bottoms;shoes/slippers;socks;minimum assist (75% patient effort)  -ES lower body dressing skills;minimum assist (75% patient effort)  -ES    Position (Lower Body Dressing) unsupported sitting;supported standing  -ES --    Comment, (Lower Body Dressing) patient provided education and training on adaptive lower body dressing strategies in preperation for independent LB ADLs at time of discharge. Patient provided return demonstration for ensured understanding.  -ES --      Row Name 05/23/24 1136          Self-Feeding Assessment/Training    [place] Level (Feeding) feeding skills;set up  -ES       Row Name 05/23/24 1136          Toileting Assessment/Training    [place] Level (Toileting) toileting skills;contact guard assist  -ES               User Key  (r) = Recorded By, (t) = Taken By, (c) = Cosigned By      Initials Name Provider Type    ES Mavis Hall, OTR/L, CSRS Occupational Therapist                   Obj/Interventions       Row Name 05/23/24 1138          Range of Motion Comprehensive    General Range of Motion bilateral upper extremity ROM WNL  -ES       [facility] Name 05/23/24 1138          Strength Comprehensive (MMT)    General Manual Muscle Testing (MMT) Assessment lower extremity strength deficits identified  -ES     Comment, General Manual Muscle Testing (MMT) Assessment BUEs 4+/5  -ES       Row Name 05/23/24 1138          Motor Skills    Motor Skills functional endurance  -ES     Functional Endurance fair  -ES       Row Name 05/23/24 1309 05/23/24 1138       Balance    Balance Assessment -- sitting dynamic balance;standing dynamic balance  -ES    Dynamic Sitting Balance -- independent  -ES    Position, Sitting Balance -- unsupported;sitting in chair  -ES    Dynamic Standing Balance -- contact guard;1-person assist  -ES    Position/Device Used, Standing Balance -- supported;walker, front-wheeled  -ES    Balance Interventions standing;dynamic  reaching;occupation based/functional task  -ES --    Comment, Balance patient provided education and training on dynamic ADL engagement in supported stance or seated position for patient safety with dynamic ADLs at time of discharge  -ES --              User Key  (r) = Recorded By, (t) = Taken By, (c) = Cosigned By      Initials Name Provider Type    Mavis Vieira, OTR/L, CSRS Occupational Therapist                   Goals/Plan       Row Name 05/23/24 1156          Bed Mobility Goal 1 (OT)    Activity/Assistive Device (Bed Mobility Goal 1, OT) bed mobility activities, all  -ES     [place] Level/Cues Needed (Bed Mobility Goal 1, OT) modified independence  -ES     Time Frame (Bed Mobility Goal 1, OT) long term goal (LTG);10 days  -ES       Row Name 05/23/24 1156          Transfer Goal 1 (OT)    Activity/Assistive Device (Transfer Goal 1, OT) transfers, all  -ES     [place] Level/Cues Needed (Transfer Goal 1, OT) modified independence  -ES     Time Frame (Transfer Goal 1, OT) long term goal (LTG);10 days  -ES       Row Name 05/23/24 1156          Bathing Goal 1 (OT)    Activity/Device (Bathing Goal 1, OT) bathing skills, all  -ES     [place] Level/Cues Needed (Bathing Goal 1, OT) modified independence  -ES     Time Frame (Bathing Goal 1, OT) long term goal (LTG);10 days  -ES       Row Name 05/23/24 1156          Dressing Goal 1 (OT)    Activity/Device (Dressing Goal 1, OT) dressing skills, all  -ES     [place]/Cues Needed (Dressing Goal 1, OT) modified independence  -ES     Time Frame (Dressing Goal 1, OT) long term goal (LTG);10 days  -ES       Row Name 05/23/24 1156          Toileting Goal 1 (OT)    Activity/Device (Toileting Goal 1, OT) toileting skills, all  -ES     [place] Level/Cues Needed (Toileting Goal 1, OT) modified independence  -ES     Time Frame (Toileting Goal 1, OT) long term goal (LTG);10 days  -ES       Row Name 05/23/24 1156          Grooming Goal 1 (OT)    Activity/Device  (Grooming Goal 1, OT) grooming skills, all  -ES     [place] (Grooming Goal 1, OT) modified independence  -ES     Time Frame (Grooming Goal 1, OT) long term goal (LTG);10 days  -ES       Row Name 05/23/24 1156          Therapy Assessment/Plan (OT)    Planned Therapy Interventions (OT) activity tolerance training;BADL retraining;functional balance retraining;occupation/activity based interventions;patient/caregiver education/training;strengthening exercise;transfer/mobility retraining  -ES               User Key  (r) = Recorded By, (t) = Taken By, (c) = Cosigned By      Initials Name Provider Type    ES Mavis Hall, OTR/L, CSRS Occupational Therapist                   Clinical Impression       Row Name 05/23/24 1309 05/23/24 1141       Plan of Care Review    Plan of Care Reviewed With -- patient  -ES    Outcome Evaluation Patient provided education and training on use of adaptive strategies to increase patient safety and independence with B and IADL task engagement, transfer training to maxamize patient safety with all functional transfers, and home modification for patient success and independence at time of discharge. Patient receptive to all education and training provided.  -ES Patient has experienced decline in function from baseline status, presenting w/ deficits related to  that impede patient independence with activities of daily living.  Patient would benefit from skilled Occupational Therapy intervention to maxamize patient safety, and promote return to baseline independence.  -ES      Row Name 05/23/24 0434          Therapy Assessment/Plan (OT)    Rehab Potential (OT) good, to achieve stated therapy goals  -ES     Criteria for Skilled Therapeutic Interventions Met (OT) yes;meets criteria;skilled treatment is necessary  -ES     Therapy Frequency (OT) 5 times/wk  -ES       Row Name 05/23/24 5075          Therapy Plan Review/Discharge Plan (OT)    Anticipated Discharge Disposition (OT) home with  outpatient therapy services  -ES       Row Name 05/23/24 1144          Positioning and Restraints    Pre-Treatment Position sitting in chair/recliner  -ES     Post Treatment Position chair  -ES     In Chair reclined;call light within reach;encouraged to call for assist;exit alarm on  -ES               User Key  (r) = Recorded By, (t) = Taken By, (c) = Cosigned By      Initials Name Provider Type    ES Mavis Hall, OTR/L, CSRS Occupational Therapist                   Outcome Measures       Row Name 05/23/24 1256          How much help from another is currently needed...    Putting on and taking off regular lower body clothing? 3  -ES     Bathing (including washing, rinsing, and drying) 3  -ES     Toileting (which includes using toilet bed pan or urinal) 3  -ES     Putting on and taking off regular upper body clothing 4  -ES     Taking care of personal grooming (such as brushing teeth) 4  -ES     Eating meals 4  -ES     AM-PAC 6 Clicks Score (OT) 21  -ES       Row Name 05/23/24 1200 05/23/24 0845       How much help from another person do you currently need...    Turning from your back to your side while in flat bed without using bedrails? 4  -RH 4  -JW    Moving from lying on back to sitting on the side of a flat bed without bedrails? 3  -RH 3  -JW    Moving to and from a bed to a chair (including a wheelchair)? 3  -RH 3  -JW    Standing up from a chair using your arms (e.g., wheelchair, bedside chair)? 3  -RH 3  -JW    Climbing 3-5 steps with a railing? 3  -RH 3  -JW    To walk in hospital room? 3  -RH 3  -JW    AM-PAC 6 Clicks Score (PT) 19  -RH 19  -JW    Highest Level of Mobility Goal 6 --> Walk 10 steps or more  -RH 6 --> Walk 10 steps or more  -JW      Row Name 05/23/24 1256          Functional Assessment    Outcome Measure Options AM-PAC 6 Clicks Daily Activity (OT)  -ES               User Key  (r) = Recorded By, (t) = Taken By, (c) = Cosigned By      Initials Name Provider Type    Dax Graham PTA  Physical Therapist Assistant    Sophia Olmedo RN Registered Nurse    Mavis Vieira, OTR/L, CSRS Occupational Therapist                      OT Recommendation and Plan  Planned Therapy Interventions (OT): activity tolerance training, BADL retraining, functional balance retraining, occupation/activity based interventions, patient/caregiver education/training, strengthening exercise, transfer/mobility retraining  Therapy Frequency (OT): 5 times/wk  Plan of Care Review  Plan of Care Reviewed With: patient  Outcome Evaluation: Patient provided education and training on use of adaptive strategies to increase patient safety and independence with B and IADL task engagement, transfer training to maxamize patient safety with all functional transfers, and home modification for patient success and independence at time of discharge. Patient receptive to all education and training provided.     Time Calculation:   Evaluation Complexity (OT)  Review Occupational Profile/Medical/Therapy History Complexity: brief/low complexity  Assessment, Occupational Performance/Identification of Deficit Complexity: 3-5 performance deficits  Clinical Decision Making Complexity (OT): problem focused assessment/low complexity  Overall Complexity of Evaluation (OT): low complexity     Time Calculation- OT       Row Name 05/23/24 1259 05/23/24 1237          Time Calculation- OT    OT Received On 05/23/24  -ES --     OT Goal Re-Cert Due Date 06/01/24  -ES --        Timed Charges    68872 - Gait Training Minutes  -- 7  -RH     96392 - OT Self Care/Mgmt Minutes 25  -ES --        Untimed Charges    OT Eval/Re-eval Minutes 17  -ES --        Total Minutes    Timed Charges Total Minutes 25  -ES 7  -RH     Untimed Charges Total Minutes 17  -ES --      Total Minutes 42  -ES 7  -RH               User Key  (r) = Recorded By, (t) = Taken By, (c) = Cosigned By      Initials Name Provider Type    RH Dax Gutierrez, MICHAEL Physical Therapist Assistant     ES Mavis Hall OTR/L, CSRS Occupational Therapist                  Therapy Charges for Today       Code Description Service Date Service Provider Modifiers Qty    53212683631 HC OT SELF CARE/MGMT/TRAIN EA 15 MIN 5/23/2024 Mavis Hall OTR/L, CSRS GO 2    [id number]  OT EVAL LOW COMPLEXITY 2 5/23/2024 Mavis Hall OTR/L, CSRS GO 1                 JUAN FRANCISCO Meyer/L, CSRS  5/23/2024

## 2024-05-23 NOTE — THERAPY TREATMENT NOTE
Acute Care - Physical Therapy Treatment Note   Shweta     Patient Name: Jewels Beltrán  : 1965  MRN: [id number]  Today's Date: 2024      Visit Dx:     ICD-10-CM ICD-9-CM   1. Primary osteoarthritis of right knee  M17.11 715.16   2. Difficulty in walking  R26.2 719.7   3. Primary localized osteoarthritis of right knee  M17.11 715.16   4. Decreased activities of daily living (ADL)  Z78.9 V49.89     Patient Active Problem List   Diagnosis    Hypertension    Smoker    Anxiety    Abdominal pain, right lower quadrant    Abscess of female pelvis    Arthritis    Thyroid disorder    Paresthesia of left arm    Abdominal discomfort    New onset headache    Carpal tunnel syndrome of left wrist    Snoring    Non-restorative sleep    Class 3 severe obesity with serious comorbidity and body mass index (BMI) of 40.0 to 44.9 in adult    Screening for malignant neoplasm of colon    History of colonic polyps    Primary localized osteoarthritis of right knee    Primary osteoarthritis of right knee     Past Medical History:   Diagnosis Date    Abnormal EKG 04/10/2024    EVAL W/CARDIOLOGY-NORMAL  STRESS TEST, NO CP OR SOA    Anxiety     Arthritis     Hypertension     Screening for malignant neoplasm of colon 2024     Past Surgical History:   Procedure Laterality Date    COLONOSCOPY      LAPAROSCOPIC TUBAL LIGATION      TOTAL KNEE ARTHROPLASTY Right 2024    Procedure: RIGHT TOTAL KNEE ARTHROPLASTY WITH LYNN ROBOT;  Surgeon: Bradley Bal MD;  Location: [facility]  Service: Robotics - Ortho;  Laterality: Right;     PT Assessment (Last 12 Hours)       PT Evaluation and Treatment       Row Name 24 1459 24 1240       Physical Therapy Time and Intention    Subjective Information complains of;weakness  - complains of;weakness;pain  -RH    Document Type therapy note (daily note)  -RH therapy note (daily note)  -RH    Mode of Treatment physical therapy;individual therapy  -RH physical  therapy;group therapy;individual therapy  -    Patient Effort good  -RH good  -RH    Comment Pt was offered opportunity to practice steps again but declined.  - Gait training performed individually; therapeutic exercises performed in a group setting with 4 participants  -RH      Row Name 05/23/24 1240          General Information    Patient Observations alert;cooperative;agree to therapy  -RH       Row Name 05/23/24 1240          Pain    Additional Documentation Pain Scale: FACES Pre/Post-Treatment (Group)  -RH       Row Name 05/23/24 1459 05/23/24 1240       Pain Scale: FACES Pre/Post-Treatment    Pain: FACES Scale, Pretreatment 0-->no hurt  - 2-->hurts little bit  -RH    Posttreatment Pain Rating 0-->no hurt  -RH 2-->hurts little bit  -RH    Pain Location - Side/Orientation -- Right  -    Pain Location - -- knee  -RH      Row Name 05/23/24 1240          Range of Motion (ROM)    Range of Motion --  Pt R knee AAROM at 100 degrees flex and 8 degrees ext.  -RH       Row Name 05/23/24 1240          Strength (Manual Muscle Testing)    Strength (Manual Muscle Testing) --  Pt R knee ext strength at 2/5.  -RH       Row Name 05/23/24 1240          Mobility    Extremity Weight-bearing Status right lower extremity  -     Right Lower Extremity (Weight-bearing Status) weight-bearing as tolerated (WBAT)  -RH       Row Name 05/23/24 1240          Transfers    Transfers sit-stand transfer;stand-sit transfer  -RH       Row Name 05/23/24 1240          Sit-Stand Transfer    Sit-Stand [place] (Transfers) contact guard  -     Assistive Device (Sit-Stand Transfers) walker, front-wheeled  -RH       Row Name 05/23/24 1240          Stand-Sit Transfer    Stand-Sit [place] (Transfers) contact guard  -     Assistive Device (Stand-Sit Transfers) walker, front-wheeled  -RH       Row Name 05/23/24 1240          Gait/Stairs (Locomotion)    Gait/Stairs Locomotion gait/ambulation assistive device  -     [place] Level  (Gait) contact guard  -RH     Assistive Device (Gait) walker, front-wheeled  -RH     Patient was able to Ambulate yes  -RH     Distance in Feet (Gait) 50  -RH     Pattern (Gait) 3-point;step-through  -RH     Deviations/Abnormal Patterns (Gait) gait speed decreased;stride length decreased  -RH     Right Sided Gait Deviations heel strike decreased;knee buckling, right side  -RH     Gait Assessment/Intervention Pt amb with RW and CGA with short measured steps to help minimize R knee buckling.  Pt amb with decreased gait speed, stride length, and RLE heel strike.  -RH     Negotiation (Stairs) stairs independence;stairs assistive device;handrail location;number of steps;ascending technique;descending technique  -RH     [place] Level (Stairs) contact guard;minimum assist (75% patient effort)  -RH     Handrail Location (Stairs) both sides  -RH     Number of Steps (Stairs) 5 x 2  -RH     Ascending Technique (Stairs) step-to-step  -RH     Descending Technique (Stairs) step-to-step  -RH     Stairs, Safety Issues --  Pt R knee partially buckled x 1 with pt attempting to advance her LLE up to the first step.  -       Row Name 05/23/24 1240          Safety Issues, Functional Mobility    Impairments Affecting Function (Mobility) balance;pain;range of motion (ROM);strength  -       Row Name 05/23/24 1240          Balance    Balance Assessment standing dynamic balance  -     Dynamic Standing Balance contact guard  -     Position/Device Used, Standing Balance walker, front-wheeled  -       Row Name 05/23/24 1240          Motor Skills    Therapeutic Exercise knee;hip;ankle  -       Row Name 05/23/24 1240          Hip (Therapeutic Exercise)    Hip (Therapeutic Exercise) isometric exercises  -     Hip Isometrics (Therapeutic Exercise) right;gluteal sets;10 repetitions;2 sets  -       Row Name 05/23/24 1240          Knee (Therapeutic Exercise)    Knee (Therapeutic Exercise) isometric exercises;strengthening exercise   -RH     Knee Isometrics (Therapeutic Exercise) right;quad sets;10 repetitions;2 sets  -RH     Knee Strengthening (Therapeutic Exercise) right;heel slides;SLR (straight leg raise);SAQ (short arc quad);LAQ (long arc quad);10 repetitions;2 sets  -RH       Row Name 05/23/24 1240          Ankle (Therapeutic Exercise)    Ankle (Therapeutic Exercise) AROM (active range of motion)  -     Ankle AROM (Therapeutic Exercise) right;dorsiflexion;plantarflexion;10 repetitions;2 sets  -       Row Name             Wound 05/22/24 0910 Right anterior knee Incision    Wound - Properties Group Placement Date: 05/22/24  -AS Placement Time: 0910  -AS Present on Original Admission: N  -AS Side: Right  -AS Orientation: anterior  -AS Location: knee  -AS Primary Wound Type: Incision  -AS    Retired Wound - Properties Group Placement Date: 05/22/24  -AS Placement Time: 0910  -AS Present on Original Admission: N  -AS Side: Right  -AS Orientation: anterior  -AS Location: knee  -AS Primary Wound Type: Incision  -AS    Retired Wound - Properties Group Date first assessed: 05/22/24  -AS Time first assessed: 0910  -AS Present on Original Admission: N  -AS Side: Right  -AS Location: knee  -AS Primary Wound Type: Incision  -AS      Row Name             Wound 05/22/24 0910 Right proximal leg Incision    Wound - Properties Group Placement Date: 05/22/24  -AS Placement Time: 0910  -AS Present on Original Admission: N  -AS Side: Right  -AS Orientation: proximal  -AS Location: leg  -AS Primary Wound Type: Incision  -AS    Retired Wound - Properties Group Placement Date: 05/22/24  -AS Placement Time: 0910  -AS Present on Original Admission: N  -AS Side: Right  -AS Orientation: proximal  -AS Location: leg  -AS Primary Wound Type: Incision  -AS    Retired Wound - Properties Group Date first assessed: 05/22/24  -AS Time first assessed: 0910  -AS Present on Original Admission: N  -AS Side: Right  -AS Location: leg  -AS Primary Wound Type: Incision  -AS       Row Name 05/23/24 1459          Vital Signs    O2 Delivery Intra Treatment room air  -       Row Name 05/23/24 1459 05/23/24 1240       Positioning and Restraints    In Chair reclined;call light within reach;encouraged to call for assist;exit alarm on  -RH reclined;call light within reach;encouraged to call for assist;exit alarm on;with family/caregiver  -      Row Name 05/23/24 1240          Progress Summary (PT)    Progress Toward Functional Goals (PT) progress toward functional goals is good  -     Daily Progress Summary (PT) Pt is progressing well with their exercise program.  Will continue current plan of care.  -               User Key  (r) = Recorded By, (t) = Taken By, (c) = Cosigned By      Initials Name Provider Type    RH Dax Gutierrez PTA Physical Therapist Assistant    AS Patricia Bull RN Registered Nurse                Right Hip Ther -ex   Exercise  Reps  Sets    Long arc quads   10 2   Short arc quads  10 2   Heel slides  10 2   Ankle pumps  10 2   Quad sets  10 2   Glut sets  10 2   Abduction/ Adduction  10 2            PT Recommendation and Plan     Progress Summary (PT)  Progress Toward Functional Goals (PT): progress toward functional goals is good  Daily Progress Summary (PT): Pt is progressing well with their exercise program.  Will continue current plan of care.   Outcome Measures       Row Name 05/23/24 1200 05/22/24 1409 05/22/24 1100       How much help from another person do you currently need...    Turning from your back to your side while in flat bed without using bedrails? 4  -RH 4  -GEENA 3  -GEENA    Moving from lying on back to sitting on the side of a flat bed without bedrails? 3  -RH 3  -GEENA 3  -GEENA    Moving to and from a bed to a chair (including a wheelchair)? 3  -RH 3  -GEENA 3  -GEENA    Standing up from a chair using your arms (e.g., wheelchair, bedside chair)? 3  -RH 3  -GEENA 3  -GEENA    Climbing 3-5 steps with a railing? 3  -RH 2  -GEENA 3  -GEENA    To walk in hospital room? 3   -RH 3  -GEENA 3  -GEENA    AM-PAC 6 Clicks Score (PT) 19  -RH 18  -GEENA 18  -GEENA    Highest Level of Mobility Goal 6 --> Walk 10 steps or more  -RH 6 --> Walk 10 steps or more  -GEENA 6 --> Walk 10 steps or more  -GEENA       Functional Assessment    Outcome Measure Options -- AM-PAC 6 Clicks Basic Mobility (PT)  -GEENA AM-PAC 6 Clicks Basic Mobility (PT)  -GEENA              User Key  (r) = Recorded By, (t) = Taken By, (c) = Cosigned By      Initials Name Provider Type     Dax Gutierrez PTA Physical Therapist Assistant    Waqas Andrews, PT Physical Therapist                     Time Calculation:    PT Charges       Row Name 05/23/24 1458 05/23/24 1237          Time Calculation    PT Received On 05/23/24  -RH 05/23/24  -RH        Timed Charges    47414 - PT Therapeutic Exercise Minutes 23  -RH --     96260 - Gait Training Minutes  -- 7  -RH     39809 - PT Therapeutic Activity Minutes -- 5  -RH        Untimed Charges    PT Group Therapy Minutes -- 30  -RH        Total Minutes    Timed Charges Total Minutes 23  -RH 12  -RH     Untimed Charges Total Minutes -- 30  -RH      Total Minutes 23  -RH 42  -RH               User Key  (r) = Recorded By, (t) = Taken By, (c) = Cosigned By      Initials Name Provider Type     Dax Gutierrez PTA Physical Therapist Assistant                  Therapy Charges for Today       Code Description Service Date Service Provider Modifiers Qty    00709075068 HC GAIT TRAINING EA 15 MIN 5/23/2024 Dax Gutierrez, PTA GP 1    57262658205 HC PT THER PROC GROUP 5/23/2024 Dax Gutierrez PTA GP 1    87585772435 HC PT THER PROC EA 15 MIN 5/23/2024 Dax Gutierrez PTA GP 2            PT G-Codes  Outcome Measure Options: AM-PAC 6 Clicks Daily Activity (OT)  AM-PAC 6 Clicks Score (PT): 19  AM-PAC 6 Clicks Score (OT): 21    Dax Gutierrez PTA  5/23/2024

## 2024-05-23 NOTE — PROGRESS NOTES
[facility]   Hospitalist Progress Note  Date: 2024  Patient Name: Jewels Beltrán  : 1965  MRN: [id number]  Date of admission: 2024      Subjective   Subjective     Chief Complaint: Knee pain    Interval Followup: No acute events overnight.  Has had some intermittent pain but states it is tolerable.  Working well with physical therapy.  Blood pressures acceptable.    Objective   Objective     Vitals:   Temp:  [97.2 °F (36.2 °C)-98.8 °F (37.1 °C)] 98.8 °F (37.1 °C)  Heart Rate:  [56-91] 56  Resp:  [12-20] 18  BP: ()/(54-79) 136/71  Flow (L/min):  [0-3] 0  FiO2 (%):  [53 %-100 %] 85 %  Physical Exam    Constitutional: Awake, alert, no acute distress   Respiratory: Clear to auscultation bilaterally, nonlabored respirations    Cardiovascular: RRR, no murmurs, rubs, or gallops   Gastrointestinal: Positive bowel sounds, soft, nontender, nondistended   Neurologic: Oriented x 3, expected postop ROM, strength symmetric in all extremities, Cranial Nerves grossly intact to confrontation, speech clear    Result Review    I have personally reviewed the results below:  [x]  Laboratory personally reviewed H&H  []  Microbiology  []  Radiology  []  EKG/Telemetry   []  Cardiology/Vascular   []  Pathology  []  Old records  []  Other:  CBC          4/10/2024    10:26 5/15/2024    09:26 2024    04:02   CBC   WBC 8.84  8.73     RBC 5.03  4.74     Hemoglobin 14.6  14.2  12.2    Hematocrit 45.0  42.5  37.6    MCV 89.5  89.7     MCH 29.0  30.0     MCHC 32.4  33.4     RDW 13.2  13.2     Platelets 309  284       CMP          2024    06:35 4/10/2024    10:26 5/15/2024    09:26   CMP   Glucose 96  102  167    BUN 19  18  15    Creatinine 0.85  0.87  0.87    EGFR 78.5  76.4  76.4    Sodium 144  137  136    Potassium 4.0  4.0  3.7    Chloride 106  102  100    Calcium 10.3  10.3  10.0    Total Protein 6.8  7.2  7.2    Albumin 4.4  4.4  4.3    Globulin 2.4  2.8  2.9    Total Bilirubin 0.6  0.7  0.7     Alkaline Phosphatase 71  81  84    AST (SGOT) 17  21  18    ALT (SGPT) 29  28  20    Albumin/Globulin Ratio 1.8  1.6  1.5    BUN/Creatinine Ratio 22.4  20.7  17.2    Anion Gap 12.0  11.5  9.9        Assessment & Plan   Assessment / Plan   Right knee osteoarthritis status post total knee replacement  Hypertension     Orthopedic surgery following  Postoperative pain control per orthopedic surgery  Patient will be started on anticoagulation   Zofran if needed for nausea   When she restart her HCTZ and lisinopril at discharge, discussed with patient  Discussed orthopedic surgery, plan to discharge home this afternoon with outpatient PT     Discussed plan with RN, orthopedic surgery    DVT prophylaxis:  Medical and mechanical DVT prophylaxis orders are present.      CODE STATUS:   Level Of Support Discussed With: Patient  Code Status (Patient has no pulse and is not breathing): CPR (Attempt to Resuscitate)  Medical Interventions (Patient has pulse or is breathing): Full Support      Electronically signed by Gualberto Larkin MD, 05/23/24, 7:54 AM EDT.

## 2024-05-23 NOTE — THERAPY TREATMENT NOTE
Acute Care - Physical Therapy Treatment Note   Shweta     Patient Name: Jewels Beltrán  : 1965  MRN: [id number]  Today's Date: 2024      Visit Dx:     ICD-10-CM ICD-9-CM   1. Primary osteoarthritis of right knee  M17.11 715.16   2. Difficulty in walking  R26.2 719.7   3. Primary localized osteoarthritis of right knee  M17.11 715.16   4. Decreased activities of daily living (ADL)  Z78.9 V49.89     Patient Active Problem List   Diagnosis    Hypertension    Smoker    Anxiety    Abdominal pain, right lower quadrant    Abscess of female pelvis    Arthritis    Thyroid disorder    Paresthesia of left arm    Abdominal discomfort    New onset headache    Carpal tunnel syndrome of left wrist    Snoring    Non-restorative sleep    Class 3 severe obesity with serious comorbidity and body mass index (BMI) of 40.0 to 44.9 in adult    Screening for malignant neoplasm of colon    History of colonic polyps    Primary localized osteoarthritis of right knee    Primary osteoarthritis of right knee     Past Medical History:   Diagnosis Date    Abnormal EKG 04/10/2024    EVAL W/CARDIOLOGY-NORMAL  STRESS TEST, NO CP OR SOA    Anxiety     Arthritis     Hypertension     Screening for malignant neoplasm of colon 2024     Past Surgical History:   Procedure Laterality Date    COLONOSCOPY      LAPAROSCOPIC TUBAL LIGATION      TOTAL KNEE ARTHROPLASTY Right 2024    Procedure: RIGHT TOTAL KNEE ARTHROPLASTY WITH LYNN ROBOT;  Surgeon: Braldey Bal MD;  Location: [facility];  Service: Robotics - Ortho;  Laterality: Right;     PT Assessment (Last 12 Hours)       PT Evaluation and Treatment       Row Name 24 1240          Physical Therapy Time and Intention    Subjective Information complains of;weakness;pain  -RH     Document Type therapy note (daily note)  -RH     Mode of Treatment physical therapy;group therapy;individual therapy  -RH     Patient Effort good  -RH     Comment Gait training performed  individually; therapeutic exercises performed in a group setting with 4 participants  -[facility] Name 05/23/24 1240          General Information    Patient Observations alert;cooperative;agree to therapy  -RH       Row Name 05/23/24 1240          Pain    Additional Documentation Pain Scale: FACES Pre/Post-Treatment (Group)  -RH       Row Name 05/23/24 1240          Pain Scale: FACES Pre/Post-Treatment    Pain: FACES Scale, Pretreatment 2-->hurts little bit  -RH     Posttreatment Pain Rating 2-->hurts little bit  -RH     Pain Location - Side/Orientation Right  -     Pain Location - knee  -RH       Row Name 05/23/24 1240          Range of Motion (ROM)    Range of Motion --  Pt R knee AAROM at 100 degrees flex and 8 degrees ext.  -RH       Row Name 05/23/24 1240          Strength (Manual Muscle Testing)    Strength (Manual Muscle Testing) --  Pt R knee ext strength at 2/5.  -RH       Row Name 05/23/24 1240          Mobility    Extremity Weight-bearing Status right lower extremity  -     Right Lower Extremity (Weight-bearing Status) weight-bearing as tolerated (WBAT)  -RH       Row Name 05/23/24 1240          Transfers    Transfers sit-stand transfer;stand-sit transfer  -RH       Row Name 05/23/24 1240          Sit-Stand Transfer    Sit-Stand Laverne (Transfers) contact guard  -     Assistive Device (Sit-Stand Transfers) walker, front-wheeled  -RH       Row Name 05/23/24 1240          Stand-Sit Transfer    Stand-Sit Laverne (Transfers) contact guard  -RH     Assistive Device (Stand-Sit Transfers) walker, front-wheeled  -RH       Row Name 05/23/24 1240          Gait/Stairs (Locomotion)    Gait/Stairs Locomotion gait/ambulation assistive device  -     Laverne Level (Gait) contact guard  -     Assistive Device (Gait) walker, front-wheeled  -     Patient was able to Ambulate yes  -RH     Distance in Feet (Gait) 50  -RH     Pattern (Gait) 3-point;step-through  -RH     Deviations/Abnormal  Patterns (Gait) gait speed decreased;stride length decreased  -     Right Sided Gait Deviations heel strike decreased;knee buckling, right side  -     Gait Assessment/Intervention Pt amb with RW and CGA with short measured steps to help minimize R knee buckling.  Pt amb with decreased gait speed, stride length, and RLE heel strike.  -     Negotiation (Stairs) stairs independence;stairs assistive device;handrail location;number of steps;ascending technique;descending technique  -     [place] Level (Stairs) contact guard;minimum assist (75% patient effort)  -     Handrail Location (Stairs) both sides  -     Number of Steps (Stairs) 5 x 2  -RH     Ascending Technique (Stairs) step-to-step  -RH     Descending Technique (Stairs) step-to-step  -RH     Stairs, Safety Issues --  Pt R knee partially buckled x 1 with pt attempting to advance her LLE up to the first step.  -       Row Name 05/23/24 1240          Safety Issues, Functional Mobility    Impairments Affecting Function (Mobility) balance;pain;range of motion (ROM);strength  -       Row Name 05/23/24 1240          Balance    Balance Assessment standing dynamic balance  -     Dynamic Standing Balance contact guard  -     Position/Device Used, Standing Balance walker, front-wheeled  -       Row Name 05/23/24 1240          Motor Skills    Therapeutic Exercise knee;hip;ankle  -       Row Name 05/23/24 1240          Hip (Therapeutic Exercise)    Hip (Therapeutic Exercise) isometric exercises  -     Hip Isometrics (Therapeutic Exercise) right;gluteal sets;10 repetitions;2 sets  -       Row Name 05/23/24 1240          Knee (Therapeutic Exercise)    Knee (Therapeutic Exercise) isometric exercises;strengthening exercise  -     Knee Isometrics (Therapeutic Exercise) right;quad sets;10 repetitions;2 sets  -     Knee Strengthening (Therapeutic Exercise) right;heel slides;SLR (straight leg raise);SAQ (short arc quad);LAQ (long arc quad);10  repetitions;2 sets  -       Row Name 05/23/24 1240          Ankle (Therapeutic Exercise)    Ankle (Therapeutic Exercise) AROM (active range of motion)  -     Ankle AROM (Therapeutic Exercise) right;dorsiflexion;plantarflexion;10 repetitions;2 sets  -RH       Row Name             Wound 05/22/24 0910 Right anterior knee Incision    Wound - Properties Group Placement Date: 05/22/24  -AS Placement Time: 0910  -AS Present on Original Admission: N  -AS Side: Right  -AS Orientation: anterior  -AS Location: knee  -AS Primary Wound Type: Incision  -AS    Retired Wound - Properties Group Placement Date: 05/22/24  -AS Placement Time: 0910  -AS Present on Original Admission: N  -AS Side: Right  -AS Orientation: anterior  -AS Location: knee  -AS Primary Wound Type: Incision  -AS    Retired Wound - Properties Group Date first assessed: 05/22/24  -AS Time first assessed: 0910  -AS Present on Original Admission: N  -AS Side: Right  -AS Location: knee  -AS Primary Wound Type: Incision  -AS      Row Name             Wound 05/22/24 0910 Right proximal leg Incision    Wound - Properties Group Placement Date: 05/22/24  -AS Placement Time: 0910  -AS Present on Original Admission: N  -AS Side: Right  -AS Orientation: proximal  -AS Location: leg  -AS Primary Wound Type: Incision  -AS    Retired Wound - Properties Group Placement Date: 05/22/24  -AS Placement Time: 0910  -AS Present on Original Admission: N  -AS Side: Right  -AS Orientation: proximal  -AS Location: leg  -AS Primary Wound Type: Incision  -AS    Retired Wound - Properties Group Date first assessed: 05/22/24  -AS Time first assessed: 0910  -AS Present on Original Admission: N  -AS Side: Right  -AS Location: leg  -AS Primary Wound Type: Incision  -AS      Row Name 05/23/24 1240          Positioning and Restraints    In Chair reclined;call light within reach;encouraged to call for assist;exit alarm on;with family/caregiver  -       Row Name 05/23/24 1240          Progress  Summary (PT)    Progress Toward Functional Goals (PT) progress toward functional goals is good  -     Daily Progress Summary (PT) Pt is progressing well with their exercise program.  Will continue current plan of care.  -               User Key  (r) = Recorded By, (t) = Taken By, (c) = Cosigned By      Initials Name Provider Type    RH Dax Gutierrez PTA Physical Therapist Assistant    AS Patricia Bull RN Registered Nurse                      PT Recommendation and Plan     Progress Summary (PT)  Progress Toward Functional Goals (PT): progress toward functional goals is good  Daily Progress Summary (PT): Pt is progressing well with their exercise program.  Will continue current plan of care.   Outcome Measures       Row Name 05/23/24 1200 05/22/24 1409 05/22/24 1100       How much help from another person do you currently need...    Turning from your back to your side while in flat bed without using bedrails? 4  -RH 4  -GEENA 3  -GEENA    Moving from lying on back to sitting on the side of a flat bed without bedrails? 3  -RH 3  -GEENA 3  -GEENA    Moving to and from a bed to a chair (including a wheelchair)? 3  -RH 3  -GEENA 3  -GEENA    Standing up from a chair using your arms (e.g., wheelchair, bedside chair)? 3  -RH 3  -GEENA 3  -GEENA    Climbing 3-5 steps with a railing? 3  -RH 2  -GEENA 3  -GEENA    To walk in hospital room? 3  -RH 3  -GEENA 3  -GEENA    AM-PAC 6 Clicks Score (PT) 19  -RH 18  -GEENA 18  -GEENA    Highest Level of Mobility Goal 6 --> Walk 10 steps or more  -RH 6 --> Walk 10 steps or more  -GEENA 6 --> Walk 10 steps or more  -GEENA       Functional Assessment    Outcome Measure Options -- AM-PAC 6 Clicks Basic Mobility (PT)  -GEENA AM-PAC 6 Clicks Basic Mobility (PT)  -GEENA              User Key  (r) = Recorded By, (t) = Taken By, (c) = Cosigned By      Initials Name Provider Type    RH Dax Gutierrez PTA Physical Therapist Assistant    Waqas Andrews, PT Physical Therapist                     Time Calculation:    PT Charges       Row  Name 05/23/24 1237             Time Calculation    PT Received On 05/23/24  -RH         Timed Charges    97839 - Gait Training Minutes  7  -RH      11718 - PT Therapeutic Activity Minutes 5  -RH         Untimed Charges    PT Group Therapy Minutes 30  -RH         Total Minutes    Timed Charges Total Minutes 12  -RH      Untimed Charges Total Minutes 30  -RH       Total Minutes 42  -RH                User Key  (r) = Recorded By, (t) = Taken By, (c) = Cosigned By      Initials Name Provider Type     Dax Gutierrez PTA Physical Therapist Assistant                  Therapy Charges for Today       Code Description Service Date Service Provider Modifiers Qty    33613103975 HC GAIT TRAINING EA 15 MIN 5/23/2024 Dax Gutierrez PTA GP 1    58355788982 HC PT THER PROC GROUP 5/23/2024 Dax Gutierrez PTA GP 1            PT G-Codes  Outcome Measure Options: AM-PAC 6 Clicks Basic Mobility (PT)  AM-PAC 6 Clicks Score (PT): 19    Dax Gutierrez PTA  5/23/2024

## 2024-05-23 NOTE — PLAN OF CARE
Goal Outcome Evaluation:  Plan of Care Reviewed With: patient           Outcome Evaluation: Patient has experienced decline in function from baseline status, presenting w/ deficits related to  that impede patient independence with activities of daily living.  Patient would benefit from skilled Occupational Therapy intervention to maxamize patient safety, and promote return to baseline independence.      Anticipated Discharge Disposition (OT): home with outpatient therapy services

## 2024-05-23 NOTE — PROGRESS NOTES
[facility]     Progress Note    Patient Name: Jewels Beltrán  : 1965  MRN: [id number]  Primary Care Physician:  Ashly Andrew APRN  Date of admission: 2024    Subjective   Subjective     HPI:  Patient Reports doing well this morning.  Her block has worn off.  She denies any chest pain or shortness of air.    Review of Systems   See HPI    Objective   Objective     Vitals:   Temp:  [97.2 °F (36.2 °C)-98.8 °F (37.1 °C)] 98.8 °F (37.1 °C)  Heart Rate:  [56-91] 56  Resp:  [12-20] 18  BP: ()/(54-79) 136/71  Flow (L/min):  [0-3] 0  FiO2 (%):  [53 %-100 %] 85 %  Physical Exam    General: Alert, no acute distress   Chest: Unlabored breathing, cardiovascular: Regular heart rate   Musculoskeletal: Dressing intact with minimal drainage.  Full extension.  Positive pulses.  Negative Juve.    Result Review      Hemoglobin   Date Value Ref Range Status   2024 12.2 12.0 - 15.9 g/dL Final     Hematocrit   Date Value Ref Range Status   2024 37.6 34.0 - 46.6 % Final        Result Review:  I have personally reviewed the results from the time of this admission to 2024 07:40 EDT and agree with these findings:  [x]  Laboratory  []  Microbiology  [x]  Radiology  []  EKG/Telemetry   []  Cardiology/Vascular   []  Pathology  []  Old records  []  Other:      Assessment & Plan   Assessment / Plan     Brief Patient Summary:  Jewels Beltrán is a 58 y.o. female who is status post right total knee replacement    Active Hospital Problems:  Active Hospital Problems    Diagnosis     **Primary osteoarthritis of right knee     Primary localized osteoarthritis of right knee      Plan: Weightbearing as tolerated with walker  Physical and Occupational Therapy  Pain control  DVT prophylaxis  Discharge planning: Home with outpatient therapy       DVT prophylaxis:  Medical and mechanical DVT prophylaxis orders are present.        CODE STATUS:   Level Of Support Discussed With: Patient  Code Status  (Patient has no pulse and is not breathing): CPR (Attempt to Resuscitate)  Medical Interventions (Patient has pulse or is breathing): Full Support    Disposition:  I expect patient to be discharged when medically able.    Electronically signed by Bradley Bal MD, 05/23/24, 7:40 AM EDT.

## 2024-05-23 NOTE — PLAN OF CARE
Goal Outcome Evaluation:      Pt is alert and oriented X 4, on room air, and X 1 assist with walker. All scheduled medications given as ordered and pain managed with PRN medications. Safety checks maintained throughout shift with pt resting in chair, wheels to chair locked, and personal items and call light within reach. Pt participated in both sessions of PT. Pt will follow up with outpatient PT at  PT starting 05/24/24 at 10 am. Educated pt on discharge instructions to include follow up appointments, signs and symptoms of infection, pain management, and if any questions or concerns arise to call the orthopedic office. Pt verbalized understanding.

## 2024-05-24 ENCOUNTER — TREATMENT (OUTPATIENT)
Dept: PHYSICAL THERAPY | Facility: CLINIC | Age: 59
End: 2024-05-24
Payer: COMMERCIAL

## 2024-05-24 DIAGNOSIS — M25.661 KNEE STIFFNESS, RIGHT: ICD-10-CM

## 2024-05-24 DIAGNOSIS — Z96.651 S/P TOTAL KNEE ARTHROPLASTY, RIGHT: Primary | ICD-10-CM

## 2024-05-24 DIAGNOSIS — R26.2 DIFFICULTY WALKING: ICD-10-CM

## 2024-05-24 DIAGNOSIS — M25.561 ACUTE PAIN OF RIGHT KNEE: ICD-10-CM

## 2024-05-24 PROCEDURE — 97161 PT EVAL LOW COMPLEX 20 MIN: CPT | Performed by: PHYSICAL THERAPIST

## 2024-05-24 PROCEDURE — 97110 THERAPEUTIC EXERCISES: CPT | Performed by: PHYSICAL THERAPIST

## 2024-05-24 PROCEDURE — 97140 MANUAL THERAPY 1/> REGIONS: CPT | Performed by: PHYSICAL THERAPIST

## 2024-05-24 NOTE — PROGRESS NOTES
Physical Therapy Initial Evaluation and Plan of Care                    [facility] [address] [place] [address]    Patient: Jewels Beltrán   : 1965  Diagnosis/ICD-10 Code:  S/P total knee arthroplasty, right [Z96.651]  Referring practitioner: Bradley Bal MD  Date of Initial Visit: 2024  Today's Date: 2024  Patient seen for 1 sessions           Subjective Questionnaire: LEFS: 15/80 = 18.75% Function      Subjective Evaluation    History of Present Illness  Mechanism of injury: The patient presents to physical therapy s/p right total knee arthroplasty on 24. She stayed one night in the hospital and was discharged yesterday. Her pain is located more in the back of the knee than in the front. Her pain is increased with bending/straightening her knee and with standing/walking. She denies any n/t in her right lower extremity. She is ambulating with a rolling walker.       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         Objective          Observations     Additional Knee Observation Details  Mild redness laterally in right knee. No obvious signs of infection or DVT.    Tenderness     Additional Tenderness Details  Tenderness in right knee over medial/lateral joint lines and in hamstring/calf attachments at knee level.    Neurological Testing     Additional Neurological Details  Sensation to light touch intact and equal bilaterally from L2-S1 dermatomal pattern except for hyposensation in medial shin on right.    Active Range of Motion     Right Knee   Flexion: 96 degrees with pain  Extension: 5 (lacking) degrees with pain    Passive Range of Motion     Right Knee   Flexion: 100 degrees with pain  Extension: 2 (lacking) degrees with pain    Strength/Myotome Testing     Right  Hip   Planes of Motion   Flexion: 4-    Left Knee   Flexion: 5  Extension: 5  Quadriceps contraction: good    Right Knee   Flexion: 4  Extension: 4-  Quadriceps contraction: fair    Ambulation     Ambulation: Level Surfaces     Additional Level Surfaces Ambulation Details  The patient ambulates with a rolling walker with slightly decreased stance time on the right lower extremity.      See Exercise, Manual, and Modality Logs for complete treatment.     Assessment & Plan       Assessment  Impairments: abnormal gait, abnormal muscle firing, abnormal or restricted ROM, activity intolerance, impaired balance, impaired physical strength, lacks appropriate home exercise program, pain with function, safety issue and weight-bearing intolerance   Functional limitations: walking, uncomfortable because of pain, moving in bed, standing and stooping   Assessment details: The patient presents to physical therapy with complaints of right knee pain s/p right TKA on 5/22/24. The patient presents with associated right knee weakness, stiffness, antalgic gait, and functional deficits (LEFS). The patient would benefit from skilled PT intervention to address the above mentioned functional limitations to allow the patient to return to her prior level of function.   Prognosis: good    Goals  Plan Goals: KNEE PROBLEMS    1. The patient has limited ROM of the right knee.   LTG 1:12 weeks:  The patient will demonstrate 0 to 120 degrees of ROM for the right knee in order to allow patient to ambulate and perform mobility related ADLs.    STATUS:  New   STG 1a: 6 weeks:  The patient will demonstrate 5 to 105 degrees of ROM for the right knee.    STATUS:  New    2. The patient has limited strength of the right knee.   LTG 2: 12 weeks: The patient will demonstrate 5 /5 strength for right knee flexion and extension in order to allow patient improved joint stability    STATUS:  New   STG 2a: 6 weeks: The patient will demonstrate 4+ /5 strength for  right knee flexion and extension    STATUS:  New      3. The patient has gait dysfunction.   LTG 3: 12 weeks:  The patient will ambulate without assistive device, independently, for community distances with normal biomechanics of the right lower extremity in order to improve mobility and allow patient to perform activities such as grocery shopping with greater ease.    STATUS:  New   STG 3a: 4 weeks: The patient will ambulate with a single tipped cane with appropriate gait mechanics.    STATUS:  New    4. Mobility: Walking/Moving Around Functional Limitation     LTG 4: 12 weeks:  The patient will demonstrate 25 % limitation by achieving a score of 60/80 on the LEFS.    STATUS:  New   STG 4 a: 6 weeks:  The patient will demonstrate 50 % limitation by achieving a score of 40/80 on the LEFS.      STATUS:  New     Plan  Therapy options: will be seen for skilled therapy services  Planned modality interventions: cryotherapy, electrical stimulation/Russian stimulation and TENS  Planned therapy interventions: balance/weight-bearing training, ADL retraining, soft tissue mobilization, strengthening, stretching, therapeutic activities, joint mobilization, home exercise program, gait training, functional ROM exercises, flexibility, body mechanics training, postural training, neuromuscular re-education and manual therapy  Frequency: 3x week  Duration in weeks: 12  Treatment plan discussed with: patient        Visit Diagnoses:    ICD-10-CM ICD-9-CM   1. S/P total knee arthroplasty, right  Z96.651 V43.65   2. Acute pain of right knee  M25.561 719.46   3. Knee stiffness, right  M25.661 719.56   4. Difficulty walking  R26.2 719.7       History # of Personal Factors and/or Comorbidities: LOW (0)  Examination of Body System(s): # of elements: LOW (1-2)  Clinical Presentation: STABLE   Clinical Decision Making: LOW       Timed:         Manual Therapy:    8     mins  15966;     Therapeutic Exercise:    8     mins  26969;      Neuromuscular Stephen:    0    mins  73165;    Therapeutic Activity:     0     mins  74414;     Gait Trainin     mins  61603;     Ultrasound:     0     mins  24175;    Ionto                               0    mins   87082  Self Care                       0     mins   25846  Canalith Repos    0     mins 42981      Un-Timed:  Electrical Stimulation:    0     mins  89513 ( );  Dry Needling     0     mins self-pay  Traction     0     mins 07964  Low Eval     20     Mins  28032  Mod Eval     0     Mins  45664  High Eval                       0     Mins  92687  Re-Eval                           0    mins  49987    Timed Treatment:   16   mins   Total Treatment:     36   mins    PT SIGNATURE: Aldo Barros PT    Electronically signed 2024    [place] License: PT - [id number]      Initial Certification  Certification Period: 2024 thru 2024  I certify that the therapy services are furnished while this patient is under my care.  The services outlined above are required by this patient, and will be reviewed every 90 days.     PHYSICIAN: Bradley Bal MD  NPI: [id number]      DATE:     Please sign and return via fax to [phone]. Thank you, [facility] Physical Therapy.

## 2024-05-25 DIAGNOSIS — M17.11 PRIMARY LOCALIZED OSTEOARTHRITIS OF RIGHT KNEE: ICD-10-CM

## 2024-05-25 RX ORDER — HYDROCODONE BITARTRATE AND ACETAMINOPHEN 7.5; 325 MG/1; MG/1
1-2 TABLET ORAL EVERY 4 HOURS PRN
Qty: 42 TABLET | Refills: 0 | Status: SHIPPED | OUTPATIENT
Start: 2024-05-25

## 2024-05-28 ENCOUNTER — TREATMENT (OUTPATIENT)
Dept: PHYSICAL THERAPY | Facility: CLINIC | Age: 59
End: 2024-05-28
Payer: COMMERCIAL

## 2024-05-28 ENCOUNTER — TELEPHONE (OUTPATIENT)
Dept: ORTHOPEDIC SURGERY | Facility: CLINIC | Age: 59
End: 2024-05-28
Payer: COMMERCIAL

## 2024-05-28 DIAGNOSIS — M25.561 ACUTE PAIN OF RIGHT KNEE: ICD-10-CM

## 2024-05-28 DIAGNOSIS — Z96.651 S/P TOTAL KNEE ARTHROPLASTY, RIGHT: Primary | ICD-10-CM

## 2024-05-28 DIAGNOSIS — M17.11 PRIMARY LOCALIZED OSTEOARTHRITIS OF RIGHT KNEE: ICD-10-CM

## 2024-05-28 DIAGNOSIS — M25.661 KNEE STIFFNESS, RIGHT: ICD-10-CM

## 2024-05-28 DIAGNOSIS — R26.2 DIFFICULTY WALKING: ICD-10-CM

## 2024-05-28 PROCEDURE — 97140 MANUAL THERAPY 1/> REGIONS: CPT | Performed by: PHYSICAL THERAPIST

## 2024-05-28 PROCEDURE — 97530 THERAPEUTIC ACTIVITIES: CPT | Performed by: PHYSICAL THERAPIST

## 2024-05-28 PROCEDURE — 97110 THERAPEUTIC EXERCISES: CPT | Performed by: PHYSICAL THERAPIST

## 2024-05-28 NOTE — PROGRESS NOTES
Physical Therapy Daily Treatment Note  [facility] [address] [place] [address]    Patient: Jewels Beltrán   : 1965  Referring practitioner: No ref. provider found  Date of Initial Visit: Type: THERAPY  Noted: 2024  Today's Date: 2024  Patient seen for 2 sessions           Subjective  Jewels Beltrán reports: pain at arrival 5/10. She did take pain medication about a half hour prior to therapy.    Objective   Hooklying R knee active flexion to 110, extension to 2. 0 degrees extension after elevation and MLD    See Exercise, Manual, and Modality Logs for complete treatment.     Assessment/Plan  Nusrat is just beginning therapy as outlined to care for her recovery following R TKA. Continue as in POC.     Visit Diagnoses:    ICD-10-CM ICD-9-CM   1. S/P total knee arthroplasty, right  Z96.651 V43.65   2. Acute pain of right knee  M25.561 719.46   3. Knee stiffness, right  M25.661 719.56   4. Difficulty walking  R26.2 719.7       Progress per Plan of Care and Progress strengthening /stabilization /functional activity         Timed:  Manual Therapy:    15     mins  67027;  Therapeutic Exercise:   8    mins  82852;     Neuromuscular Stephen:        mins  65436;    Therapeutic Activity:     23     mins  61438;     Gait Training:           mins  10717;     Ultrasound:          mins  12878;    Electrical Stimulation:         mins  50007 ( );  Aquatics  __   mins   40517    Untimed:  Electrical Stimulation:         mins  02495 ( );  Mechanical Traction:         mins  59728;   Ice   10 mins No charge    Timed Treatment:   46   mins   Total Treatment:     56   mins    Electronically Signed:  Yazmin Araujo PTA  Physical Therapist Assistant    [place] PTA license [id number]

## 2024-05-29 RX ORDER — HYDROCODONE BITARTRATE AND ACETAMINOPHEN 7.5; 325 MG/1; MG/1
1-2 TABLET ORAL EVERY 4 HOURS PRN
Qty: 42 TABLET | Refills: 0 | Status: SHIPPED | OUTPATIENT
Start: 2024-05-31

## 2024-05-31 ENCOUNTER — TREATMENT (OUTPATIENT)
Dept: PHYSICAL THERAPY | Facility: CLINIC | Age: 59
End: 2024-05-31
Payer: COMMERCIAL

## 2024-05-31 DIAGNOSIS — R26.2 DIFFICULTY WALKING: ICD-10-CM

## 2024-05-31 DIAGNOSIS — Z96.651 S/P TOTAL KNEE ARTHROPLASTY, RIGHT: Primary | ICD-10-CM

## 2024-05-31 DIAGNOSIS — M25.661 KNEE STIFFNESS, RIGHT: ICD-10-CM

## 2024-05-31 DIAGNOSIS — M25.561 ACUTE PAIN OF RIGHT KNEE: ICD-10-CM

## 2024-05-31 NOTE — PROGRESS NOTES
Physical Therapy Daily Treatment Note      Patient: Jewels Beltrán   : 1965  Referring practitioner: Bradley Bal MD  Date of Initial Visit: Type: THERAPY  Noted: 2024  Today's Date: 2024  Patient seen for 3 sessions           Subjective Questionnaire:       Subjective Evaluation    History of Present Illness    Subjective comment: Pt reported R knee pain 4/10.       Objective          Active Range of Motion     Right Knee   Flexion: 102 degrees     Passive Range of Motion     Right Knee   Flexion: 106 degrees       See Exercise, Manual, and Modality Logs for complete treatment.       Assessment & Plan       Assessment  Assessment details: Pt ambulated with RW and good heal to toe gait.  Pt was wearing JOSHUA hose in B feet.  Pt tolerated strengthening well with report of increased pain with SLR.  Pt tolerated passive stretching well.  Pt will benefit from continued PT.        Visit Diagnoses:    ICD-10-CM ICD-9-CM   1. S/P total knee arthroplasty, right  Z96.651 V43.65   2. Acute pain of right knee  M25.561 719.46   3. Knee stiffness, right  M25.661 719.56   4. Difficulty walking  R26.2 719.7       Progress per Plan of Care and Progress strengthening /stabilization /functional activity           Timed:  Manual Therapy:   15      mins  30861;  Therapeutic Exercise:    15     mins  59128;     Neuromuscular Stephen:        mins  52381;    Therapeutic Activity:     10     mins  78559;     Gait Training:           mins  85449;     Ultrasound:          mins  77865;    Electrical Stimulation:         mins  12251 ( );  Aquatic Therapy          mins  38379    Untimed:  Electrical Stimulation:         mins  63173 ( );  Mechanical Traction:         mins  01970;     Timed Treatment:   40   mins   Total Treatment:     40   mins    Electronically signed    Viki White PTA  Physical Therapist Assistant    [facility] license: [id number]

## 2024-06-04 ENCOUNTER — TREATMENT (OUTPATIENT)
Dept: PHYSICAL THERAPY | Facility: CLINIC | Age: 59
End: 2024-06-04
Payer: COMMERCIAL

## 2024-06-04 ENCOUNTER — OFFICE VISIT (OUTPATIENT)
Dept: ORTHOPEDIC SURGERY | Facility: CLINIC | Age: 59
End: 2024-06-04
Payer: COMMERCIAL

## 2024-06-04 VITALS
BODY MASS INDEX: 39.37 KG/M2 | SYSTOLIC BLOOD PRESSURE: 116 MMHG | WEIGHT: 245 LBS | HEART RATE: 91 BPM | DIASTOLIC BLOOD PRESSURE: 81 MMHG | OXYGEN SATURATION: 95 % | HEIGHT: 66 IN

## 2024-06-04 DIAGNOSIS — Z47.89 AFTERCARE FOLLOWING SURGERY OF THE MUSCULOSKELETAL SYSTEM: Primary | ICD-10-CM

## 2024-06-04 DIAGNOSIS — M25.661 KNEE STIFFNESS, RIGHT: ICD-10-CM

## 2024-06-04 DIAGNOSIS — Z96.651 S/P TOTAL KNEE ARTHROPLASTY, RIGHT: Primary | ICD-10-CM

## 2024-06-04 DIAGNOSIS — M25.561 ACUTE PAIN OF RIGHT KNEE: ICD-10-CM

## 2024-06-04 DIAGNOSIS — R26.2 DIFFICULTY WALKING: ICD-10-CM

## 2024-06-04 PROCEDURE — 97110 THERAPEUTIC EXERCISES: CPT | Performed by: PHYSICAL THERAPIST

## 2024-06-04 PROCEDURE — 97530 THERAPEUTIC ACTIVITIES: CPT | Performed by: PHYSICAL THERAPIST

## 2024-06-04 PROCEDURE — 99024 POSTOP FOLLOW-UP VISIT: CPT | Performed by: PHYSICIAN ASSISTANT

## 2024-06-04 PROCEDURE — 97140 MANUAL THERAPY 1/> REGIONS: CPT | Performed by: PHYSICAL THERAPIST

## 2024-06-04 NOTE — PROGRESS NOTES
Chief Complaint  Follow-up of the Right Knee and Suture / Staple Removal    Subjective          History of Present Illness      Jewels Beltrán is a 58 y.o. female  presents to [facility] for     Patient presents with her son Tyrese for 2-week postoperative evaluation of right total knee arthroplasty 5/22/2024.  Patient states she is attending therapy at [facility], [place].  She is progressing well.  She states that she is taking pain meds and trying to use them sparingly she declines a refill today but states she will call for this.  She is also taking DVT prophylaxis and denies calf pain she does admit to some swelling of the knee and some resolving bruising.  She is ambulating with a walker.  Patient states the incision has been healing well and denies redness drainage or dehiscence.  She denies fever or chills, denies chest pain or shortness of air.      No Known Allergies     Social History     Socioeconomic History    Marital status:    Tobacco Use    Smoking status: Every Day     Current packs/day: 1.00     Average packs/day: 1 pack/day for 44.4 years (44.4 ttl pk-yrs)     Types: Cigarettes     Start date: 1/1/1980     Passive exposure: Current    Smokeless tobacco: Never    Tobacco comments:     SMOKED 21-30 YEARS     INST PER ANESTHESIA PROTOCOL          PT STATES SHE DID NOT SMOKE AM OF SURGERY    Vaping Use    Vaping status: Never Used   Substance and Sexual Activity    Alcohol use: Yes     Comment: DRINKS MONTHLY [employer]    Drug use: Never    Sexual activity: Defer        REVIEW OF SYSTEMS    Constitutional: Awake alert and oriented x3, no acute distress, denies fevers, chills, weight loss  Respiratory: No respiratory distress  Vascular: Brisk cap refill, Intact distal pulses, No cyanosis, compartments soft with no signs or symptoms of compartment syndrome or DVT.   Cardiovascular: Denies chest pain, shortness of breath  Skin: Denies rashes, acute skin  "changes  Neurologic: Denies headache, loss of consciousness  MSK: Right knee pain      Objective   Vital Signs:   /81   Pulse 91   Ht 167.6 cm (66\")   Wt 111 kg (245 lb)   SpO2 95%   BMI 39.54 kg/m²     Body mass index is 39.54 kg/m².    Physical Exam       Right knee: Incisions are healing well, no erythema, no drainage or dehiscence, no signs of infection, mild generalized swelling to the knee, resolving ecchymosis to the shin and foot/ankle, extension -5 flexion 100, stable to varus/valgus are stable anterior/posterior drawer, nontender calf, negative Juve testing.  Patient able hold straight leg raise      Procedures    Imaging Results (Most Recent)       Procedure Component Value Units Date/Time    XR Knee 3 View Right [[id number]] Resulted: 06/04/24 0906     Updated: 06/04/24 0907    Narrative:      View:AP/Lateral and [place] view(s)  Site: Right knee  Indication: Right knee pain  Study: X-rays ordered, taken in the office, and reviewed today  X-ray: Intact appearing right total knee arthroplasty, no signs of   hardware failure or loosening, no subsidence or periprosthetic fracture,   good alignment  Comparative data: Postoperative studies             Result Review :   The following data was reviewed by: ERIKA Lu on 06/04/2024:  Data reviewed : Radiologic studies reviewed by me with the patient              Assessment and Plan    Diagnoses and all orders for this visit:    1. Aftercare following surgery of right total knee arthroplasty 5/22/2024 (Primary)  -     XR Knee 3 View Right        Reviewed x-rays with the patient and her son discussed diagnosis and treatment options with them.  Patient and son were advised patient should continue therapy for strength and range of motion.  We discussed continuing pain meds and she will call for refill as needed.  She will continue/finish DVT prophylaxis.  Continue weightbearing and home exercises as tolerated.  Sutures/staples removed in " office today. Steri-strips applied. Discussed incision care/hygiene. May shower, but do not submerge in water until fully healed.  Advised patient that if any concerning symptoms regarding incision appearance occur that they should call us right away, patient expressed understanding.  Follow-up in 4 weeks for recheck    Call or return if worsening symptoms.    Follow Up   Return in about 4 weeks (around 7/2/2024) for Recheck.  Patient was given instructions and counseling regarding her condition or for health maintenance advice. Please see specific information pulled into the AVS if appropriate.       EMR Dragon/Transcription disclaimer:  Part of this note may be an electronic transcription/translation of spoken language to printed text using the Dragon Dictation System

## 2024-06-04 NOTE — PROGRESS NOTES
Physical Therapy Daily Treatment Note  [facility] [address] [place] [address]    Patient: Jewels Beltrán   : 1965  Referring practitioner: Bradley Bal MD  Date of Initial Visit: Type: THERAPY  Noted: 2024  Today's Date: 2024  Patient seen for 4 sessions           Subjective  Jewels Beltrán reports: her pain is 3/10. She informed PTA that she had staples removed this morning.  While on bike at apex of flexion, she reported feeling a burning sensation across incision.     Objective   See Exercise, Manual, and Modality Logs for complete treatment.     Assessment/Plan  Jewels is making good gains in her overall recovery. She is reporting less pain, advancing with her gait, and exercises. Ongoing care to attend to deficits remaining to attain best possible outcome following R TKA.    Visit Diagnoses:    ICD-10-CM ICD-9-CM   1. S/P total knee arthroplasty, right  Z96.651 V43.65   2. Acute pain of right knee  M25.561 719.46   3. Knee stiffness, right  M25.661 719.56   4. Difficulty walking  R26.2 719.7       Progress per Plan of Care and Progress strengthening /stabilization /functional activity         Timed:  Manual Therapy:   10      mins  11838;  Therapeutic Exercise:    10     mins  64748;     Neuromuscular Stephen:        mins  94265;    Therapeutic Activity:     10     mins  98617;     Gait Training:           mins  29922;     Ultrasound:          mins  77352;    Electrical Stimulation:         mins  47191 ( );  Aquatics  __   mins   92169    Untimed:  Electrical Stimulation:         mins  65903 ( );  Mechanical Traction:         mins  23316;     Timed Treatment:   30   mins   Total Treatment:     30   mins    Electronically Signed:  Yazmin Araujo PTA  Physical Therapist Assistant    [place] PTA license [id number]

## 2024-06-06 ENCOUNTER — TELEPHONE (OUTPATIENT)
Dept: ORTHOPEDIC SURGERY | Facility: CLINIC | Age: 59
End: 2024-06-06
Payer: COMMERCIAL

## 2024-06-06 ENCOUNTER — TREATMENT (OUTPATIENT)
Dept: PHYSICAL THERAPY | Facility: CLINIC | Age: 59
End: 2024-06-06
Payer: COMMERCIAL

## 2024-06-06 DIAGNOSIS — M25.561 ACUTE PAIN OF RIGHT KNEE: ICD-10-CM

## 2024-06-06 DIAGNOSIS — Z96.651 S/P TOTAL KNEE ARTHROPLASTY, RIGHT: Primary | ICD-10-CM

## 2024-06-06 DIAGNOSIS — M17.11 PRIMARY LOCALIZED OSTEOARTHRITIS OF RIGHT KNEE: ICD-10-CM

## 2024-06-06 DIAGNOSIS — R26.2 DIFFICULTY WALKING: ICD-10-CM

## 2024-06-06 DIAGNOSIS — M25.661 KNEE STIFFNESS, RIGHT: ICD-10-CM

## 2024-06-06 RX ORDER — HYDROCODONE BITARTRATE AND ACETAMINOPHEN 7.5; 325 MG/1; MG/1
1 TABLET ORAL EVERY 4 HOURS PRN
Qty: 42 TABLET | Refills: 0 | Status: SHIPPED | OUTPATIENT
Start: 2024-06-06

## 2024-06-06 NOTE — PROGRESS NOTES
Physical Therapy Daily Treatment Note                      [facility] [address] [place] [address]    Patient: Jewels Beltrán   : 1965  Diagnosis/ICD-10 Code:  S/P total knee arthroplasty, right [Z96.651]  Referring practitioner: Brdaley Bal MD  Date of Initial Visit: Type: THERAPY  Noted: 2024  Today's Date: 2024  Patient seen for 5 sessions           Subjective   The patient reported that her pain is elevated today. She is still taking pain medication prior to PT.    Objective   See Exercise, Manual, and Modality Logs for complete treatment.     Assessment/Plan  The patient demonstrated good right knee ROM today with passive stretching. She did experience increased pain with several exercises today. Continue to progress per patient tolerance.       Timed:  Manual Therapy:    8     mins  95481;  Therapeutic Exercise:    12     mins  94591;     Neuromuscular Stephen:   0    mins  07687;    Therapeutic Activity:     10     mins  00701;     Gait Trainin     mins  25560;     Aquatics                         0      mins  60236    Un-timed:  Mechanical Traction      0     mins  78641  Dry Needling     0     mins self-pay  Electrical Stimulation:    0     mins  43328 ( );      Timed Treatment:   30   mins   Total Treatment:     30   mins    Aldo Barros PT  Physical Therapist    Electronically signed 2024    [place] License: PT - [id number]

## 2024-06-10 ENCOUNTER — TREATMENT (OUTPATIENT)
Dept: PHYSICAL THERAPY | Facility: CLINIC | Age: 59
End: 2024-06-10
Payer: COMMERCIAL

## 2024-06-10 DIAGNOSIS — M25.661 KNEE STIFFNESS, RIGHT: ICD-10-CM

## 2024-06-10 DIAGNOSIS — Z96.651 S/P TOTAL KNEE ARTHROPLASTY, RIGHT: Primary | ICD-10-CM

## 2024-06-10 DIAGNOSIS — M25.561 ACUTE PAIN OF RIGHT KNEE: ICD-10-CM

## 2024-06-10 DIAGNOSIS — R26.2 DIFFICULTY WALKING: ICD-10-CM

## 2024-06-10 PROCEDURE — 97110 THERAPEUTIC EXERCISES: CPT | Performed by: PHYSICAL THERAPIST

## 2024-06-10 PROCEDURE — 97112 NEUROMUSCULAR REEDUCATION: CPT | Performed by: PHYSICAL THERAPIST

## 2024-06-10 PROCEDURE — 97530 THERAPEUTIC ACTIVITIES: CPT | Performed by: PHYSICAL THERAPIST

## 2024-06-10 PROCEDURE — 97140 MANUAL THERAPY 1/> REGIONS: CPT | Performed by: PHYSICAL THERAPIST

## 2024-06-10 NOTE — PROGRESS NOTES
Physical Therapy Daily Treatment Note  [facility] [address] [place] [address]    Patient: Jewels Beltrán   : 1965  Referring practitioner: Bradley Bal MD  Date of Initial Visit: Type: THERAPY  Noted: 2024  Today's Date: 6/10/2024  Patient seen for 6 sessions           Subjective  Jewels Beltrán reports:  pain at 2-3/10 at arrival. Jewels comments she feels stiffness and is still having the tightness in the posterior aspect.    Objective   On total gym, R knee flexion to 105 degrees. On step, stretching into flexion 110 degrees.    See Exercise, Manual, and Modality Logs for complete treatment.     Assessment/Plan  Jewels is advancing with ROM and gross strength as evident tracking on flow sheet. Ongoing need for therapist directed intervention to further her recovery of ROM, strength and function following R TKA.    Visit Diagnoses:    ICD-10-CM ICD-9-CM   1. S/P total knee arthroplasty, right  Z96.651 V43.65   2. Acute pain of right knee  M25.561 719.46   3. Knee stiffness, right  M25.661 719.56   4. Difficulty walking  R26.2 719.7       Progress per Plan of Care and Progress strengthening /stabilization /functional activity         Timed:  Manual Therapy:     8    mins  17257;  Therapeutic Exercise:    12     mins  57029;     Neuromuscular Stephen:    9    mins  27655;    Therapeutic Activity:     11     mins  45916;     Gait Training:           mins  20307;     Ultrasound:          mins  03078;    Electrical Stimulation:         mins  63607 ( );  Aquatics  __   mins   53654    Untimed:  Electrical Stimulation:         mins  86414 ( );  Mechanical Traction:         mins  95295;     Timed Treatment:   40   mins   Total Treatment:     40   mins    Electronically Signed:  Yazmin Araujo PTA  Physical Therapist Assistant    [place] PTA license [id number]

## 2024-06-13 ENCOUNTER — TREATMENT (OUTPATIENT)
Dept: PHYSICAL THERAPY | Facility: CLINIC | Age: 59
End: 2024-06-13
Payer: COMMERCIAL

## 2024-06-13 ENCOUNTER — TELEPHONE (OUTPATIENT)
Dept: ORTHOPEDIC SURGERY | Facility: CLINIC | Age: 59
End: 2024-06-13
Payer: COMMERCIAL

## 2024-06-13 DIAGNOSIS — Z96.651 S/P TOTAL KNEE ARTHROPLASTY, RIGHT: Primary | ICD-10-CM

## 2024-06-13 DIAGNOSIS — M25.561 ACUTE PAIN OF RIGHT KNEE: ICD-10-CM

## 2024-06-13 DIAGNOSIS — M17.11 PRIMARY LOCALIZED OSTEOARTHRITIS OF RIGHT KNEE: ICD-10-CM

## 2024-06-13 DIAGNOSIS — M25.661 KNEE STIFFNESS, RIGHT: ICD-10-CM

## 2024-06-13 DIAGNOSIS — R26.2 DIFFICULTY WALKING: ICD-10-CM

## 2024-06-13 RX ORDER — HYDROCODONE BITARTRATE AND ACETAMINOPHEN 7.5; 325 MG/1; MG/1
1 TABLET ORAL EVERY 4 HOURS PRN
Qty: 42 TABLET | Refills: 0 | Status: SHIPPED | OUTPATIENT
Start: 2024-06-13

## 2024-06-13 NOTE — PROGRESS NOTES
"Physical Therapy Daily Treatment Note  [facility] [address] [place] [address]    Patient: Jewels Beltrán   : 1965  Referring practitioner: Bradley Bal MD  Date of Initial Visit: Type: THERAPY  Noted: 2024  Today's Date: 2024  Patient seen for 7 sessions           Subjective  Jewels Beltrán reports: her pain is \"2ish.\"      Objective   See Exercise, Manual, and Modality Logs for complete treatment.     Assessment/Plan  Nusrat is making gains in gross strength, improved gait without AD and pain reduction. She did express some discouragement but she was told that she had a big surgery and her recovery will not be overnight. Continue as outlined to return to [facility].    Visit Diagnoses:    ICD-10-CM ICD-9-CM   1. S/P total knee arthroplasty, right  Z96.651 V43.65   2. Acute pain of right knee  M25.561 719.46   3. Knee stiffness, right  M25.661 719.56   4. Difficulty walking  R26.2 719.7       Progress per Plan of Care and Progress strengthening /stabilization /functional activity         Timed:  Manual Therapy:    8     mins  64429;  Therapeutic Exercise:    10     mins  88823;     Neuromuscular Stephen:        mins  51303;    Therapeutic Activity:     10     mins  04457;     Gait Training:           mins  94280;     Ultrasound:          mins  61472;    Electrical Stimulation:         mins  25101 ( );  Aquatics  __   mins   86007    Untimed:  Electrical Stimulation:         mins  68947 ( );  Mechanical Traction:         mins  80888;     Timed Treatment:   28   mins   Total Treatment:     28   mins    Electronically Signed:  Yazmin Araujo PTA  Physical Therapist Assistant    [place] PTA license [id number]            "

## 2024-06-18 ENCOUNTER — TREATMENT (OUTPATIENT)
Dept: PHYSICAL THERAPY | Facility: CLINIC | Age: 59
End: 2024-06-18
Payer: COMMERCIAL

## 2024-06-18 DIAGNOSIS — Z96.651 S/P TOTAL KNEE ARTHROPLASTY, RIGHT: Primary | ICD-10-CM

## 2024-06-18 DIAGNOSIS — R26.2 DIFFICULTY WALKING: ICD-10-CM

## 2024-06-18 DIAGNOSIS — M25.561 ACUTE PAIN OF RIGHT KNEE: ICD-10-CM

## 2024-06-18 DIAGNOSIS — M25.661 KNEE STIFFNESS, RIGHT: ICD-10-CM

## 2024-06-18 NOTE — PROGRESS NOTES
Physical Therapy Daily Treatment Note      Patient: Jewels Beltrán   : 1965  Referring practitioner: Bradley Bal MD  Date of Initial Visit: Type: THERAPY  Noted: 2024  Today's Date: 2024  Patient seen for 8 sessions           Subjective Questionnaire:       Subjective Evaluation    History of Present Illness    Subjective comment: Pt reports 3/10 R Knee pain and states knee is stiff.       Objective   See Exercise, Manual, and Modality Logs for complete treatment.       Assessment & Plan       Assessment  Assessment details: Pt ambulates with STC with a good heel to toe gait.  Pt reports extreme pain with passive R knee stretching flexion and extension.  Pt tolerated strengthening well.  Pt will benefit from continued PT to return to [facility].        Visit Diagnoses:    ICD-10-CM ICD-9-CM   1. S/P total knee arthroplasty, right  Z96.651 V43.65   2. Acute pain of right knee  M25.561 719.46   3. Knee stiffness, right  M25.661 719.56   4. Difficulty walking  R26.2 719.7       Progress per Plan of Care and Progress strengthening /stabilization /functional activity           Timed:  Manual Therapy:    8     mins  59108;  Therapeutic Exercise:    12     mins  10076;     Neuromuscular Stephen:        mins  39399;    Therapeutic Activity:     10     mins  82882;     Gait Training:           mins  72314;     Ultrasound:          mins  29264;    Electrical Stimulation:         mins  52579 ( );  Aquatic Therapy          mins  12421    Untimed:  Electrical Stimulation:         mins  68636 ( );  Mechanical Traction:         mins  79934;     Timed Treatment:   30   mins   Total Treatment:     30   mins    Electronically signed    Viki White PTA  Physical Therapist Assistant    [facility] license: [id number]

## 2024-06-20 ENCOUNTER — TREATMENT (OUTPATIENT)
Dept: PHYSICAL THERAPY | Facility: CLINIC | Age: 59
End: 2024-06-20
Payer: COMMERCIAL

## 2024-06-20 ENCOUNTER — TELEPHONE (OUTPATIENT)
Dept: ORTHOPEDIC SURGERY | Facility: CLINIC | Age: 59
End: 2024-06-20
Payer: COMMERCIAL

## 2024-06-20 DIAGNOSIS — R26.2 DIFFICULTY WALKING: ICD-10-CM

## 2024-06-20 DIAGNOSIS — M25.661 KNEE STIFFNESS, RIGHT: ICD-10-CM

## 2024-06-20 DIAGNOSIS — M17.11 PRIMARY LOCALIZED OSTEOARTHRITIS OF RIGHT KNEE: ICD-10-CM

## 2024-06-20 DIAGNOSIS — Z96.651 S/P TOTAL KNEE ARTHROPLASTY, RIGHT: Primary | ICD-10-CM

## 2024-06-20 DIAGNOSIS — M25.561 ACUTE PAIN OF RIGHT KNEE: ICD-10-CM

## 2024-06-20 RX ORDER — HYDROCODONE BITARTRATE AND ACETAMINOPHEN 7.5; 325 MG/1; MG/1
1 TABLET ORAL EVERY 4 HOURS PRN
Qty: 42 TABLET | Refills: 0 | Status: SHIPPED | OUTPATIENT
Start: 2024-06-20

## 2024-06-20 NOTE — PROGRESS NOTES
Physical Therapy Daily Treatment Note                      [facility] [address] [facility] [place] [address]    Patient: Jewels Beltrán   : 1965  Diagnosis/ICD-10 Code:  S/P total knee arthroplasty, right [Z96.651]  Referring practitioner: Bradley Bal MD  Date of Initial Visit: Type: THERAPY  Noted: 2024  Today's Date: 2024  Patient seen for 9 sessions           Subjective   The patient reported that her knee still hurts a lot with stretching. She has no new complaints.    Objective   See Exercise, Manual, and Modality Logs for complete treatment.     Assessment/Plan  The patient demonstrated good tolerance to all functional knee strengthening exercise today. Her ROM is progressing very well. Continue with current plan of care.       Timed:  Manual Therapy:    8     mins  10990;  Therapeutic Exercise:    20     mins  94173;     Neuromuscular Stephen:   0    mins  68234;    Therapeutic Activity:     10     mins  83867;     Gait Trainin     mins  32158;     Aquatics                         0      mins  32708    Un-timed:  Mechanical Traction      0     mins  31429  Dry Needling     0     mins self-pay  Electrical Stimulation:    0     mins  56902 ( );      Timed Treatment:   38   mins   Total Treatment:     38   mins    Aldo Barros PT  Physical Therapist    Electronically signed 2024    [place] License: PT - [id number]

## 2024-06-24 ENCOUNTER — TREATMENT (OUTPATIENT)
Dept: PHYSICAL THERAPY | Facility: CLINIC | Age: 59
End: 2024-06-24
Payer: COMMERCIAL

## 2024-06-24 DIAGNOSIS — R26.2 DIFFICULTY WALKING: ICD-10-CM

## 2024-06-24 DIAGNOSIS — M25.661 KNEE STIFFNESS, RIGHT: ICD-10-CM

## 2024-06-24 DIAGNOSIS — Z96.651 S/P TOTAL KNEE ARTHROPLASTY, RIGHT: Primary | ICD-10-CM

## 2024-06-24 DIAGNOSIS — M25.561 ACUTE PAIN OF RIGHT KNEE: ICD-10-CM

## 2024-06-24 PROCEDURE — 97110 THERAPEUTIC EXERCISES: CPT | Performed by: PHYSICAL THERAPIST

## 2024-06-24 PROCEDURE — 97140 MANUAL THERAPY 1/> REGIONS: CPT | Performed by: PHYSICAL THERAPIST

## 2024-06-24 PROCEDURE — 97530 THERAPEUTIC ACTIVITIES: CPT | Performed by: PHYSICAL THERAPIST

## 2024-06-24 NOTE — PROGRESS NOTES
Progress Note  [facility] [address] [place] [address]      Patient: Jewels Beltrán   : 1965  Diagnosis/ICD-10 Code:  S/P total knee arthroplasty, right [Z96.651]  Referring practitioner: Bradley Bal MD  Date of Initial Visit: Type: THERAPY  Noted: 2024  Today's Date: 2024  Patient seen for 10 sessions      Subjective:   Subjective Questionnaire: LEFS:  = 38.75% Function  Clinical Progress: improved  Home Program Compliance: Yes  Treatment has included: therapeutic exercise, neuromuscular re-education, manual therapy, therapeutic activity, and gait training    Subjective   The patient reported that her knee pain today is a 3/10. Over the past month, she has noticed some improvement in flexibility and her ability to walk with a cane. Her pain is still elevated throughout the day. She is down to taking 1 pain pill every 5-6 hours. She still has stiffness, weakness, and difficulty with ADLs. She would like to continue with PT at this time.    Objective          Observations     Additional Knee Observation Details  Incision healing very well.    Tenderness     Additional Tenderness Details  Tenderness in right knee over medial/lateral joint lines    Neurological Testing     Additional Neurological Details  Sensation to light touch intact and equal bilaterally from L2-S1 dermatomal pattern except for hyposensation in medial shin on right.    Active Range of Motion     Right Knee   Flexion: 105 degrees with pain  Extension: 4 (lacking) degrees with pain    Passive Range of Motion     Right Knee   Flexion: 115 degrees with pain  Extension: 1 (lacking) degrees with pain    Strength/Myotome Testing     Right Hip   Planes of Motion   Flexion: 4    Left Knee   Flexion: 5  Extension: 5  Quadriceps contraction: good    Right Knee   Flexion: 4+  Extension: 4  Quadriceps contraction: good    Ambulation     Ambulation: Level Surfaces     Additional Level Surfaces Ambulation  Details  The patient ambulates with a single tipped cane with slightly decreased stance time on the right lower extremity.      See Exercise, Manual, and Modality Logs for complete treatment.     Assessment & Plan       Assessment  Assessment details: The patient was re-evaluated today and presents with improvements in right knee ROM, strength, gait, and function (LEFS) compared to her initial evaluation. Although improved, she continues to present with left knee pain, weakness, stiffness, and functional deficits. She would benefit from continued skilled physical therapy to address remaining functional deficits and to allow the patient to return to her prior level of function.     Goals  Plan Goals: KNEE PROBLEMS     1. The patient has limited ROM of the right knee.              LTG 1:12 weeks:  The patient will demonstrate 0 to 120 degrees of ROM for the right knee in order to allow patient to ambulate and perform mobility related ADLs.                          STATUS:  Progressing              STG 1a: 6 weeks:  The patient will demonstrate 5 to 105 degrees of ROM for the right knee.                          STATUS:  Met     2. The patient has limited strength of the right knee.              LTG 2: 12 weeks: The patient will demonstrate 5 /5 strength for right knee flexion and extension in order to allow patient improved joint stability                          STATUS:  Progressing              STG 2a: 6 weeks: The patient will demonstrate 4+ /5 strength for right knee flexion and extension                          STATUS:  Progressing                3. The patient has gait dysfunction.              LTG 3: 12 weeks:  The patient will ambulate without assistive device, independently, for community distances with normal biomechanics of the right lower extremity in order to improve mobility and allow patient to perform activities such as grocery shopping with greater ease.                          STATUS:   Progressing              STG 3a: 4 weeks: The patient will ambulate with a single tipped cane with appropriate gait mechanics.                          STATUS:  Progressing     4. Mobility: Walking/Moving Around Functional Limitation                               LTG 4: 12 weeks:  The patient will demonstrate 25 % limitation by achieving a score of 60/80 on the LEFS.                          STATUS: Progressing              STG 4 a: 6 weeks:  The patient will demonstrate 50 % limitation by achieving a score of 40/80 on the LEFS.                            STATUS: Progressing        Progress toward previous goals: Partially Met      Recommendations: Continue as planned  Timeframe: 2 months  Prognosis to achieve goals: good    PT Signature: Aldo Barros PT    Electronically signed 2024    [place] License: PT - [id number]       Timed:  Manual Therapy:    8     mins  42593;  Therapeutic Exercise:    20     mins  23331;     Neuromuscular Stephen:    0    mins  04838;    Therapeutic Activity:     10     mins  80181;     Gait Trainin     mins  72216;     Aquatics                         0      mins  11628    Un-timed:  Mechanical Traction      0     mins  24089  Dry Needling     0     mins self-pay  Electrical Stimulation:    0     mins  20970 ( );    Timed Treatment:   38   mins   Total Treatment:     38   mins

## 2024-06-27 ENCOUNTER — TREATMENT (OUTPATIENT)
Dept: PHYSICAL THERAPY | Facility: CLINIC | Age: 59
End: 2024-06-27
Payer: COMMERCIAL

## 2024-06-27 DIAGNOSIS — R26.2 DIFFICULTY WALKING: ICD-10-CM

## 2024-06-27 DIAGNOSIS — M25.661 KNEE STIFFNESS, RIGHT: ICD-10-CM

## 2024-06-27 DIAGNOSIS — Z96.651 S/P TOTAL KNEE ARTHROPLASTY, RIGHT: Primary | ICD-10-CM

## 2024-06-27 DIAGNOSIS — M25.561 ACUTE PAIN OF RIGHT KNEE: ICD-10-CM

## 2024-06-27 NOTE — PROGRESS NOTES
Physical Therapy Daily Treatment Note                      [facility] [address] [facility] [address]    Patient: Jewels Beltrán   : 1965  Diagnosis/ICD-10 Code:  S/P total knee arthroplasty, right [Z96.651]  Referring practitioner: Bradley Bal MD  Date of Initial Visit: Type: THERAPY  Noted: 2024  Today's Date: 2024  Patient seen for 11 sessions           Subjective   The patient reported that her knee still really hurts with stretching, but otherwise is feeling better.    Objective   See Exercise, Manual, and Modality Logs for complete treatment.     Assessment/Plan  The patient demonstrated good tolerance to all strengthening interventions. Passive stretching is still quite painful, but her passive ROM is improving. Continue to progress per patient tolerance.       Timed:  Manual Therapy:    8     mins  82222;  Therapeutic Exercise:    20     mins  13775;     Neuromuscular Stephen:   0    mins  94875;    Therapeutic Activity:     10     mins  07102;     Gait Trainin     mins  91729;     Aquatics                         0      mins  93892    Un-timed:  Mechanical Traction      0     mins  84372  Dry Needling     0     mins self-pay  Electrical Stimulation:    0     mins  77001 ( );      Timed Treatment:   38   mins   Total Treatment:     38   mins    Aldo Barros PT  Physical Therapist    Electronically signed 2024    [place] License: PT - [id number]

## 2024-06-28 ENCOUNTER — TELEPHONE (OUTPATIENT)
Dept: ORTHOPEDIC SURGERY | Facility: CLINIC | Age: 59
End: 2024-06-28
Payer: COMMERCIAL

## 2024-06-28 DIAGNOSIS — M17.11 PRIMARY LOCALIZED OSTEOARTHRITIS OF RIGHT KNEE: ICD-10-CM

## 2024-06-29 DIAGNOSIS — I10 PRIMARY HYPERTENSION: ICD-10-CM

## 2024-06-29 RX ORDER — HYDROCODONE BITARTRATE AND ACETAMINOPHEN 7.5; 325 MG/1; MG/1
1 TABLET ORAL EVERY 4 HOURS PRN
Qty: 42 TABLET | Refills: 0 | Status: SHIPPED | OUTPATIENT
Start: 2024-06-29

## 2024-07-02 ENCOUNTER — TREATMENT (OUTPATIENT)
Dept: PHYSICAL THERAPY | Facility: CLINIC | Age: 59
End: 2024-07-02
Payer: COMMERCIAL

## 2024-07-02 DIAGNOSIS — M25.661 KNEE STIFFNESS, RIGHT: ICD-10-CM

## 2024-07-02 DIAGNOSIS — R26.2 DIFFICULTY WALKING: ICD-10-CM

## 2024-07-02 DIAGNOSIS — M25.561 ACUTE PAIN OF RIGHT KNEE: ICD-10-CM

## 2024-07-02 DIAGNOSIS — Z96.651 S/P TOTAL KNEE ARTHROPLASTY, RIGHT: Primary | ICD-10-CM

## 2024-07-02 NOTE — PROGRESS NOTES
Physical Therapy Daily Treatment Note      Patient: Jewels Beltrán   : 1965  Referring practitioner: Bradley Bal MD  Date of Initial Visit: Type: THERAPY  Noted: 2024  Today's Date: 2024  Patient seen for 12 sessions           Subjective Questionnaire:       Subjective Evaluation    History of Present Illness    Subjective comment: Pt reported 4/10 R knee pain stating it hurts today.       Objective          Active Range of Motion     Right Knee   Flexion: 110 degrees     Passive Range of Motion     Right Knee   Flexion: 122 degrees       See Exercise, Manual, and Modality Logs for complete treatment.       Assessment & Plan       Assessment  Assessment details: Pt ambulates without AD and antalgic gait with decreased R knee flexion and decreased stance phase on R.  Pt reported she got dizzy getting up from [employer].  Pt then ambulated to Clar[employer] hamstring curl machine and became teary.  Pt stated he thought it was cause  she hadn't had enough sleep secondary to R knee pain.  Pt continues to c/o pain with active assist and passive R knee flexion.      Visit Diagnoses:    ICD-10-CM ICD-9-CM   1. S/P total knee arthroplasty, right  Z96.651 V43.65   2. Acute pain of right knee  M25.561 719.46   3. Knee stiffness, right  M25.661 719.56   4. Difficulty walking  R26.2 719.7       Progress per Plan of Care and Progress strengthening /stabilization /functional activity           Timed:  Manual Therapy:    12     mins  20030;  Therapeutic Exercise:    23     mins  25133;     Neuromuscular Stephen:        mins  42510;    Therapeutic Activity:     10     mins  26986;     Gait Training:           mins  49934;     Ultrasound:          mins  16098;    Electrical Stimulation:         mins  38237 ( );  Aquatic Therapy          mins  68580    Untimed:  Electrical Stimulation:         mins  46883 ( );  Mechanical Traction:         mins  84277;     Timed Treatment:   45   mins   Total Treatment:      45   mins    Electronically signed    Viki White PTA  Physical Therapist Assistant    [facility] license: [id number]

## 2024-07-03 ENCOUNTER — OFFICE VISIT (OUTPATIENT)
Dept: FAMILY MEDICINE CLINIC | Facility: CLINIC | Age: 59
End: 2024-07-03
Payer: COMMERCIAL

## 2024-07-03 VITALS — BODY MASS INDEX: 37.61 KG/M2 | WEIGHT: 234 LBS | HEART RATE: 90 BPM | HEIGHT: 66 IN | OXYGEN SATURATION: 98 %

## 2024-07-03 DIAGNOSIS — E66.01 CLASS 3 SEVERE OBESITY WITH SERIOUS COMORBIDITY AND BODY MASS INDEX (BMI) OF 40.0 TO 44.9 IN ADULT, UNSPECIFIED OBESITY TYPE: ICD-10-CM

## 2024-07-03 DIAGNOSIS — Z12.31 BREAST CANCER SCREENING BY MAMMOGRAM: ICD-10-CM

## 2024-07-03 DIAGNOSIS — F32.A ANXIETY AND DEPRESSION: ICD-10-CM

## 2024-07-03 DIAGNOSIS — I10 PRIMARY HYPERTENSION: Primary | ICD-10-CM

## 2024-07-03 DIAGNOSIS — Z13.29 THYROID DISORDER SCREEN: ICD-10-CM

## 2024-07-03 DIAGNOSIS — F41.9 ANXIETY AND DEPRESSION: ICD-10-CM

## 2024-07-03 DIAGNOSIS — F17.219 CIGARETTE NICOTINE DEPENDENCE WITH NICOTINE-INDUCED DISORDER: ICD-10-CM

## 2024-07-03 DIAGNOSIS — Z12.2 ENCOUNTER FOR SCREENING FOR LUNG CANCER: ICD-10-CM

## 2024-07-03 PROBLEM — E66.812 CLASS 2 SEVERE OBESITY DUE TO EXCESS CALORIES WITH SERIOUS COMORBIDITY AND BODY MASS INDEX (BMI) OF 37.0 TO 37.9 IN ADULT: Status: ACTIVE | Noted: 2024-01-10

## 2024-07-03 RX ORDER — HYDROCHLOROTHIAZIDE 12.5 MG/1
12.5 TABLET ORAL DAILY
Qty: 90 TABLET | Refills: 1 | OUTPATIENT
Start: 2024-07-03

## 2024-07-03 RX ORDER — SERTRALINE HYDROCHLORIDE 25 MG/1
25 TABLET, FILM COATED ORAL DAILY
Qty: 30 TABLET | Refills: 1 | Status: SHIPPED | OUTPATIENT
Start: 2024-07-03

## 2024-07-03 RX ORDER — LISINOPRIL 40 MG/1
40 TABLET ORAL DAILY
Qty: 90 TABLET | Refills: 1 | OUTPATIENT
Start: 2024-07-03

## 2024-07-03 RX ORDER — HYDROCHLOROTHIAZIDE 12.5 MG/1
12.5 TABLET ORAL DAILY
Qty: 90 TABLET | Refills: 1 | Status: SHIPPED | OUTPATIENT
Start: 2024-07-03

## 2024-07-03 RX ORDER — LISINOPRIL 40 MG/1
40 TABLET ORAL DAILY
Qty: 90 TABLET | Refills: 1 | Status: SHIPPED | OUTPATIENT
Start: 2024-07-03

## 2024-07-03 NOTE — ASSESSMENT & PLAN NOTE
After discussion we have decided to trial Zoloft, side effects and administration of medication discussed, we will follow-up in 1 month for reevaluation, recommended patient look into counseling or some sort of therapy.  Patient states she will consider this

## 2024-07-03 NOTE — PROGRESS NOTES
Chief Complaint  Hypertension    SUBJECTIVE  Jewels Beltrán presents to [facility] for six month follow up on Hypertension.    Colonsocpy scheduled for next month.     Pt c/o anxiety/depression, onset over the last several months, has struggled since  passed in 2015 but has gotten worse in the last couple months.  Was on medicine for a while after he passed away, but was able to stop the medication and had done fairly well for a long time.  Patient thinks that the surgery (had recent knee replaced and is taking a while to heal ) and being restricted in her movement, not working, etc. has made this worse.    Pt states over thinking things, worry, worried about going back to work after knee surgery,     History of Present Illness  Past Medical History:   Diagnosis Date    Abnormal EKG 04/10/2024    EVAL W/CARDIOLOGY-NORMAL  STRESS TEST, NO CP OR SOA    Anxiety     Arthritis     Hypertension     Screening for malignant neoplasm of colon 01/31/2024      Family History   Problem Relation Age of Onset    Diabetes Mother     Cancer Mother     Arthritis Mother     Heart disease Father     Malig Hyperthermia Neg Hx       Past Surgical History:   Procedure Laterality Date    COLONOSCOPY      LAPAROSCOPIC TUBAL LIGATION      TOTAL KNEE ARTHROPLASTY Right 5/22/2024    Procedure: RIGHT TOTAL KNEE ARTHROPLASTY WITH LYNN ROBOT;  Surgeon: Bradley Bal MD;  Location: [facility] OR;  Service: Robotics - Ortho;  Laterality: Right;        Current Outpatient Medications:     aspirin (Ecotrin) 325 MG EC tablet, Take 1 tablet by mouth Daily. Begin after completing Eliquis, Disp: 21 tablet, Rfl: 0    hydroCHLOROthiazide 12.5 MG tablet, Take 1 tablet by mouth Daily., Disp: 90 tablet, Rfl: 1    lisinopril (PRINIVIL,ZESTRIL) 40 MG tablet, Take 1 tablet by mouth Daily., Disp: 90 tablet, Rfl: 1    HYDROcodone-acetaminophen (Norco) 7.5-325 MG per tablet, Take 1 tablet by mouth Every 4 (Four)  "Hours As Needed for Moderate Pain. (Patient not taking: Reported on 7/3/2024), Disp: 42 tablet, Rfl: 0    naloxone (NARCAN) 4 MG/0.1ML nasal spray, Call 911. Don't prime. [place] in 1 nostril for overdose. Repeat in 2-3 minutes in other nostril if no or minimal breathing/responsiveness. (Patient not taking: Reported on 7/3/2024), Disp: 2 each, Rfl: 0    sertraline (Zoloft) 25 MG tablet, Take 1 tablet by mouth Daily., Disp: 30 tablet, Rfl: 1    OBJECTIVE  Vital Signs:   Pulse 90   Ht 167.6 cm (66\")   Wt 106 kg (234 lb)   LMP 10/08/2016 (Approximate) Comment: 2017  SpO2 98%   BMI 37.77 kg/m²    Estimated body mass index is 37.77 kg/m² as calculated from the following:    Height as of this encounter: 167.6 cm (66\").    Weight as of this encounter: 106 kg (234 lb).     Wt Readings from Last 3 Encounters:   07/03/24 106 kg (234 lb)   06/04/24 111 kg (245 lb)   05/22/24 112 kg (245 lb 13 oz)     BP Readings from Last 3 Encounters:   06/04/24 116/81   05/23/24 118/77   05/15/24 116/68       Physical Exam  Vitals reviewed.   Constitutional:       Appearance: Normal appearance. She is well-developed.   HENT:      Head: Normocephalic and atraumatic.      Right Ear: External ear normal.      Left Ear: External ear normal.   Eyes:      Conjunctiva/sclera: Conjunctivae normal.      Pupils: Pupils are equal, round, and reactive to light.   Cardiovascular:      Rate and Rhythm: Normal rate and regular rhythm.      Heart sounds: No murmur heard.     No friction rub. No gallop.   Pulmonary:      Effort: Pulmonary effort is normal.      Breath sounds: Normal breath sounds. No wheezing or rhonchi.   Skin:     General: Skin is warm and dry.   Neurological:      Mental Status: She is alert and oriented to person, place, and time.      Cranial Nerves: No cranial nerve deficit.   Psychiatric:         Mood and Affect: Mood and affect normal.         Behavior: Behavior normal.         Thought Content: Thought content normal.         " Judgment: Judgment normal.          Result Review    CMP          2/26/2024    06:35 4/10/2024    10:26 5/15/2024    09:26   CMP   Glucose 96  102  167    BUN 19  18  15    Creatinine 0.85  0.87  0.87    EGFR 78.5  76.4  76.4    Sodium 144  137  136    Potassium 4.0  4.0  3.7    Chloride 106  102  100    Calcium 10.3  10.3  10.0    Total Protein 6.8  7.2  7.2    Albumin 4.4  4.4  4.3    Globulin 2.4  2.8  2.9    Total Bilirubin 0.6  0.7  0.7    Alkaline Phosphatase 71  81  84    AST (SGOT) 17  21  18    ALT (SGPT) 29  28  20    Albumin/Globulin Ratio 1.8  1.6  1.5    BUN/Creatinine Ratio 22.4  20.7  17.2    Anion Gap 12.0  11.5  9.9      CBC          4/10/2024    10:26 5/15/2024    09:26 5/23/2024    04:02   CBC   WBC 8.84  8.73     RBC 5.03  4.74     Hemoglobin 14.6  14.2  12.2    Hematocrit 45.0  42.5  37.6    MCV 89.5  89.7     MCH 29.0  30.0     MCHC 32.4  33.4     RDW 13.2  13.2     Platelets 309  284       Lipid Panel          7/10/2023    06:44 2/26/2024    06:35   Lipid Panel   Total Cholesterol 192  192    Triglycerides 80  180    HDL Cholesterol 50  41    VLDL Cholesterol 15  32    LDL Cholesterol  127  119    LDL/HDL Ratio 2.52  2.80      TSH          7/10/2023    06:44 2/26/2024    06:35   TSH   TSH 1.720  1.260      Most Recent A1C          5/15/2024    09:26   HGBA1C Most Recent   Hemoglobin A1C 5.60        A1C Last 3 Results          2/26/2024    06:35 4/10/2024    10:26 5/15/2024    09:26   HGBA1C Last 3 Results   Hemoglobin A1C 5.80  5.80  5.60      Lab Results   Component Value Date    DYXC25HJ 34.3 02/02/2022        Lab Results   Component Value Date    FREET4 1.1 05/20/2021     Lab Results   Component Value Date    CRNQFBRG96 443 02/02/2022       XR Knee 1 or 2 View Right    Result Date: 5/22/2024  Impression: 1.  Status post knee arthroplasty. 2.  No immediate postoperative complication is identified.   Electronically Signed By-Caleb Johnson MD On:5/22/2024 11:15 AM         The above data has  been reviewed by AYAN Garzon 07/03/2024 09:58 EDT.          Patient Care Team:  Ashly Andrew APRN as PCP - General (Nurse Practitioner)            ASSESSMENT & PLAN    Diagnoses and all orders for this visit:    1. Primary hypertension (Primary)  Overview:  Hypertension is stable, continue current dose of medication    Orders:  -     hydroCHLOROthiazide 12.5 MG tablet; Take 1 tablet by mouth Daily.  Dispense: 90 tablet; Refill: 1  -     lisinopril (PRINIVIL,ZESTRIL) 40 MG tablet; Take 1 tablet by mouth Daily.  Dispense: 90 tablet; Refill: 1  -     Comprehensive Metabolic Panel; Future  -     CBC & Differential; Future  -     Lipid Panel; Future    2. Encounter for screening for lung cancer  -      CT Chest Low Dose Cancer Screening WO; Future    3. Cigarette nicotine dependence with nicotine-induced disorder  Comments:  Patient continues to smoke but is working on cutting back and states plans to eventually quit  Orders:  -      CT Chest Low Dose Cancer Screening WO; Future    4. Breast cancer screening by mammogram  -     Mammo Screening Digital Tomosynthesis Bilateral With CAD; Future    5. Class 3 severe obesity with serious comorbidity and body mass index (BMI) of 40.0 to 44.9 in adult, unspecified obesity type    6. Anxiety and depression  Assessment & Plan:  After discussion we have decided to trial Zoloft, side effects and administration of medication discussed, we will follow-up in 1 month for reevaluation, recommended patient look into counseling or some sort of therapy.  Patient states she will consider this    Orders:  -     sertraline (Zoloft) 25 MG tablet; Take 1 tablet by mouth Daily.  Dispense: 30 tablet; Refill: 1  -     TSH Rfx On Abnormal To Free T4; Future    7. Thyroid disorder screen  -     TSH Rfx On Abnormal To Free T4; Future         Tobacco Use: High Risk (7/3/2024)    Patient History     Smoking Tobacco Use: Every Day     Smokeless Tobacco Use: Never     Passive Exposure:  Current       Follow Up     Return in about 6 months (around 1/3/2025), or if symptoms worsen or fail to improve.        Patient was given instructions and counseling regarding her condition or for health maintenance advice. Please see specific information pulled into the AVS if appropriate.   I have reviewed information obtained and documented by others and I have confirmed the accuracy of this documented note.    Ashly Andrew, APRN

## 2024-07-05 ENCOUNTER — TREATMENT (OUTPATIENT)
Dept: PHYSICAL THERAPY | Facility: CLINIC | Age: 59
End: 2024-07-05
Payer: COMMERCIAL

## 2024-07-05 DIAGNOSIS — R26.2 DIFFICULTY WALKING: ICD-10-CM

## 2024-07-05 DIAGNOSIS — M25.661 KNEE STIFFNESS, RIGHT: ICD-10-CM

## 2024-07-05 DIAGNOSIS — M25.561 ACUTE PAIN OF RIGHT KNEE: ICD-10-CM

## 2024-07-05 DIAGNOSIS — Z96.651 S/P TOTAL KNEE ARTHROPLASTY, RIGHT: Primary | ICD-10-CM

## 2024-07-05 NOTE — PROGRESS NOTES
Physical Therapy Daily Treatment Note                      [facility] [address] [place] [address]    Patient: Jewels Beltrán   : 1965  Diagnosis/ICD-10 Code:  S/P total knee arthroplasty, right [Z96.651]  Referring practitioner: Bradley Bal MD  Date of Initial Visit: Type: THERAPY  Noted: 2024  Today's Date: 2024  Patient seen for 13 sessions           Subjective   The patient reported that her knee pain is about the same. It feels like there is a vice  around her knee when she tries to stretch it.     Objective   See Exercise, Manual, and Modality Logs for complete treatment.     Assessment/Plan  The patient demonstrated good tolerance to all interventions today. Her knee ROM is progressing quite well. Continue with current PT plan of care.       Timed:  Manual Therapy:    8     mins  88914;  Therapeutic Exercise:    20     mins  90719;     Neuromuscular Stephen:   0    mins  44644;    Therapeutic Activity:     10     mins  90229;     Gait Trainin     mins  71617;     Aquatics                         0      mins  10960    Un-timed:  Mechanical Traction      0     mins  67807  Dry Needling     0     mins self-pay  Electrical Stimulation:    0     mins  34310 ( );      Timed Treatment:   38   mins   Total Treatment:     38   mins    Aldo Barros PT  Physical Therapist    Electronically signed 2024    [place] License: PT - [id number]

## 2024-07-09 ENCOUNTER — OFFICE VISIT (OUTPATIENT)
Dept: ORTHOPEDIC SURGERY | Facility: CLINIC | Age: 59
End: 2024-07-09
Payer: COMMERCIAL

## 2024-07-09 ENCOUNTER — TREATMENT (OUTPATIENT)
Dept: PHYSICAL THERAPY | Facility: CLINIC | Age: 59
End: 2024-07-09
Payer: COMMERCIAL

## 2024-07-09 VITALS
HEIGHT: 66 IN | OXYGEN SATURATION: 96 % | WEIGHT: 234 LBS | SYSTOLIC BLOOD PRESSURE: 119 MMHG | DIASTOLIC BLOOD PRESSURE: 82 MMHG | HEART RATE: 84 BPM | BODY MASS INDEX: 37.61 KG/M2

## 2024-07-09 DIAGNOSIS — Z96.651 S/P TOTAL KNEE ARTHROPLASTY, RIGHT: Primary | ICD-10-CM

## 2024-07-09 DIAGNOSIS — M17.11 PRIMARY LOCALIZED OSTEOARTHRITIS OF RIGHT KNEE: ICD-10-CM

## 2024-07-09 DIAGNOSIS — Z47.89 AFTERCARE FOLLOWING SURGERY OF THE MUSCULOSKELETAL SYSTEM: Primary | ICD-10-CM

## 2024-07-09 DIAGNOSIS — M25.661 KNEE STIFFNESS, RIGHT: ICD-10-CM

## 2024-07-09 DIAGNOSIS — R26.2 DIFFICULTY WALKING: ICD-10-CM

## 2024-07-09 DIAGNOSIS — M25.561 ACUTE PAIN OF RIGHT KNEE: ICD-10-CM

## 2024-07-09 PROCEDURE — 99024 POSTOP FOLLOW-UP VISIT: CPT | Performed by: PHYSICIAN ASSISTANT

## 2024-07-09 RX ORDER — HYDROCODONE BITARTRATE AND ACETAMINOPHEN 7.5; 325 MG/1; MG/1
1 TABLET ORAL EVERY 4 HOURS PRN
Qty: 42 TABLET | Refills: 0 | Status: SHIPPED | OUTPATIENT
Start: 2024-07-09

## 2024-07-09 NOTE — PROGRESS NOTES
"Physical Therapy Daily Treatment Note  [facility] [address] [place] [address]    Patient: Jewels Beltrán   : 1965  Referring practitioner: Bradley Bal MD  Date of Initial Visit: Type: THERAPY  Noted: 2024  Today's Date: 2024  Patient seen for 14 sessions           Subjective  Jewels Beltrán reports:  she just saw CINDY Perdomo upstairs. \"He is having me see him again in 6 weeks.\"    Objective   R knee flexion stretching with foot on half wall 105.    See Exercise, Manual, and Modality Logs for complete treatment.     Assessment/Plan  Pain after session 4/10. Reviewed gait with normalizing R knee ROM as Jewels tends to keep knee rigid, not allowing motion. She requires additional treatment to address ROM, strength and function following R knee TKA.    Visit Diagnoses:    ICD-10-CM ICD-9-CM   1. S/P total knee arthroplasty, right  Z96.651 V43.65   2. Acute pain of right knee  M25.561 719.46   3. Knee stiffness, right  M25.661 719.56   4. Difficulty walking  R26.2 719.7       Progress per Plan of Care and Progress strengthening /stabilization /functional activity           Timed:  Manual Therapy:         mins  38470;  Therapeutic Exercise:    10     mins  21076;     Neuromuscular Stephen:    10    mins  20675;    Therapeutic Activity:     10     mins  61647;     Gait Training:           mins  74557;     Ultrasound:          mins  06210;    Electrical Stimulation:         mins  61047 ( );  Aquatics  __   mins   91467    Untimed:  Electrical Stimulation:         mins  24250 ( );  Mechanical Traction:         mins  50005;     Timed Treatment:   30   mins   Total Treatment:     30   mins    Electronically Signed:  Yazmin Araujo PTA  Physical Therapist Assistant    [place] PTA license [id number]            "

## 2024-07-09 NOTE — PROGRESS NOTES
Chief Complaint  Follow-up of the Right Knee    Subjective          History of Present Illness      Jewels Beltrán is a 58 y.o. female  presents to [facility] for     Patient presents for follow-up evaluation of right total knee arthroplasty 5/22/2024 she is here with her Sister Montse.  She states she has been trying to sparingly take the pain medication she states she still has pain in the knee she has concern that she wants to return to work but does not feel ready.  She is attending therapy at the [facility] office.  She states the incision has healed well and denies redness drainage or dehiscence.  She denies calf pain she did finish the DVT prophylaxis.  She presents without a cane or a walker.      No Known Allergies     Social History     Socioeconomic History    Marital status:    Tobacco Use    Smoking status: Every Day     Current packs/day: 1.00     Average packs/day: 1 pack/day for 44.5 years (44.5 ttl pk-yrs)     Types: Cigarettes     Start date: 1/1/1980     Passive exposure: Current    Smokeless tobacco: Never    Tobacco comments:     SMOKED 21-30 YEARS     INST PER ANESTHESIA PROTOCOL          PT STATES SHE DID NOT SMOKE AM OF SURGERY    Vaping Use    Vaping status: Never Used   Substance and Sexual Activity    Alcohol use: Yes     Alcohol/week: 2.0 standard drinks of alcohol     Types: 2 Drinks containing 0.5 oz of alcohol per week     Comment: 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   Cardiovascular: Denies chest pain, shortness of breath  Skin: Denies rashes, acute skin changes  Neurologic: Denies headache, loss of  "consciousness  MSK: Right knee pain      Objective   Vital Signs:   /82   Pulse 84   Ht 167.6 cm (65.98\")   Wt 106 kg (234 lb)   SpO2 96%   BMI 37.79 kg/m²     Body mass index is 37.79 kg/m².    Physical Exam       Right knee: Incisions are well-healed, no erythema, no ecchymosis or streaking, no signs of infection, no effusion, mild generalized swelling, extension -5 flexion 110, stable to varus/valgus stress stable anterior/posterior drawer, neurovascularly intact.  Nontender calf, negative Juve testing      Procedures    Imaging Results (Most Recent)       None             Result Review :   The following data was reviewed by: ERIKA Lu on 07/09/2024:               Assessment and Plan    Diagnoses and all orders for this visit:    1. Aftercare following surgery of right total knee arthroplasty 5/22/2024 (Primary)        Discussed diagnosis and treatment options with the patient she was advised to continue therapy for strength and range of motion, she was given pain medication refill take as needed, follow-up in 6 weeks for recheck with x-rays    Call or return if worsening symptoms.    Follow Up   Return in about 6 weeks (around 8/20/2024) for Recheck.  Patient was given instructions and counseling regarding her condition or for health maintenance advice. Please see specific information pulled into the AVS if appropriate.       EMR Dragon/Transcription disclaimer:  Part of this note may be an electronic transcription/translation of spoken language to printed text using the Dragon Dictation System  "

## 2024-07-11 ENCOUNTER — TREATMENT (OUTPATIENT)
Dept: PHYSICAL THERAPY | Facility: CLINIC | Age: 59
End: 2024-07-11
Payer: COMMERCIAL

## 2024-07-11 DIAGNOSIS — R26.2 DIFFICULTY WALKING: ICD-10-CM

## 2024-07-11 DIAGNOSIS — M25.661 KNEE STIFFNESS, RIGHT: ICD-10-CM

## 2024-07-11 DIAGNOSIS — Z96.651 S/P TOTAL KNEE ARTHROPLASTY, RIGHT: Primary | ICD-10-CM

## 2024-07-11 DIAGNOSIS — M25.561 ACUTE PAIN OF RIGHT KNEE: ICD-10-CM

## 2024-07-11 NOTE — PROGRESS NOTES
Physical Therapy Daily Treatment Note  [facility] [address] [place] [address]    Patient: Jewels Beltrán   : 1965  Referring practitioner: Bradley Bal MD  Date of Initial Visit: Type: THERAPY  Noted: 2024  Today's Date: 2024  Patient seen for 15 sessions           Subjective  Jewels Beltrán reports: pain at 2/10. She believes she has got a bad habit with keeping RLE rigid during gait.      Objective   Hooklying R knee flexion active 115 after manual work. Active extension lacks 3 degrees.     Jewels has an area medial R knee/thigh that appears to have been burned by the ice. Advised she use additional toweling and reduce time using ice.     See Exercise, Manual, and Modality Logs for complete treatment.     Assessment/Plan  Jewels had tightness at RLE that responded well to manual therapy. She demonstrated gains in AROM following session. Continued encouragement for how well she is doing.    Visit Diagnoses:    ICD-10-CM ICD-9-CM   1. S/P total knee arthroplasty, right  Z96.651 V43.65   2. Acute pain of right knee  M25.561 719.46   3. Knee stiffness, right  M25.661 719.56   4. Difficulty walking  R26.2 719.7       Progress per Plan of Care and Progress strengthening /stabilization /functional activity           Timed:  Manual Therapy:    23     mins  70613;  Therapeutic Exercise:         mins  94755;     Neuromuscular Stephen:        mins  05834;    Therapeutic Activity:     10     mins  85841;     Gait Training:           mins  62989;     Ultrasound:          mins  63895;    Electrical Stimulation:         mins  57043 ( );  Aquatics  __   mins   08055    Untimed:  Electrical Stimulation:         mins  65373 ( );  Mechanical Traction:         mins  20776;     Timed Treatment:   33   mins   Total Treatment:     33   mins    Electronically Signed:  Yazmin Araujo PTA  Physical Therapist Assistant    [place] PTA license [id number]

## 2024-07-16 ENCOUNTER — TREATMENT (OUTPATIENT)
Dept: PHYSICAL THERAPY | Facility: CLINIC | Age: 59
End: 2024-07-16
Payer: COMMERCIAL

## 2024-07-16 DIAGNOSIS — Z96.651 S/P TOTAL KNEE ARTHROPLASTY, RIGHT: Primary | ICD-10-CM

## 2024-07-16 DIAGNOSIS — M25.661 KNEE STIFFNESS, RIGHT: ICD-10-CM

## 2024-07-16 DIAGNOSIS — M25.561 ACUTE PAIN OF RIGHT KNEE: ICD-10-CM

## 2024-07-16 DIAGNOSIS — R26.2 DIFFICULTY WALKING: ICD-10-CM

## 2024-07-16 NOTE — PROGRESS NOTES
Physical Therapy Daily Treatment Note  [facility] [address] [place] [address]    Patient: Jewels Beltrán   : 1965  Referring practitioner: Bradley Bal MD  Date of Initial Visit: Type: THERAPY  Noted: 2024  Today's Date: 2024  Patient seen for 16 sessions           Subjective  Jewels Beltrán reports: her pain at 3/10.      Objective   See Exercise, Manual, and Modality Logs for complete treatment.     Assessment/Plan  Gait improved with good reciprocol pattern, freer R knee motion.    Visit Diagnoses:    ICD-10-CM ICD-9-CM   1. S/P total knee arthroplasty, right  Z96.651 V43.65   2. Acute pain of right knee  M25.561 719.46   3. Knee stiffness, right  M25.661 719.56   4. Difficulty walking  R26.2 719.7       Progress per Plan of Care and Progress strengthening /stabilization /functional activity         Timed:  Manual Therapy:         mins  39657;  Therapeutic Exercise:    10     mins  80532;     Neuromuscular Stephen:    10    mins  30939;    Therapeutic Activity:     10     mins  55997;     Gait Training:           mins  69642;     Ultrasound:          mins  08207;    Electrical Stimulation:         mins  83111 ( );  Aquatics  __   mins   97930    Untimed:  Electrical Stimulation:         mins  78678 ( );  Mechanical Traction:         mins  49320;     Timed Treatment:   30   mins   Total Treatment:     30   mins    Electronically Signed:  Yazmin Araujo PTA  Physical Therapist Assistant    [place] \[facility]\"" license [id number]

## 2024-07-18 ENCOUNTER — TREATMENT (OUTPATIENT)
Dept: PHYSICAL THERAPY | Facility: CLINIC | Age: 59
End: 2024-07-18
Payer: COMMERCIAL

## 2024-07-18 ENCOUNTER — TELEPHONE (OUTPATIENT)
Dept: ORTHOPEDIC SURGERY | Facility: CLINIC | Age: 59
End: 2024-07-18
Payer: COMMERCIAL

## 2024-07-18 DIAGNOSIS — M25.561 ACUTE PAIN OF RIGHT KNEE: ICD-10-CM

## 2024-07-18 DIAGNOSIS — M25.661 KNEE STIFFNESS, RIGHT: ICD-10-CM

## 2024-07-18 DIAGNOSIS — R26.2 DIFFICULTY WALKING: ICD-10-CM

## 2024-07-18 DIAGNOSIS — M17.11 PRIMARY LOCALIZED OSTEOARTHRITIS OF RIGHT KNEE: ICD-10-CM

## 2024-07-18 DIAGNOSIS — Z96.651 S/P TOTAL KNEE ARTHROPLASTY, RIGHT: Primary | ICD-10-CM

## 2024-07-18 RX ORDER — HYDROCODONE BITARTRATE AND ACETAMINOPHEN 7.5; 325 MG/1; MG/1
1 TABLET ORAL EVERY 4 HOURS PRN
Qty: 42 TABLET | Refills: 0 | Status: SHIPPED | OUTPATIENT
Start: 2024-07-18

## 2024-07-18 NOTE — PROGRESS NOTES
Physical Therapy Daily Treatment Note  [facility] [address] [place] [address]    Patient: Jewels Beltrán   : 1965  Referring practitioner: Bradley Bal MD  Date of Initial Visit: Type: THERAPY  Noted: 2024  Today's Date: 2024  Patient seen for 17 sessions           Subjective  Jewels Beltrán reports: she feels like she has a sand bag tightly wrapped around her R knee restricting the motion. Jewels notes she is feeling more discomfort in her L knee at times.     Objective   R knee active lacking 3 degrees extension to 120. Passive 0 extension.     See Exercise, Manual, and Modality Logs for complete treatment.     Assessment/Plan  R knee remains swollen. Jewels has many tender areas found during manual therapy, these reduced with attention. VC required to allow her R knee to move freely during gait. She is showing good gains in ROM, strength and function yet further care required to attain the best possible outcome following  R TKA.    Visit Diagnoses:    ICD-10-CM ICD-9-CM   1. S/P total knee arthroplasty, right  Z96.651 V43.65   2. Acute pain of right knee  M25.561 719.46   3. Knee stiffness, right  M25.661 719.56   4. Difficulty walking  R26.2 719.7       Progress per Plan of Care and Progress strengthening /stabilization /functional activity         Timed:  Manual Therapy:    23     mins  06093;  Therapeutic Exercise:         mins  49816;     Neuromuscular Stephen:    8    mins  93565;    Therapeutic Activity:     10     mins  41020;     Gait Training:           mins  32990;     Ultrasound:          mins  47250;    Electrical Stimulation:         mins  38810 ( );  Aquatics  __   mins   85968    Untimed:  Electrical Stimulation:         mins  52445 ( );  Mechanical Traction:         mins  17809;     Timed Treatment:   41   mins   Total Treatment:     41   mins    Electronically Signed:  Yazmin Araujo PTA  Physical Therapist Assistant    [place] license  [id number]

## 2024-07-18 NOTE — TELEPHONE ENCOUNTER
PATIENT REQUESTING PAIN MED REFILL.   [facility] [place]  
 used

## 2024-07-22 ENCOUNTER — TREATMENT (OUTPATIENT)
Dept: PHYSICAL THERAPY | Facility: CLINIC | Age: 59
End: 2024-07-22
Payer: COMMERCIAL

## 2024-07-22 DIAGNOSIS — Z96.651 S/P TOTAL KNEE ARTHROPLASTY, RIGHT: Primary | ICD-10-CM

## 2024-07-22 DIAGNOSIS — M25.661 KNEE STIFFNESS, RIGHT: ICD-10-CM

## 2024-07-22 DIAGNOSIS — M25.561 ACUTE PAIN OF RIGHT KNEE: ICD-10-CM

## 2024-07-22 DIAGNOSIS — R26.2 DIFFICULTY WALKING: ICD-10-CM

## 2024-07-22 PROCEDURE — 97140 MANUAL THERAPY 1/> REGIONS: CPT | Performed by: PHYSICAL THERAPIST

## 2024-07-22 PROCEDURE — 97110 THERAPEUTIC EXERCISES: CPT | Performed by: PHYSICAL THERAPIST

## 2024-07-22 NOTE — PROGRESS NOTES
Outpatient Physical Therapy  [address] [facility], [place] [address]                            Physical Therapy Daily Treatment Note      Patient: Jewels Beltrán   : 1965  Diagnosis/ICD-10 Code:  S/P total knee arthroplasty, right [Z96.651]  Referring practitioner: Bradley Bal MD  Date of Initial Visit: Type: THERAPY  Noted: 2024  Today's Date: 2024  Patient seen for 18 sessions           Subjective   Jewels Beltrán reports: that she is a little more swollen today, states that she has been up on her feet more. Reports that she is still having issues sleeping.     Objective   Some discomfort with Soft Tissue Massage medial knee.    See Exercise, Manual, and Modality Logs for complete treatment.     Assessment/Plan  Jewels progressing as evident by decreased overall R knee pain, although more swelling today. Pt tolerated exercises and manual well,  some discomfort with manual . Pt would benefit from skilled PT to address Range of Motion  and Strength deficits, pain management and any concerns with ADLs.       Progress per Plan of Care         Timed:  Manual Therapy:    23     mins  55680;  Therapeutic Exercise:    8     mins  23740;     Neuromuscular Stephen:        mins  15103;    Therapeutic Activity:          mins  22987;     Gait Training:           mins  57218;       Untimed:  Electrical Stimulation:         mins  58720 ( );  Mechanical Traction:        mins  43223;       Timed Treatment:   31   mins   Total Treatment:     31   mins      Electronically signed:   Melania Cantu PTA  Physical Therapist Assistant  [facility] License #: [id number]

## 2024-07-24 ENCOUNTER — TREATMENT (OUTPATIENT)
Dept: PHYSICAL THERAPY | Facility: CLINIC | Age: 59
End: 2024-07-24
Payer: COMMERCIAL

## 2024-07-24 DIAGNOSIS — M25.661 KNEE STIFFNESS, RIGHT: ICD-10-CM

## 2024-07-24 DIAGNOSIS — M25.561 ACUTE PAIN OF RIGHT KNEE: ICD-10-CM

## 2024-07-24 DIAGNOSIS — R26.2 DIFFICULTY WALKING: ICD-10-CM

## 2024-07-24 DIAGNOSIS — Z96.651 S/P TOTAL KNEE ARTHROPLASTY, RIGHT: Primary | ICD-10-CM

## 2024-07-24 NOTE — PROGRESS NOTES
Progress Assessment  [address] [place] [id number]        Patient: Jewels Beltrán   : 1965  Diagnosis/ICD-10 Code:  S/P total knee arthroplasty, right [Z96.651]  Referring practitioner: Bradley Bla MD  Date of Initial Visit: Type: THERAPY  Noted: 2024  Today's Date: 2024  Patient seen for 19 sessions      Subjective:     Subjective Questionnaire: LEFS: 36/80 = 45% function  Clinical Progress: improved  Home Program Compliance: Yes  Treatment has included: therapeutic exercise, neuromuscular re-education, manual therapy, therapeutic activity, and gait training    Subjective : Jewels Beltrán reports: she feels like she is doing better over the last several weeks, no longer using her cane. She is feeling stronger, still has aching and stiffness.    Current Pain /10      Objective     Observations      Additional Knee Observation Details  Incision healing very well overall with one small spot at the superior part           Active Range of Motion      Right Knee   Flexion: 105 degrees with pain  Extension: 1 (lacking) degrees with pain     Passive Range of Motion      Right Knee   Flexion: 115 degrees with pain  Extension: 0 degrees with pain     Strength/Myotome Testing      Right Hip   Planes of Motion   Flexion: 4     Left Knee   Flexion: 5  Extension: 5  Quadriceps contraction: good     Right Knee   Flexion: 4+  Extension: 4+  Quadriceps contraction: good     Ambulation      Ambulation: Level Surfaces      Additional Level Surfaces Ambulation Details  The patient ambulates without an assistive device with slightly decreased stance time on the right lower extremity.       Assessment & Plan       Assessment  Assessment details: The patient was re-evaluated today and presents with improvements in right knee ROM, strength, gait, and function (LEFS) compared to her initial evaluation. Additionally, she has met two more of her goals set at the evaluation and is expected to  continue to improve further with strength and ROM. She is no longer ambulating with an assistive device at this time. Although improved, she continues to present with left knee pain, weakness, stiffness, and functional deficits. She would benefit from continued skilled physical therapy to address remaining functional deficits and to allow the patient to return to her prior level of function.     Goals  Plan Goals: KNEE PROBLEMS     1. The patient has limited ROM of the right knee.              LTG 1:12 weeks:  The patient will demonstrate 0 to 120 degrees of ROM for the right knee in order to allow patient to ambulate and perform mobility related ADLs.                          STATUS:  Progressing              STG 1a: 6 weeks:  The patient will demonstrate 5 to 105 degrees of ROM for the right knee.                          STATUS:  Met     2. The patient has limited strength of the right knee.              LTG 2: 12 weeks: The patient will demonstrate 5 /5 strength for right knee flexion and extension in order to allow patient improved joint stability                          STATUS:  Progressing              STG 2a: 6 weeks: The patient will demonstrate 4+ /5 strength for right knee flexion and extension                          STATUS: MET                3. The patient has gait dysfunction.              LTG 3: 12 weeks:  The patient will ambulate without assistive device, independently, for community distances with normal biomechanics of the right lower extremity in order to improve mobility and allow patient to perform activities such as grocery shopping with greater ease.                          STATUS:  Progressing              STG 3a: 4 weeks: The patient will ambulate with a single tipped cane with appropriate gait mechanics.                          STATUS:  MET     4. Mobility: Walking/Moving Around Functional Limitation                               LTG 4: 12 weeks:  The patient will demonstrate 25 %  limitation by achieving a score of 60/80 on the LEFS.                          STATUS: Progressing              STG 4 a: 6 weeks:  The patient will demonstrate 50 % limitation by achieving a score of 40/80 on the LEFS.                            STATUS: Progressing        Progress toward previous goals: Partially Met    See Exercise, Manual, and Modality Logs for complete treatment.         Recommendations: Continue as planned  Timeframe: 1 month  Prognosis to achieve goals: good    PT Signature: Juan Antonio Cantu, PT    Electronically signed 2024    [place] License: PT - [id number]     Based upon review of the patient's progress and continued therapy plan, it is my medical opinion that Jewels Beltrán should continue physical therapy treatment at [facility] PHYSICAL THERAPY  [address]   [place] [address] [phone].      Timed:         Manual Therapy:    0     mins  79534;     Therapeutic Exercise:    25     mins  90637;     Neuromuscular Stephen:    0    mins  68779;    Therapeutic Activity:     8     mins  18012;     Gait Trainin     mins  05669;     Ultrasound:     0     mins  35272;    Self Care                       0     mins   96347  Aquatic                          0     mins 50775          Timed Treatment:   33   mins   Total Treatment:     33   mins      I certify that the therapy services are furnished while this patient is under my care.  The services outlined above are required by this patient, and will be reviewed every 90 days.

## 2024-07-26 ENCOUNTER — TELEPHONE (OUTPATIENT)
Dept: ORTHOPEDIC SURGERY | Facility: CLINIC | Age: 59
End: 2024-07-26
Payer: COMMERCIAL

## 2024-07-26 DIAGNOSIS — Z47.89 AFTERCARE FOLLOWING SURGERY OF THE MUSCULOSKELETAL SYSTEM: Primary | ICD-10-CM

## 2024-07-26 RX ORDER — HYDROCODONE BITARTRATE AND ACETAMINOPHEN 7.5; 325 MG/1; MG/1
1 TABLET ORAL EVERY 6 HOURS PRN
Qty: 28 TABLET | Refills: 0 | Status: SHIPPED | OUTPATIENT
Start: 2024-07-26

## 2024-07-30 ENCOUNTER — TREATMENT (OUTPATIENT)
Dept: PHYSICAL THERAPY | Facility: CLINIC | Age: 59
End: 2024-07-30
Payer: COMMERCIAL

## 2024-07-30 DIAGNOSIS — M25.661 KNEE STIFFNESS, RIGHT: ICD-10-CM

## 2024-07-30 DIAGNOSIS — R26.2 DIFFICULTY WALKING: ICD-10-CM

## 2024-07-30 DIAGNOSIS — Z96.651 S/P TOTAL KNEE ARTHROPLASTY, RIGHT: Primary | ICD-10-CM

## 2024-07-30 DIAGNOSIS — M25.561 ACUTE PAIN OF RIGHT KNEE: ICD-10-CM

## 2024-07-30 PROCEDURE — 97112 NEUROMUSCULAR REEDUCATION: CPT | Performed by: PHYSICAL THERAPIST

## 2024-07-30 PROCEDURE — 97110 THERAPEUTIC EXERCISES: CPT | Performed by: PHYSICAL THERAPIST

## 2024-07-30 PROCEDURE — 97530 THERAPEUTIC ACTIVITIES: CPT | Performed by: PHYSICAL THERAPIST

## 2024-07-30 NOTE — PROGRESS NOTES
"Physical Therapy Daily Treatment Note  [facility]iera LEE [address] [place] [address]    Patient: Jewels Beltrán   : 1965  Referring practitioner: Bradley Bal MD  Date of Initial Visit: Type: THERAPY  Noted: 2024  Today's Date: 2024  Patient seen for 20 sessions           Subjective  Jewels Beltrán reports: her pain is 1-2/10. Jewels reported she is returning to work next Tuesday.  She indicated she will stop taking narcotics and questioned what to take. PTA advised she contact pharmacist for their advise.    Objective   See Exercise, Manual, and Modality Logs for complete treatment.     Assessment/Plan  Jewels is advancing with functional ability and gross strength evident with progressions documented on flow sheet. She stil notes the \"cement bag feeling\" R knee. Continue per POC.     Visit Diagnoses:    ICD-10-CM ICD-9-CM   1. S/P total knee arthroplasty, right  Z96.651 V43.65   2. Acute pain of right knee  M25.561 719.46   3. Knee stiffness, right  M25.661 719.56   4. Difficulty walking  R26.2 719.7       Progress per Plan of Care and Progress strengthening /stabilization /functional activity       Timed:  Manual Therapy:         mins  73539;  Therapeutic Exercise:    10     mins  59120;     Neuromuscular Stephen:    11    mins  74972;    Therapeutic Activity:     10     mins  53277;     Gait Training:           mins  36697;     Ultrasound:          mins  85382;    Electrical Stimulation:         mins  96567 ( );  Aquatics  __   mins   80549    Untimed:  Electrical Stimulation:         mins  87739 ( );  Mechanical Traction:         mins  50298;     Timed Treatment:   31   mins   Total Treatment:     31   mins    Electronically Signed:  Yazmin Araujo PTA  Physical Therapist Assistant    [place] PTA license [id number]            "

## 2024-08-07 DIAGNOSIS — Z47.89 AFTERCARE FOLLOWING SURGERY OF THE MUSCULOSKELETAL SYSTEM: Primary | ICD-10-CM

## 2024-08-07 RX ORDER — AMOXICILLIN 500 MG/1
CAPSULE ORAL
Qty: 4 CAPSULE | Refills: 0 | Status: SHIPPED | OUTPATIENT
Start: 2024-08-07

## 2024-08-14 ENCOUNTER — DOCUMENTATION (OUTPATIENT)
Dept: PHYSICAL THERAPY | Facility: CLINIC | Age: 59
End: 2024-08-14
Payer: COMMERCIAL

## 2024-08-14 NOTE — PROGRESS NOTES
Outpatient Physical Therapy  [address], [place] [address]      Discharge Summary  Discharge Summary from Physical Therapy Report      Dates  PT visit: 5/24/24-7/30/24  Number of Visits: 20       Goals: Partially Met    Discharge Plan: Continue with current home exercise program as instructed    Comment: Hard Max met on insurance    Date of Discharge 8/14/2024      Electronically signed:   Melania Catnu PTA  Physical Therapist Assistant  [facility] License #: [id number]

## 2024-08-19 ENCOUNTER — OFFICE VISIT (OUTPATIENT)
Dept: FAMILY MEDICINE CLINIC | Facility: CLINIC | Age: 59
End: 2024-08-19
Payer: COMMERCIAL

## 2024-08-19 VITALS
HEIGHT: 66 IN | DIASTOLIC BLOOD PRESSURE: 77 MMHG | WEIGHT: 237 LBS | HEART RATE: 78 BPM | BODY MASS INDEX: 38.09 KG/M2 | SYSTOLIC BLOOD PRESSURE: 130 MMHG | OXYGEN SATURATION: 97 %

## 2024-08-19 DIAGNOSIS — I10 PRIMARY HYPERTENSION: ICD-10-CM

## 2024-08-19 DIAGNOSIS — F41.9 ANXIETY AND DEPRESSION: Primary | ICD-10-CM

## 2024-08-19 DIAGNOSIS — F32.A ANXIETY AND DEPRESSION: Primary | ICD-10-CM

## 2024-08-19 PROCEDURE — 99213 OFFICE O/P EST LOW 20 MIN: CPT | Performed by: NURSE PRACTITIONER

## 2024-08-19 NOTE — PROGRESS NOTES
"Chief Complaint  Anxiety, Depression, and Insomnia    SUBJECTIVE  Jewels Beltrán presents to [facility] to follow up on Zoloft. Pt reports she feels ok with but is now having trouble sleeping.     States that she feels like her anxiety and depression has improved quite a bit, but she does feel like the Zoloft has interfered with her sleep, she has been taking it at night.    History of Present Illness  Past Medical History:   Diagnosis Date    Abnormal EKG 04/10/2024    EVAL W/CARDIOLOGY-NORMAL  STRESS TEST, NO CP OR SOA    Anxiety     Arthritis     Hypertension     Screening for malignant neoplasm of colon 01/31/2024      Family History   Problem Relation Age of Onset    Diabetes Mother     Cancer Mother     Arthritis Mother     Heart disease Father     Malig Hyperthermia Neg Hx       Past Surgical History:   Procedure Laterality Date    COLONOSCOPY      LAPAROSCOPIC TUBAL LIGATION      TOTAL KNEE ARTHROPLASTY Right 5/22/2024    Procedure: RIGHT TOTAL KNEE ARTHROPLASTY WITH LYNN ROBOT;  Surgeon: Bradley Bal MD;  Location: [facility];  Service: Robotics - Ortho;  Laterality: Right;        Current Outpatient Medications:     hydroCHLOROthiazide 12.5 MG tablet, Take 1 tablet by mouth Daily., Disp: 90 tablet, Rfl: 1    lisinopril (PRINIVIL,ZESTRIL) 40 MG tablet, Take 1 tablet by mouth Daily., Disp: 90 tablet, Rfl: 1    sertraline (Zoloft) 25 MG tablet, Take 1 tablet by mouth Daily., Disp: 30 tablet, Rfl: 1    aspirin (Ecotrin) 325 MG EC tablet, Take 1 tablet by mouth Daily. Begin after completing Eliquis, Disp: 21 tablet, Rfl: 0    meloxicam (MOBIC) 15 MG tablet, Take 1 tablet by mouth Daily for 90 days., Disp: 90 tablet, Rfl: 0    OBJECTIVE  Vital Signs:   /77   Pulse 78   Ht 167.6 cm (65.98\")   Wt 108 kg (237 lb)   LMP 10/08/2016 (Approximate) Comment: 2017  SpO2 97%   BMI 38.28 kg/m²    Estimated body mass index is 38.28 kg/m² as calculated from the " "following:    Height as of this encounter: 167.6 cm (65.98\").    Weight as of this encounter: 108 kg (237 lb).     Wt Readings from Last 3 Encounters:   08/20/24 107 kg (235 lb)   08/19/24 108 kg (237 lb)   07/09/24 106 kg (234 lb)     BP Readings from Last 3 Encounters:   08/20/24 126/86   08/19/24 130/77   07/09/24 119/82       Physical Exam  Vitals reviewed.   Constitutional:       Appearance: Normal appearance. She is well-developed.   HENT:      Head: Normocephalic and atraumatic.      Right Ear: External ear normal.      Left Ear: External ear normal.   Eyes:      Conjunctiva/sclera: Conjunctivae normal.      Pupils: Pupils are equal, round, and reactive to light.   Cardiovascular:      Rate and Rhythm: Normal rate and regular rhythm.      Heart sounds: No murmur heard.     No friction rub. No gallop.   Pulmonary:      Effort: Pulmonary effort is normal.      Breath sounds: Normal breath sounds. No wheezing or rhonchi.   Skin:     General: Skin is warm and dry.   Neurological:      Mental Status: She is alert and oriented to person, place, and time.      Cranial Nerves: No cranial nerve deficit.   Psychiatric:         Mood and Affect: Mood and affect normal.         Behavior: Behavior normal.         Thought Content: Thought content normal.         Judgment: Judgment normal.          Result Review    CMP          2/26/2024    06:35 4/10/2024    10:26 5/15/2024    09:26   CMP   Glucose 96  102  167    BUN 19  18  15    Creatinine 0.85  0.87  0.87    EGFR 78.5  76.4  76.4    Sodium 144  137  136    Potassium 4.0  4.0  3.7    Chloride 106  102  100    Calcium 10.3  10.3  10.0    Total Protein 6.8  7.2  7.2    Albumin 4.4  4.4  4.3    Globulin 2.4  2.8  2.9    Total Bilirubin 0.6  0.7  0.7    Alkaline Phosphatase 71  81  84    AST (SGOT) 17  21  18    ALT (SGPT) 29  28  20    Albumin/Globulin Ratio 1.8  1.6  1.5    BUN/Creatinine Ratio 22.4  20.7  17.2    Anion Gap 12.0  11.5  9.9      CBC          4/10/2024    " 10:26 5/15/2024    09:26 5/23/2024    04:02   CBC   WBC 8.84  8.73     RBC 5.03  4.74     Hemoglobin 14.6  14.2  12.2    Hematocrit 45.0  42.5  37.6    MCV 89.5  89.7     MCH 29.0  30.0     MCHC 32.4  33.4     RDW 13.2  13.2     Platelets 309  284       Lipid Panel          2/26/2024    06:35   Lipid Panel   Total Cholesterol 192    Triglycerides 180    HDL Cholesterol 41    VLDL Cholesterol 32    LDL Cholesterol  119    LDL/HDL Ratio 2.80      TSH          2/26/2024    06:35   TSH   TSH 1.260      Most Recent A1C          5/15/2024    09:26   HGBA1C Most Recent   Hemoglobin A1C 5.60        A1C Last 3 Results          2/26/2024    06:35 4/10/2024    10:26 5/15/2024    09:26   HGBA1C Last 3 Results   Hemoglobin A1C 5.80  5.80  5.60      Lab Results   Component Value Date    FINX87ZV 34.3 02/02/2022        Lab Results   Component Value Date    FREET4 1.1 05/20/2021     Lab Results   Component Value Date    LNPDYRYJ90 443 02/02/2022       XR Knee 1 or 2 View Right    Result Date: 5/22/2024  Impression: 1.  Status post knee arthroplasty. 2.  No immediate postoperative complication is identified.   Electronically Signed By-Caleb Johnson MD On:5/22/2024 11:15 AM         The above data has been reviewed by AYAN Garzon 08/19/2024 10:32 EDT.          Patient Care Team:  Ashly Andrew APRN as PCP - General (Nurse Practitioner)            ASSESSMENT & PLAN    Diagnoses and all orders for this visit:    1. Anxiety and depression (Primary)  Assessment & Plan:  Patient reports significant improvement in symptoms, is more mobile now that she has further recovered from her knee surgery and she thinks that this has helped with her depression tremendously.  Patient would like to try stopping the Zoloft as she feels it is interfering with her sleep.  Discussed she could potentially try switching it to the morning if she would like, but patient would prefer to stop.  She is going to trial discontinuation of the  medication, discussed that if her anxiety or depression symptoms recur I recommend retrialing it in the morning and follow-up.  Patient verbalized understanding      2. Primary hypertension  Overview:  Hypertension is stable, continue current dose of medication           Tobacco Use: High Risk (8/20/2024)    Patient History     Smoking Tobacco Use: Every Day     Smokeless Tobacco Use: Never     Passive Exposure: Current       Follow Up     Return if symptoms worsen or fail to improve.        Patient was given instructions and counseling regarding her condition or for health maintenance advice. Please see specific information pulled into the AVS if appropriate.   I have reviewed information obtained and documented by others and I have confirmed the accuracy of this documented note.    AYAN Garzon

## 2024-08-20 ENCOUNTER — OFFICE VISIT (OUTPATIENT)
Dept: ORTHOPEDIC SURGERY | Facility: CLINIC | Age: 59
End: 2024-08-20
Payer: COMMERCIAL

## 2024-08-20 VITALS
HEIGHT: 66 IN | BODY MASS INDEX: 37.77 KG/M2 | OXYGEN SATURATION: 96 % | SYSTOLIC BLOOD PRESSURE: 126 MMHG | HEART RATE: 82 BPM | WEIGHT: 235 LBS | DIASTOLIC BLOOD PRESSURE: 86 MMHG

## 2024-08-20 DIAGNOSIS — Z47.89 AFTERCARE FOLLOWING SURGERY OF THE MUSCULOSKELETAL SYSTEM: Primary | ICD-10-CM

## 2024-08-20 PROCEDURE — 99213 OFFICE O/P EST LOW 20 MIN: CPT | Performed by: PHYSICIAN ASSISTANT

## 2024-08-20 RX ORDER — MELOXICAM 15 MG/1
15 TABLET ORAL DAILY
Qty: 90 TABLET | Refills: 0 | Status: SHIPPED | OUTPATIENT
Start: 2024-08-20 | End: 2024-11-18

## 2024-08-20 NOTE — ASSESSMENT & PLAN NOTE
Patient reports significant improvement in symptoms, is more mobile now that she has further recovered from her knee surgery and she thinks that this has helped with her depression tremendously.  Patient would like to try stopping the Zoloft as she feels it is interfering with her sleep.  Discussed she could potentially try switching it to the morning if she would like, but patient would prefer to stop.  She is going to trial discontinuation of the medication, discussed that if her anxiety or depression symptoms recur I recommend retrialing it in the morning and follow-up.  Patient verbalized understanding

## 2024-08-20 NOTE — PROGRESS NOTES
Chief Complaint  Follow-up of the Right Knee    Subjective          History of Present Illness      Jewels Beltrán is a 58 y.o. female  presents to [facility] for     Patient presents for her 3-month postoperative evaluation of right total knee arthroplasty 5/22/2024.  Patient states that she has returned to work since August 6.  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  She finished her therapy due to her insurance stopping it and not giving her more visits she has not had therapy for few weeks.  She states she has continued the home exercises.  She stopped the pain medication but is requesting a refill of meloxicam she found this to be helpful in the past.  He states she is ambulating without a cane or walker denies locking catching buckling of the knee.  Denies swelling.  She does state that it feels heavy.      No Known Allergies     Social History     Socioeconomic History    Marital status:    Tobacco Use    Smoking status: Every Day     Current packs/day: 1.00     Average packs/day: 1 pack/day for 44.6 years (44.6 ttl pk-yrs)     Types: Cigarettes     Start date: 1/1/1980     Passive exposure: Current    Smokeless tobacco: Never   Vaping Use    Vaping status: Never Used   Substance and Sexual Activity    Alcohol use: Yes     Alcohol/week: 2.0 standard drinks of alcohol     Types: 2 Drinks containing 0.5 oz of alcohol per week     Comment: 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   Cardiovascular: Denies chest pain, shortness of breath  Skin: Denies  "rashes, acute skin changes  Neurologic: Denies headache, loss of consciousness  MSK: Right knee pain      Objective   Vital Signs:   /86   Pulse 82   Ht 167.6 cm (65.98\")   Wt 107 kg (235 lb)   SpO2 96%   BMI 37.95 kg/m²     Body mass index is 37.95 kg/m².    Physical Exam       Right knee: Well-healed incision, no erythema, no ecchymosis, no swelling, no effusion, no fluctuance or signs of infection, full extension flexion 120, stable to varus/valgus stress stable anterior/posterior drawer, nontender calf, negative Juve testing      Procedures    Imaging Results (Most Recent)       Procedure Component Value Units Date/Time    XR Knee 3 View Right [[id number]] Resulted: 08/20/24 0900     Updated: 08/20/24 0900    Narrative:      View:AP/Lateral and [place] view(s)  Site: Right knee  Indication: Right knee pain  Study: X-rays ordered, taken in the office, and reviewed today  X-ray: Intact appearing right total knee arthroplasty, no signs of   hardware failure or loosening, no subsidence or periprosthetic fracture,   good alignment  Comparative data: Previous studies             Result Review :   The following data was reviewed by: ERIKA uL on 08/20/2024:  Data reviewed : Radiologic studies reviewed by me with the patient              Assessment and Plan    Diagnoses and all orders for this visit:    1. Aftercare following surgery of right total knee arthroplasty 5/22/2024 (Primary)  -     XR Knee 3 View Right    Other orders  -     meloxicam (MOBIC) 15 MG tablet; Take 1 tablet by mouth Daily for 90 days.  Dispense: 90 tablet; Refill: 0        Reviewed x-rays with the patient discussed diagnosis and treatment options with her she was advised to continue weightbearing and home exercises as tolerated follow-up in 3 months with x-rays    Call or return if worsening symptoms.    Follow Up   Return in about 3 months (around 11/20/2024) for Recheck.  Patient was given instructions and " counseling regarding her condition or for health maintenance advice. Please see specific information pulled into the AVS if appropriate.       EMR Dragon/Transcription disclaimer:  Part of this note may be an electronic transcription/translation of spoken language to printed text using the Dragon Dictation System

## 2024-09-16 ENCOUNTER — HOSPITAL ENCOUNTER (OUTPATIENT)
Dept: MAMMOGRAPHY | Facility: HOSPITAL | Age: 59
Discharge: HOME | End: 2024-09-16
Payer: COMMERCIAL

## 2024-09-16 ENCOUNTER — HOSPITAL ENCOUNTER (OUTPATIENT)
Dept: CT IMAGING | Facility: HOSPITAL | Age: 59
Discharge: HOME | End: 2024-09-16
Payer: COMMERCIAL

## 2024-09-16 DIAGNOSIS — Z12.2 ENCOUNTER FOR SCREENING FOR LUNG CANCER: ICD-10-CM

## 2024-09-16 DIAGNOSIS — Z12.31 BREAST CANCER SCREENING BY MAMMOGRAM: ICD-10-CM

## 2024-09-16 DIAGNOSIS — F17.219 CIGARETTE NICOTINE DEPENDENCE WITH NICOTINE-INDUCED DISORDER: ICD-10-CM

## 2024-09-16 PROCEDURE — 77063 BREAST TOMOSYNTHESIS BI: CPT

## 2024-09-16 PROCEDURE — 77067 SCR MAMMO BI INCL CAD: CPT

## 2024-09-16 PROCEDURE — 71271 CT THORAX LUNG CANCER SCR C-: CPT

## 2024-10-07 ENCOUNTER — LAB (OUTPATIENT)
Dept: LAB | Facility: HOSPITAL | Age: 59
End: 2024-10-07
Payer: COMMERCIAL

## 2024-10-07 DIAGNOSIS — F41.9 ANXIETY AND DEPRESSION: ICD-10-CM

## 2024-10-07 DIAGNOSIS — I10 PRIMARY HYPERTENSION: ICD-10-CM

## 2024-10-07 DIAGNOSIS — Z13.29 THYROID DISORDER SCREEN: ICD-10-CM

## 2024-10-07 DIAGNOSIS — F32.A ANXIETY AND DEPRESSION: ICD-10-CM

## 2024-10-07 LAB
ALBUMIN SERPL-MCNC: 4.1 G/DL (ref 3.5–5.2)
ALBUMIN/GLOB SERPL: 1.6 G/DL
ALP SERPL-CCNC: 85 U/L (ref 39–117)
ALT SERPL W P-5'-P-CCNC: 21 U/L (ref 1–33)
ANION GAP SERPL CALCULATED.3IONS-SCNC: 8.8 MMOL/L (ref 5–15)
AST SERPL-CCNC: 18 U/L (ref 1–32)
BASOPHILS # BLD AUTO: 0.06 10*3/MM3 (ref 0–0.2)
BASOPHILS NFR BLD AUTO: 0.8 % (ref 0–1.5)
BILIRUB SERPL-MCNC: 0.4 MG/DL (ref 0–1.2)
BUN SERPL-MCNC: 19 MG/DL (ref 8–23)
BUN/CREAT SERPL: 20.9 (ref 7–25)
CALCIUM SPEC-SCNC: 10.1 MG/DL (ref 8.6–10.5)
CHLORIDE SERPL-SCNC: 105 MMOL/L (ref 98–107)
CHOLEST SERPL-MCNC: 183 MG/DL (ref 0–200)
CO2 SERPL-SCNC: 26.2 MMOL/L (ref 22–29)
CREAT SERPL-MCNC: 0.91 MG/DL (ref 0.57–1)
DEPRECATED RDW RBC AUTO: 44.7 FL (ref 37–54)
EGFRCR SERPLBLD CKD-EPI 2021: 72.4 ML/MIN/1.73
EOSINOPHIL # BLD AUTO: 0.19 10*3/MM3 (ref 0–0.4)
EOSINOPHIL NFR BLD AUTO: 2.6 % (ref 0.3–6.2)
ERYTHROCYTE [DISTWIDTH] IN BLOOD BY AUTOMATED COUNT: 13.2 % (ref 12.3–15.4)
GLOBULIN UR ELPH-MCNC: 2.6 GM/DL
GLUCOSE SERPL-MCNC: 107 MG/DL (ref 65–99)
HCT VFR BLD AUTO: 44.2 % (ref 34–46.6)
HDLC SERPL-MCNC: 42 MG/DL (ref 40–60)
HGB BLD-MCNC: 14.1 G/DL (ref 12–15.9)
IMM GRANULOCYTES # BLD AUTO: 0.01 10*3/MM3 (ref 0–0.05)
IMM GRANULOCYTES NFR BLD AUTO: 0.1 % (ref 0–0.5)
LDLC SERPL CALC-MCNC: 122 MG/DL (ref 0–100)
LDLC/HDLC SERPL: 2.87 {RATIO}
LYMPHOCYTES # BLD AUTO: 1.91 10*3/MM3 (ref 0.7–3.1)
LYMPHOCYTES NFR BLD AUTO: 25.6 % (ref 19.6–45.3)
MCH RBC QN AUTO: 29.2 PG (ref 26.6–33)
MCHC RBC AUTO-ENTMCNC: 31.9 G/DL (ref 31.5–35.7)
MCV RBC AUTO: 91.5 FL (ref 79–97)
MONOCYTES # BLD AUTO: 0.77 10*3/MM3 (ref 0.1–0.9)
MONOCYTES NFR BLD AUTO: 10.3 % (ref 5–12)
NEUTROPHILS NFR BLD AUTO: 4.51 10*3/MM3 (ref 1.7–7)
NEUTROPHILS NFR BLD AUTO: 60.6 % (ref 42.7–76)
NRBC BLD AUTO-RTO: 0 /100 WBC (ref 0–0.2)
PLATELET # BLD AUTO: 287 10*3/MM3 (ref 140–450)
PMV BLD AUTO: 11.9 FL (ref 6–12)
POTASSIUM SERPL-SCNC: 4.3 MMOL/L (ref 3.5–5.2)
PROT SERPL-MCNC: 6.7 G/DL (ref 6–8.5)
RBC # BLD AUTO: 4.83 10*6/MM3 (ref 3.77–5.28)
SODIUM SERPL-SCNC: 140 MMOL/L (ref 136–145)
TRIGL SERPL-MCNC: 102 MG/DL (ref 0–150)
TSH SERPL DL<=0.05 MIU/L-ACNC: 1.49 UIU/ML (ref 0.27–4.2)
VLDLC SERPL-MCNC: 19 MG/DL (ref 5–40)
WBC NRBC COR # BLD AUTO: 7.45 10*3/MM3 (ref 3.4–10.8)

## 2024-10-07 PROCEDURE — 80061 LIPID PANEL: CPT

## 2024-10-07 PROCEDURE — 36415 COLL VENOUS BLD VENIPUNCTURE: CPT

## 2024-10-07 PROCEDURE — 80050 GENERAL HEALTH PANEL: CPT

## 2024-11-18 RX ORDER — MELOXICAM 15 MG/1
15 TABLET ORAL DAILY
Qty: 90 TABLET | Refills: 0 | Status: SHIPPED | OUTPATIENT
Start: 2024-11-18

## 2024-11-25 ENCOUNTER — OFFICE VISIT (OUTPATIENT)
Dept: ORTHOPEDIC SURGERY | Facility: CLINIC | Age: 59
End: 2024-11-25
Payer: COMMERCIAL

## 2024-11-25 VITALS
OXYGEN SATURATION: 96 % | WEIGHT: 220 LBS | DIASTOLIC BLOOD PRESSURE: 90 MMHG | HEIGHT: 66 IN | HEART RATE: 62 BPM | BODY MASS INDEX: 35.36 KG/M2 | SYSTOLIC BLOOD PRESSURE: 139 MMHG

## 2024-11-25 DIAGNOSIS — M25.561 RIGHT KNEE PAIN, UNSPECIFIED CHRONICITY: ICD-10-CM

## 2024-11-25 DIAGNOSIS — Z47.89 AFTERCARE FOLLOWING SURGERY OF THE MUSCULOSKELETAL SYSTEM: Primary | ICD-10-CM

## 2024-11-25 NOTE — PROGRESS NOTES
"Chief Complaint  Follow-up of the Right Knee    Subjective          History of Present Illness      Jewels Beltrán is a 58 y.o. female  presents to [facility] for     Patient presents for follow-up evaluation of right total knee arthroplasty 5/22/2024.  Patient states she is happy with her knee replacement she states that may be stiff and feels tight at times.  She states that she has no pain and denies need for pain medication or NSAIDs.  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  She denies locking catching buckling of the knee.  She denies swelling.      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   Cardiovascular: Denies chest pain, shortness of breath  Skin: Denies rashes, acute skin changes  Neurologic: Denies headache, loss of consciousness  MSK: Right knee pain      Objective   Vital Signs:   /90   Pulse 62   Ht 167.6 cm (65.98\")   Wt 99.8 kg (220 lb)   SpO2 96%   BMI 35.53 kg/m²     Body mass index is 35.53 " kg/m².    Physical Exam       Right knee: Full extension, flexion 120, stable anterior/posterior drawer, stable to varus/valgus stress.  Nontender to palpation, no pain with range of motion.  5 out of 5 strength, no pain with resisted range of motion      Procedures    Imaging Results (Most Recent)       Procedure Component Value Units Date/Time    XR Knee 3 View Right [[id number]] Resulted: 11/25/24 0833     Updated: 11/25/24 0833    Narrative:      View:AP/Lateral and [place] view(s)  Site: Right knee  Indication: Right knee pain  Study: X-rays ordered, taken in the office, and reviewed today  X-ray: Intact appearing right total knee arthroplasty, no signs of   hardware failure or loosening, no subsidence or periprosthetic fracture,   good alignment  Comparative data: Previous studies             Result Review :   The following data was reviewed by: ERIKA Lu on 11/25/2024:  Data reviewed : Radiologic studies reviewed by me with the patient              Assessment and Plan    Diagnoses and all orders for this visit:    1. Aftercare following surgery of right total knee arthroplasty 5/22/2024 (Primary)    2. Right knee pain, unspecified chronicity  -     XR Knee 3 View Right        Reviewed x-rays with the patient discussed diagnosis and treatment options with her she was advised to continue weightbearing and activity as tolerated, follow-up in 6 months for recheck with x-rays    Call or return if worsening symptoms.    Follow Up   Return in about 6 months (around 5/25/2025) for Recheck.  Patient was given instructions and counseling regarding her condition or for health maintenance advice. Please see specific information pulled into the AVS if appropriate.       EMR Dragon/Transcription disclaimer:  Part of this note may be an electronic transcription/translation of spoken language to printed text using the Dragon Dictation System

## 2024-12-26 DIAGNOSIS — I10 PRIMARY HYPERTENSION: ICD-10-CM

## 2024-12-26 RX ORDER — LISINOPRIL 40 MG/1
40 TABLET ORAL DAILY
Qty: 90 TABLET | Refills: 0 | Status: SHIPPED | OUTPATIENT
Start: 2024-12-26

## 2024-12-26 RX ORDER — HYDROCHLOROTHIAZIDE 12.5 MG/1
12.5 TABLET ORAL DAILY
Qty: 90 TABLET | Refills: 0 | Status: SHIPPED | OUTPATIENT
Start: 2024-12-26

## 2025-02-11 RX ORDER — MELOXICAM 15 MG/1
15 TABLET ORAL DAILY
Qty: 90 TABLET | Refills: 0 | Status: SHIPPED | OUTPATIENT
Start: 2025-02-11

## 2025-02-12 ENCOUNTER — PREP FOR SURGERY (OUTPATIENT)
Dept: OTHER | Facility: HOSPITAL | Age: 60
End: 2025-02-12
Payer: COMMERCIAL

## 2025-02-12 ENCOUNTER — OFFICE VISIT (OUTPATIENT)
Dept: SURGERY | Facility: CLINIC | Age: 60
End: 2025-02-12
Payer: COMMERCIAL

## 2025-02-12 VITALS
HEART RATE: 65 BPM | DIASTOLIC BLOOD PRESSURE: 95 MMHG | HEIGHT: 66 IN | SYSTOLIC BLOOD PRESSURE: 144 MMHG | BODY MASS INDEX: 40 KG/M2 | OXYGEN SATURATION: 98 % | WEIGHT: 248.9 LBS

## 2025-02-12 DIAGNOSIS — Z86.0100 HISTORY OF COLONIC POLYPS: ICD-10-CM

## 2025-02-12 DIAGNOSIS — Z12.11 SCREENING FOR MALIGNANT NEOPLASM OF COLON: Primary | ICD-10-CM

## 2025-02-12 RX ORDER — SODIUM CHLORIDE 0.9 % (FLUSH) 0.9 %
10 SYRINGE (ML) INJECTION AS NEEDED
OUTPATIENT
Start: 2025-02-12

## 2025-02-12 RX ORDER — SODIUM CHLORIDE 0.9 % (FLUSH) 0.9 %
3 SYRINGE (ML) INJECTION EVERY 12 HOURS SCHEDULED
OUTPATIENT
Start: 2025-02-12

## 2025-02-12 RX ORDER — AMOXICILLIN 500 MG/1
CAPSULE ORAL
COMMUNITY
Start: 2025-02-06

## 2025-02-12 RX ORDER — SODIUM CHLORIDE 9 MG/ML
40 INJECTION, SOLUTION INTRAVENOUS AS NEEDED
OUTPATIENT
Start: 2025-02-12

## 2025-02-12 NOTE — PROGRESS NOTES
Chief Complaint: Colonoscopy    Subjective      Colonoscopy consultation       History of Present Illness  Jewels Beltrán is a 59 y.o. female presents to [facility] for colonoscopy consultation.    Patient presents today on referral from Dr. Darek Cárdenas     Patient presents today without complaints for screening colonoscopy.  She denies any abdominal pain, change in bowel habit, or rectal bleeding.  Denies any family history of colorectal cancer.  Admits to history of colonic polyps.    Denies RITO.  Denies any cardiac issues.  Denies taking any GLP-1 receptors.      1/18: Colonoscopy (Melia):  normal colon.      12/14: Colonoscopy (Figert): Sigmoid - polyps removed. (No pathology).        Objective     Past Medical History:   Diagnosis Date    Abnormal EKG 04/10/2024    EVAL W/CARDIOLOGY-NORMAL  STRESS TEST, NO CP OR SOA    Anxiety     Arthritis     Hypertension     Screening for malignant neoplasm of colon 01/31/2024       Past Surgical History:   Procedure Laterality Date    COLONOSCOPY      LAPAROSCOPIC TUBAL LIGATION      TOTAL KNEE ARTHROPLASTY Right 5/22/2024    Procedure: RIGHT TOTAL KNEE ARTHROPLASTY WITH LYNN ROBOT;  Surgeon: Bradley Bal MD;  Location: [facility] OR;  Service: Robotics - Ortho;  Laterality: Right;       Outpatient Medications Marked as Taking for the 2/12/25 encounter (Office Visit) with Marquis April, APRN   Medication Sig Dispense Refill    amoxicillin (AMOXIL) 500 MG capsule For dental work      aspirin (Ecotrin) 325 MG EC tablet Take 1 tablet by mouth Daily. Begin after completing Eliquis 21 tablet 0    hydroCHLOROthiazide 12.5 MG tablet TAKE 1 TABLET BY MOUTH DAILY 90 tablet 0    lisinopril (PRINIVIL,ZESTRIL) 40 MG tablet TAKE 1 TABLET BY MOUTH DAILY 90 tablet 0    meloxicam (MOBIC) 15 MG tablet TAKE 1 TABLET BY MOUTH DAILY 90 tablet 0       No Known Allergies     Family History   Problem Relation Age of Onset    Diabetes Mother     Cancer  "Mother     Arthritis Mother     Heart disease Father     Malig Hyperthermia Neg Hx        Social History     Socioeconomic History    Marital status:    Tobacco Use    Smoking status: Every Day     Current packs/day: 1.00     Average packs/day: 1 pack/day for 45.1 years (45.1 ttl pk-yrs)     Types: Cigarettes     Start date: 1/1/1980     Passive exposure: Current    Smokeless tobacco: Never   Vaping Use    Vaping status: Never Used   Substance and Sexual Activity    Alcohol use: Yes     Alcohol/week: 2.0 standard drinks of alcohol     Types: 2 Drinks containing 0.5 oz of alcohol per week     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   Gastrointestinal:  Negative for abdominal distention, abdominal pain, anal bleeding, blood in stool, constipation, diarrhea and rectal pain.        Vital Signs:   /95 (BP Location: Right arm, Patient Position: Sitting, Cuff Size: Large Adult)   Pulse 65   Ht 167.6 cm (65.98\")   Wt 113 kg (248 lb 14.4 oz)   SpO2 98%   BMI 40.20 kg/m²      Physical Exam  Vitals and nursing note reviewed.   Constitutional:       General: She is not in acute distress.     Appearance: Normal appearance. She is not ill-appearing.   HENT:      Head: Normocephalic and atraumatic.   Cardiovascular:      Rate and Rhythm: Normal rate.   Pulmonary:      Effort: Pulmonary effort is normal.      Breath sounds: No stridor.   Abdominal:      Palpations: Abdomen is soft.      Tenderness: There is no guarding.   Musculoskeletal:         General: No deformity. Normal range of motion.   Skin:     General: Skin is warm and dry.      Coloration: Skin is not jaundiced.   Neurological:      General: No focal deficit present.      Mental Status: She is alert and oriented to person, place, and time.   Psychiatric:         Mood and Affect: Mood normal.         Thought Content: Thought " content normal.          Result Review :          []  Laboratory  []  Radiology  []  Pathology  []  Microbiology  []  EKG/Telemetry   []  Cardiology/Vascular   []  Old records  I spent 15 minutes caring for Jewels on this date of service. This time includes time spent by me in the following activities: reviewing tests, obtaining and/or reviewing a separately obtained history, performing a medically appropriate examination and/or evaluation, ordering medications, tests, or procedures, and documenting information in the medical record        Assessment and Plan    Diagnoses and all orders for this visit:    1. Screening for malignant neoplasm of colon (Primary)        Follow Up   Return for Schedule colonoscopy with Dr. Ball on 3/17/2025 [facility].    Hospital arrival time: 11:00    Possible risks/complications, benefits, and alternatives to surgical or invasive procedures have been explained to patient and/or legal guardian.    Patient has been evaluated and can tolerate anesthesia and/or sedation. Risks, benefits, and alternatives to anesthesia and sedation have been explained to the patient and/or legal guardian. Patient verbalizes understanding and is willing to proceed with the above plan.     Patient was given instructions and counseling regarding her condition or for health maintenance advice. Please see specific information pulled into the AVS if appropriate.     As always, it has been a pleasure to participate in your patient's care. Please call with questions or concerns.

## 2025-03-14 ENCOUNTER — ANESTHESIA EVENT (OUTPATIENT)
Dept: GASTROENTEROLOGY | Facility: HOSPITAL | Age: 60
End: 2025-03-14
Payer: COMMERCIAL

## 2025-03-14 NOTE — ANESTHESIA PREPROCEDURE EVALUATION
Anesthesia Evaluation     Nursing notes reviewed   NPO Solid Status: > 8 hours  NPO Liquid Status: > 4 hours           Airway   Mallampati: I  TM distance: >3 FB  Neck ROM: full  No difficulty expected  Dental - normal exam     Pulmonary     breath sounds clear to auscultation  Cardiovascular - normal exam  Exercise tolerance: good (4-7 METS)    Rhythm: regular  Rate: normal    (+) hypertension well controlled      Neuro/Psych  (+) headaches, numbness, psychiatric history Anxiety and Depression  GI/Hepatic/Renal/Endo    (+) obesity, morbid obesity, thyroid problem     Musculoskeletal     Abdominal    Substance History      OB/GYN          Other   arthritis,     ROS/Med Hx Other:  ABNORMAL ECG -  Sinus rhythm  When compared with ECG of 12-Oct-2021 19:25:24,  New or worsened ischemia or infarction  Electronically Signed By: Juan Pablo Orantes ([facility]) 15-Apr-2024 11:10:02  Date and Time of Study: 2024-04-10 08:20:53    ECHO 2021:   ·Left ventricular ejection fraction appears to be 61 - 65%.  ·Left ventricular diastolic function was normal.  ·No evidence of significant valvular disease    Stress test 2024:   ·  Myocardial perfusion imaging indicates a normal myocardial perfusion study with no evidence of ischemia. Impressions are consistent with a low risk study.  ·  Left ventricular ejection fraction is normal (Calculated EF = 63%).  ·  Findings consistent with a normal ECG stress test.                     Anesthesia Plan    ASA 3     general   total IV anesthesia  (Total IV Anesthesia    Patient understands anesthesia not responsible for dental damage.      Discussed risks with pt including aspiration, allergic reactions, apnea, advanced airway placement. Pt verbalized understanding. All questions answered.     )  intravenous induction     Anesthetic plan, risks, benefits, and alternatives have been provided, discussed and informed consent has been obtained with: patient.  Pre-procedure education provided  Plan discussed  with CRNA.      CODE STATUS:

## 2025-03-17 ENCOUNTER — ANESTHESIA (OUTPATIENT)
Dept: GASTROENTEROLOGY | Facility: HOSPITAL | Age: 60
End: 2025-03-17
Payer: COMMERCIAL

## 2025-03-17 ENCOUNTER — HOSPITAL ENCOUNTER (OUTPATIENT)
Facility: HOSPITAL | Age: 60
Setting detail: HOSPITAL OUTPATIENT SURGERY
Discharge: HOME | End: 2025-03-17
Attending: STUDENT IN AN ORGANIZED HEALTH CARE EDUCATION/TRAINING PROGRAM | Admitting: STUDENT IN AN ORGANIZED HEALTH CARE EDUCATION/TRAINING PROGRAM
Payer: COMMERCIAL

## 2025-03-17 VITALS
DIASTOLIC BLOOD PRESSURE: 77 MMHG | BODY MASS INDEX: 39.59 KG/M2 | HEART RATE: 67 BPM | WEIGHT: 245.15 LBS | SYSTOLIC BLOOD PRESSURE: 107 MMHG | TEMPERATURE: 98 F | RESPIRATION RATE: 16 BRPM | OXYGEN SATURATION: 100 %

## 2025-03-17 DIAGNOSIS — Z12.11 SCREENING FOR MALIGNANT NEOPLASM OF COLON: ICD-10-CM

## 2025-03-17 DIAGNOSIS — Z86.0100 HISTORY OF COLONIC POLYPS: ICD-10-CM

## 2025-03-17 PROCEDURE — 25010000002 LIDOCAINE PF 2% 2 % SOLUTION

## 2025-03-17 PROCEDURE — 25810000003 LACTATED RINGERS PER 1000 ML

## 2025-03-17 PROCEDURE — 25010000002 PROPOFOL 10 MG/ML EMULSION

## 2025-03-17 RX ORDER — SODIUM CHLORIDE 0.9 % (FLUSH) 0.9 %
3 SYRINGE (ML) INJECTION EVERY 12 HOURS SCHEDULED
Status: DISCONTINUED | OUTPATIENT
Start: 2025-03-17 | End: 2025-03-17 | Stop reason: HOSPADM

## 2025-03-17 RX ORDER — ONDANSETRON 4 MG/1
4 TABLET, ORALLY DISINTEGRATING ORAL ONCE AS NEEDED
Status: DISCONTINUED | OUTPATIENT
Start: 2025-03-17 | End: 2025-03-17 | Stop reason: HOSPADM

## 2025-03-17 RX ORDER — SODIUM CHLORIDE 9 MG/ML
40 INJECTION, SOLUTION INTRAVENOUS AS NEEDED
Status: DISCONTINUED | OUTPATIENT
Start: 2025-03-17 | End: 2025-03-17 | Stop reason: HOSPADM

## 2025-03-17 RX ORDER — SODIUM CHLORIDE, SODIUM LACTATE, POTASSIUM CHLORIDE, CALCIUM CHLORIDE 600; 310; 30; 20 MG/100ML; MG/100ML; MG/100ML; MG/100ML
30 INJECTION, SOLUTION INTRAVENOUS CONTINUOUS
Status: DISCONTINUED | OUTPATIENT
Start: 2025-03-17 | End: 2025-03-17 | Stop reason: HOSPADM

## 2025-03-17 RX ORDER — SODIUM CHLORIDE 0.9 % (FLUSH) 0.9 %
10 SYRINGE (ML) INJECTION AS NEEDED
Status: DISCONTINUED | OUTPATIENT
Start: 2025-03-17 | End: 2025-03-17 | Stop reason: HOSPADM

## 2025-03-17 RX ORDER — PROPOFOL 10 MG/ML
VIAL (ML) INTRAVENOUS AS NEEDED
Status: DISCONTINUED | OUTPATIENT
Start: 2025-03-17 | End: 2025-03-17 | Stop reason: SURG

## 2025-03-17 RX ORDER — LIDOCAINE HYDROCHLORIDE 20 MG/ML
INJECTION, SOLUTION EPIDURAL; INFILTRATION; INTRACAUDAL; PERINEURAL AS NEEDED
Status: DISCONTINUED | OUTPATIENT
Start: 2025-03-17 | End: 2025-03-17 | Stop reason: SURG

## 2025-03-17 RX ADMIN — LIDOCAINE HYDROCHLORIDE 80 MG: 20 INJECTION, SOLUTION EPIDURAL; INFILTRATION; INTRACAUDAL; PERINEURAL at 12:45

## 2025-03-17 RX ADMIN — SODIUM CHLORIDE, SODIUM LACTATE, POTASSIUM CHLORIDE, CALCIUM CHLORIDE 30 ML/HR: 20; 30; 600; 310 INJECTION, SOLUTION INTRAVENOUS at 11:07

## 2025-03-17 RX ADMIN — PROPOFOL 100 MG: 10 INJECTION, EMULSION INTRAVENOUS at 12:45

## 2025-03-17 RX ADMIN — PROPOFOL 250 MCG/KG/MIN: 10 INJECTION, EMULSION INTRAVENOUS at 12:47

## 2025-03-17 NOTE — H&P
[facility]   GENERAL SURGERY  HISTORY AND PHYSICAL    Patient Name: Jewels Beltrán  : 1965  MRN: [id number]  Primary Care Physician:  Ashly Andrew APRN  Date of admission: 3/17/2025    Subjective     Chief Complaint:  screening colonoscopy    HPI:  Jewels Beltrán is a 59 y.o. female who presents today without complaints for screening colonoscopy.  She denies any abdominal pain, change in bowel habit, or rectal bleeding.  Denies any family history of colorectal cancer.  Admits to history of colonic polyps.     Denies RITO.  Denies any cardiac issues.  Denies taking any GLP-1 receptors.    Personal History   Allergies:  No Known Allergies    Home Medications:  Prior to Admission medications    Medication Sig Start Date End Date Taking? Authorizing Provider   aspirin (Ecotrin) 325 MG EC tablet Take 1 tablet by mouth Daily. Begin after completing Eliquis 24  Yes Bradley Bal MD   hydroCHLOROthiazide 12.5 MG tablet TAKE 1 TABLET BY MOUTH DAILY 24  Yes Ashly Andrew APRN   lisinopril (PRINIVIL,ZESTRIL) 40 MG tablet TAKE 1 TABLET BY MOUTH DAILY 24  Yes Ashly Andrew APRN   meloxicam (MOBIC) 15 MG tablet TAKE 1 TABLET BY MOUTH DAILY 25  Yes Maxx Perdomo PA   amoxicillin (AMOXIL) 500 MG capsule For dental work 25   Provider, MD Kalyan   sertraline (Zoloft) 25 MG tablet Take 1 tablet by mouth Daily.  Patient not taking: Reported on 2025 7/3/24 3/17/25  Ashly Andrew APRN       Past Medical History:   Diagnosis Date    Abnormal EKG 04/10/2024    EVAL W/CARDIOLOGY-NORMAL  STRESS TEST, NO CP OR SOA    Anxiety     Arthritis     Hypertension     Screening for malignant neoplasm of colon 2024    Sleep apnea     postitonal       Past Surgical History:   Procedure Laterality Date    COLONOSCOPY      LAPAROSCOPIC TUBAL LIGATION      TOTAL KNEE ARTHROPLASTY Right 2024    Procedure: RIGHT TOTAL KNEE ARTHROPLASTY WITH  LYNN GUERIN;  Surgeon: Bradley Bal MD;  Location: [facility];  Service: Robotics - Ortho;  Laterality: Right;       Social History:  reports that she has been smoking cigarettes. She started smoking about 45 years ago. She has a 45.2 pack-year smoking history. She has been exposed to tobacco smoke. She has never used smokeless tobacco. She reports current alcohol use of about 2.0 standard drinks of alcohol per week. She reports that she does not use drugs.    Family History: family history includes Arthritis in her mother; Cancer in her mother; Diabetes in her mother; Heart disease in her father. Otherwise pertinent FHx was reviewed and not pertinent to current issue.    Review of Systems: Review of systems is noncontributory besides as mentioned in above HPI section.   All systems were reviewed and negative except for: none    Objective   Vitals:   Temp:  [97.5 °F (36.4 °C)] 97.5 °F (36.4 °C)  Heart Rate:  [75] 75  Resp:  [16] 16  BP: (126)/(68) 126/68  Physical Exam   Constitutional: healthy appearing, alert, no acute distress, reliable historian  HENT:  NCAT, no visible deformities or lesions  Eyes:  sclerae clear, conjunctivae clear, EOMI  Neck:  normal appearance, no masses, trachea midline  Respiratory:  breathing not labored, respiratory effort appears normal  Cardiovascular:  heart regular rate and rhythm  Abdomen:  soft, nontender, nondistended    Skin and subcutaneous tissue:  no visible concerning rashes or lesions, no jaundice  Musculoskeletal: moving all extremities symmetrically and purposefully  Neurologic:  no obvious motor or sensory deficits, cerebellar function without any obvious abnormalities, alert & oriented x 3, speech clear  Psychiatric:  judgment and insight intact, mood normal, affect appropriate, cooperative    CBC          5/15/2024    09:26 5/23/2024    04:02 10/7/2024    06:42   CBC   WBC 8.73   7.45    RBC 4.74   4.83    Hemoglobin 14.2  12.2  14.1    Hematocrit 42.5  37.6  " 44.2    MCV 89.7   91.5    MCH 30.0   29.2    MCHC 33.4   31.9    RDW 13.2   13.2    Platelets 284   287      CMP          4/10/2024    10:26 5/15/2024    09:26 10/7/2024    06:42   CMP   Glucose 102  167  107    BUN 18  15  19    Creatinine 0.87  0.87  0.91    EGFR 76.4  76.4  72.4    Sodium 137  136  140    Potassium 4.0  3.7  4.3    Chloride 102  100  105    Calcium 10.3  10.0  10.1    Total Protein 7.2  7.2  6.7    Albumin 4.4  4.3  4.1    Globulin 2.8  2.9  2.6    Total Bilirubin 0.7  0.7  0.4    Alkaline Phosphatase 81  84  85    AST (SGOT) 21  18  18    ALT (SGPT) 28  20  21    Albumin/Globulin Ratio 1.6  1.5  1.6    BUN/Creatinine Ratio 20.7  17.2  20.9    Anion Gap 11.5  9.9  8.8                    Invalid input(s): \"PROT\"      Lab Results   Lab Value Date/Time    LIPASE 31 06/18/2021 1811     Lab Results   Lab Value Date/Time    INR 0.97 05/15/2024 0926    INR 0.91 04/10/2024 1026    INR 0.89 02/26/2024 0635     No results found for: \"LACTATE\"    Radiology:  No orders to display          LABS:  Lab Results (last 48 hours)       ** No results found for the last 48 hours. **            IMAGING:  Imaging Results (Last 48 Hours)       ** No results found for the last 48 hours. **            Result Review:  I have personally reviewed the following checkmarked results from the time of this admission to 3/17/2025 11:26 EDT:  [x]  Laboratory  []  Microbiology  []  Radiology  []  EKG/Telemetry   []  Cardiology/Vascular   []  Pathology  []  Old records  []  Other:        Assessment & Plan   Assessment / Plan     Assessment:  Active Hospital Problems:  Active Hospital Problems    Diagnosis     Screening for malignant neoplasm of colon     History of colonic polyps        Plan:   1. Screening for malignant neoplasm of colon (Primary)     Plan today for screening colonoscopy with possible biopsy and any other indicated procedure.  Risks/benefits/alternatives of the procedure were explained to the patient and she was " agreeable to proceed.      Electronically signed by Imer Ball MD, 03/17/25, 11:26 AM EDT.

## 2025-03-17 NOTE — ANESTHESIA POSTPROCEDURE EVALUATION
Patient: Jewels NORTON Hartlage    Procedure Summary       Date: 03/17/25 Room / Location: [facility] ENDOSCOPY 3 / [facility] ENDOSCOPY    Anesthesia Start: 1244 Anesthesia Stop: 1311    Procedure: COLONOSCOPY Diagnosis:       Screening for malignant neoplasm of colon      History of colonic polyps      (Screening for malignant neoplasm of colon [Z12.11])      (History of colonic polyps [Z86.0100])    Surgeons: Imer Ball MD Provider: Gayla Allen CRNA    Anesthesia Type: general ASA Status: 3            Anesthesia Type: general    Vitals  Vitals Value Taken Time   /77 03/17/25 13:35   Temp 36.7 °C (98 °F) 03/17/25 13:10   Pulse 70 03/17/25 13:36   Resp 16 03/17/25 13:35   SpO2 95 % 03/17/25 13:36   Vitals shown include unfiled device data.        Post Anesthesia Care and Evaluation    Post-procedure mental status: acceptable.  Pain management: satisfactory to patient    Airway patency: patent  Anesthetic complications: No anesthetic complications    Cardiovascular status: acceptable  Respiratory status: acceptable    Comments: Per chart review

## 2025-03-27 ENCOUNTER — TELEPHONE (OUTPATIENT)
Dept: SURGERY | Facility: CLINIC | Age: 60
End: 2025-03-27
Payer: COMMERCIAL

## 2025-03-27 NOTE — TELEPHONE ENCOUNTER
----- Message from Imer Ball sent at 3/17/2025  1:06 PM EDT -----  10 year colon follow up  Patient aware     Date of most recent Colonoscopy: 3/17/2025  Date of next expected Colonoscopy: 3/17/2035    Recall has been entered into the patient's chart and Care Gap has been updated

## 2025-03-31 DIAGNOSIS — I10 PRIMARY HYPERTENSION: ICD-10-CM

## 2025-03-31 RX ORDER — LISINOPRIL 40 MG/1
40 TABLET ORAL DAILY
Qty: 90 TABLET | Refills: 0 | Status: SHIPPED | OUTPATIENT
Start: 2025-03-31

## 2025-03-31 RX ORDER — HYDROCHLOROTHIAZIDE 12.5 MG/1
12.5 TABLET ORAL DAILY
Qty: 90 TABLET | Refills: 0 | Status: SHIPPED | OUTPATIENT
Start: 2025-03-31

## 2025-04-02 ENCOUNTER — OFFICE VISIT (OUTPATIENT)
Dept: FAMILY MEDICINE CLINIC | Facility: CLINIC | Age: 60
End: 2025-04-02
Payer: COMMERCIAL

## 2025-04-02 VITALS
DIASTOLIC BLOOD PRESSURE: 80 MMHG | SYSTOLIC BLOOD PRESSURE: 126 MMHG | OXYGEN SATURATION: 97 % | HEART RATE: 75 BPM | BODY MASS INDEX: 40.02 KG/M2 | WEIGHT: 249 LBS | HEIGHT: 66 IN

## 2025-04-02 DIAGNOSIS — G89.29 CHRONIC PAIN OF RIGHT KNEE: ICD-10-CM

## 2025-04-02 DIAGNOSIS — Z13.220 LIPID SCREENING: ICD-10-CM

## 2025-04-02 DIAGNOSIS — F41.9 ANXIETY AND DEPRESSION: ICD-10-CM

## 2025-04-02 DIAGNOSIS — M25.561 CHRONIC PAIN OF RIGHT KNEE: ICD-10-CM

## 2025-04-02 DIAGNOSIS — F17.200 SMOKER: ICD-10-CM

## 2025-04-02 DIAGNOSIS — Z13.29 THYROID DISORDER SCREEN: ICD-10-CM

## 2025-04-02 DIAGNOSIS — F32.A ANXIETY AND DEPRESSION: ICD-10-CM

## 2025-04-02 DIAGNOSIS — R73.03 PREDIABETES: ICD-10-CM

## 2025-04-02 DIAGNOSIS — I10 PRIMARY HYPERTENSION: Primary | ICD-10-CM

## 2025-04-02 PROCEDURE — 99214 OFFICE O/P EST MOD 30 MIN: CPT | Performed by: NURSE PRACTITIONER

## 2025-04-02 NOTE — ASSESSMENT & PLAN NOTE
Patient reports she has still been taking the meloxicam prescribed by Ortho for her right knee pain, patient had some concerns about taking this long-term, discussed with patient that I would recommend taking on an as-needed basis, as long as she does not have any problems and kidney function remains normal she can continue to take the medication.  We did discuss the risks of long-term NSAID use, recommend she stay well-hydrated, we will monitor her labs

## 2025-04-02 NOTE — PROGRESS NOTES
"Chief Complaint  Hypertension    SUBJECTIVE  Jewels Beltrán presents to [facility] to follow up on HTN.     At last visit pt decided to stop the Zoloft, pt reports doing well at this time.     Patient states she is still taking meloxicam for her knee pain, would like to discuss this    History of Present Illness  Past Medical History:   Diagnosis Date    Abnormal EKG 04/10/2024    EVAL W/CARDIOLOGY-NORMAL  STRESS TEST, NO CP OR SOA    Anxiety     Arthritis     Hypertension     Screening for malignant neoplasm of colon 01/31/2024    Sleep apnea     postitonal      Family History   Problem Relation Age of Onset    Diabetes Mother     Cancer Mother     Arthritis Mother     Heart disease Father     Malig Hyperthermia Neg Hx       Past Surgical History:   Procedure Laterality Date    COLONOSCOPY      COLONOSCOPY N/A 3/17/2025    Procedure: COLONOSCOPY;  Surgeon: Imer Ball MD;  Location: [facility] ENDOSCOPY;  Service: General;  Laterality: N/A;  NORMAL    LAPAROSCOPIC TUBAL LIGATION      TOTAL KNEE ARTHROPLASTY Right 05/22/2024    Procedure: RIGHT TOTAL KNEE ARTHROPLASTY WITH LYNN ROBOT;  Surgeon: Bradley Bal MD;  Location: [facility] MAIN OR;  Service: Robotics - Ortho;  Laterality: Right;        Current Outpatient Medications:     aspirin (Ecotrin) 325 MG EC tablet, Take 1 tablet by mouth Daily. Begin after completing Eliquis, Disp: 21 tablet, Rfl: 0    hydroCHLOROthiazide 12.5 MG tablet, TAKE 1 TABLET BY MOUTH DAILY, Disp: 90 tablet, Rfl: 0    lisinopril (PRINIVIL,ZESTRIL) 40 MG tablet, TAKE 1 TABLET BY MOUTH DAILY, Disp: 90 tablet, Rfl: 0    meloxicam (MOBIC) 15 MG tablet, TAKE 1 TABLET BY MOUTH DAILY, Disp: 90 tablet, Rfl: 0    OBJECTIVE  Vital Signs:   /80   Pulse 75   Ht 167.6 cm (65.98\")   Wt 113 kg (249 lb)   LMP 10/08/2016 (Approximate) Comment: 2017  SpO2 97%   BMI 40.21 kg/m²    Estimated body mass index is 40.21 kg/m² as calculated from the " "following:    Height as of this encounter: 167.6 cm (65.98\").    Weight as of this encounter: 113 kg (249 lb).     Wt Readings from Last 3 Encounters:   04/02/25 113 kg (249 lb)   03/17/25 111 kg (245 lb 2.4 oz)   02/12/25 113 kg (248 lb 14.4 oz)     BP Readings from Last 3 Encounters:   04/02/25 126/80   03/17/25 107/77   02/12/25 144/95       Physical Exam  Vitals reviewed.   Constitutional:       Appearance: Normal appearance. She is well-developed.   HENT:      Head: Normocephalic and atraumatic.      Right Ear: External ear normal.      Left Ear: External ear normal.   Eyes:      Conjunctiva/sclera: Conjunctivae normal.      Pupils: Pupils are equal, round, and reactive to light.   Cardiovascular:      Rate and Rhythm: Normal rate and regular rhythm.      Heart sounds: No murmur heard.     No friction rub. No gallop.   Pulmonary:      Effort: Pulmonary effort is normal.      Breath sounds: Normal breath sounds. No wheezing or rhonchi.   Skin:     General: Skin is warm and dry.   Neurological:      Mental Status: She is alert and oriented to person, place, and time.      Cranial Nerves: No cranial nerve deficit.   Psychiatric:         Mood and Affect: Mood and affect normal.         Behavior: Behavior normal.         Thought Content: Thought content normal.         Judgment: Judgment normal.          Result Review    [facility]          4/10/2024    10:26 5/15/2024    09:26 10/7/2024    06:42   CMP   Glucose 102  167  107    BUN 18  15  19    Creatinine 0.87  0.87  0.91    EGFR 76.4  76.4  72.4    Sodium 137  136  140    Potassium 4.0  3.7  4.3    Chloride 102  100  105    Calcium 10.3  10.0  10.1    Total Protein 7.2  7.2  6.7    Albumin 4.4  4.3  4.1    Globulin 2.8  2.9  2.6    Total Bilirubin 0.7  0.7  0.4    Alkaline Phosphatase 81  84  85    AST (SGOT) 21  18  18    ALT (SGPT) 28  20  21    Albumin/Globulin Ratio 1.6  1.5  1.6    BUN/Creatinine Ratio 20.7  17.2  20.9    Anion Gap 11.5  9.9  8.8      CBC          " 5/15/2024    09:26 5/23/2024    04:02 10/7/2024    06:42   CBC   WBC 8.73   7.45    RBC 4.74   4.83    Hemoglobin 14.2  12.2  14.1    Hematocrit 42.5  37.6  44.2    MCV 89.7   91.5    MCH 30.0   29.2    MCHC 33.4   31.9    RDW 13.2   13.2    Platelets 284   287      Lipid Panel          10/7/2024    06:42   Lipid Panel   Total Cholesterol 183    Triglycerides 102    HDL Cholesterol 42    VLDL Cholesterol 19    LDL Cholesterol  122    LDL/HDL Ratio 2.87      TSH          10/7/2024    06:42   TSH   TSH 1.490      Most Recent A1C          5/15/2024    09:26   HGBA1C Most Recent   Hemoglobin A1C 5.60        No Images in the past 120 days found..     The above data has been reviewed by AYAN Garzon 04/02/2025 09:25 EDT.          Patient Care Team:  Ashly Andrew APRN as PCP - General (Nurse Practitioner)         ASSESSMENT & PLAN    Diagnoses and all orders for this visit:    1. Primary hypertension (Primary)  Overview:  Hypertension is stable, well-controlled, continue current dose of medication    Orders:  -     Comprehensive Metabolic Panel; Future  -     CBC & Differential; Future  -     Lipid Panel; Future    2. Anxiety and depression  Assessment & Plan:  Patient is still doing very well without medication.  We will continue to monitor      3. Prediabetes  -     Hemoglobin A1c; Future    4. Lipid screening  -     Lipid Panel; Future    5. Thyroid disorder screen  -     TSH Rfx On Abnormal To Free T4; Future    6. Smoker  Assessment & Plan:  Still smoking at present, almost 1ppd, not ready to quit at present       7. Chronic pain of right knee  Assessment & Plan:  Patient reports she has still been taking the meloxicam prescribed by Ortho for her right knee pain, patient had some concerns about taking this long-term, discussed with patient that I would recommend taking on an as-needed basis, as long as she does not have any problems and kidney function remains normal she can continue to take the  medication.  We did discuss the risks of long-term NSAID use, recommend she stay well-hydrated, we will monitor her labs           Tobacco Use: High Risk (4/2/2025)    Patient History     Smoking Tobacco Use: Every Day     Smokeless Tobacco Use: Never     Passive Exposure: Current       Follow Up     Return in about 6 months (around 10/2/2025), or if symptoms worsen or fail to improve.        Patient was given instructions and counseling regarding her condition or for health maintenance advice. Please see specific information pulled into the AVS if appropriate.   I have reviewed information obtained and documented by others and I have confirmed the accuracy of this documented note.    AYAN Garzon

## 2025-04-23 ENCOUNTER — OFFICE VISIT (OUTPATIENT)
Dept: PODIATRY | Facility: CLINIC | Age: 60
End: 2025-04-23
Payer: COMMERCIAL

## 2025-04-23 VITALS
OXYGEN SATURATION: 96 % | SYSTOLIC BLOOD PRESSURE: 121 MMHG | TEMPERATURE: 96.7 F | WEIGHT: 251 LBS | HEIGHT: 66 IN | BODY MASS INDEX: 40.34 KG/M2 | HEART RATE: 72 BPM | DIASTOLIC BLOOD PRESSURE: 88 MMHG

## 2025-04-23 DIAGNOSIS — M20.22 HALLUX RIGIDUS OF LEFT FOOT: ICD-10-CM

## 2025-04-23 DIAGNOSIS — Q66.229 METATARSUS ADDUCTUS: Primary | ICD-10-CM

## 2025-04-23 DIAGNOSIS — M21.6X2 EQUINUS DEFORMITY OF LEFT FOOT: ICD-10-CM

## 2025-04-23 NOTE — PROGRESS NOTES
[facility] P[facility]IATRY    Today's Date: 04/23/25    Patient Name: Jewels Beltrán  MRN: [id number]  CSN: [id number]  PCP: Ashly Andrew APRN,   Referring Provider: No ref. provider found    SUBJECTIVE     Chief Complaint   Patient presents with    Left Foot - Establish Care, Pain     Pain top of foot for several years has had no treatment    Right Foot - Establish Care, Nail Problem     Nail fungus x 4-5 yrs      HPI: Jewels Beltrán, a 59 y.o.female, presents to clinic.    History of Present Illness  The patient presents for evaluation of foot pain and toenail fungus.    She has been experiencing persistent foot pain for several years, with no recollection of any specific injury. She also reports the presence of a bump on her foot, which is generally asymptomatic but causes discomfort when wearing certain types of footwear. Her occupation in retail requires prolonged standing, which exacerbates her symptoms. She recalls an incident a few years ago where [place] stepped [place], resulting in temporary pain. She has attempted to alleviate her symptoms with insoles and tennis shoes, but these interventions have not been effective. She has also tried Spenco inserts, but found them too tight and uncomfortable for extended use. She has been off meloxicam for the past 2 weeks, a medication she had been taking daily for 4 years for her knee condition, and found it to be highly effective. She has a history of ankle fracture in 2008, which was accompanied by significant swelling. Since then, she has experienced numbness in her foot when wearing tight-fitting tennis shoes.                Past Medical History:   Diagnosis Date    Abnormal EKG 04/10/2024    EVAL W/CARDIOLOGY-NORMAL  STRESS TEST, NO CP OR SOA    Anxiety     Arthritis     Fracture of ankle 2008    Hypertension     Knee swelling     Screening for malignant neoplasm of colon 01/31/2024    Sleep apnea     postitonal      Past Surgical History:   Procedure Laterality Date    COLONOSCOPY      COLONOSCOPY N/A 3/17/2025    Procedure: COLONOSCOPY;  Surgeon: Imer Ball MD;  Location: [facility] ENDOSCOPY;  Service: General;  Laterality: N/A;  NORMAL    LAPAROSCOPIC TUBAL LIGATION      TOTAL KNEE ARTHROPLASTY Right 05/22/2024    Procedure: RIGHT TOTAL KNEE ARTHROPLASTY WITH LYNN ROBOT;  Surgeon: Bradley Bal MD;  Location: [facility] MAIN OR;  Service: Robotics - Ortho;  Laterality: Right;     Family History   Problem Relation Age of Onset    Diabetes Mother     Cancer Mother     Arthritis Mother     Heart disease Father     Malig Hyperthermia Neg Hx      Social History     Socioeconomic History    Marital status:    Tobacco Use    Smoking status: Every Day     Current packs/day: 1.00     Average packs/day: 1 pack/day for 45.3 years (45.3 ttl pk-yrs)     Types: Cigarettes     Start date: 1/1/1980     Passive exposure: Current    Smokeless tobacco: Never   Vaping Use    Vaping status: Never Used   Substance and Sexual Activity    Alcohol use: Yes     Alcohol/week: 2.0 standard drinks of alcohol     Types: 2 Drinks containing 0.5 oz of alcohol per week     Comment: [employer]IN[employer]    Drug use: Never    Sexual activity: Not Currently     No Known Allergies  Current Outpatient Medications   Medication Sig Dispense Refill    aspirin (Ecotrin) 325 MG EC tablet Take 1 tablet by mouth Daily. Begin after completing Eliquis 21 tablet 0    hydroCHLOROthiazide 12.5 MG tablet TAKE 1 TABLET BY MOUTH DAILY 90 tablet 0    lisinopril (PRINIVIL,ZESTRIL) 40 MG tablet TAKE 1 TABLET BY MOUTH DAILY 90 tablet 0    meloxicam (MOBIC) 15 MG tablet TAKE 1 TABLET BY MOUTH DAILY (Patient not taking: Reported on 4/23/2025) 90 tablet 0     No current facility-administered medications for this visit.         OBJECTIVE     Vitals:    04/23/25 0728   BP: 121/88   Pulse: 72   Temp: 96.7 °F (35.9 °C)   SpO2: 96%       WBC   Date Value Ref  Range Status   10/07/2024 7.45 3.40 - 10.80 10*3/mm3 Final     RBC   Date Value Ref Range Status   10/07/2024 4.83 3.77 - 5.28 10*6/mm3 Final     Hemoglobin   Date Value Ref Range Status   10/07/2024 14.1 12.0 - 15.9 g/dL Final     Hematocrit   Date Value Ref Range Status   10/07/2024 44.2 34.0 - 46.6 % Final     MCV   Date Value Ref Range Status   10/07/2024 91.5 79.0 - 97.0 fL Final     MCH   Date Value Ref Range Status   10/07/2024 29.2 26.6 - 33.0 pg Final     MCHC   Date Value Ref Range Status   10/07/2024 31.9 31.5 - 35.7 g/dL Final     RDW   Date Value Ref Range Status   10/07/2024 13.2 12.3 - 15.4 % Final     RDW-SD   Date Value Ref Range Status   10/07/2024 44.7 37.0 - 54.0 fl Final     MPV   Date Value Ref Range Status   10/07/2024 11.9 6.0 - 12.0 fL Final     Platelets   Date Value Ref Range Status   10/07/2024 287 140 - 450 10*3/mm3 Final     Neutrophil %   Date Value Ref Range Status   10/07/2024 60.6 42.7 - 76.0 % Final     Lymphocyte %   Date Value Ref Range Status   10/07/2024 25.6 19.6 - 45.3 % Final     Monocyte %   Date Value Ref Range Status   10/07/2024 10.3 5.0 - 12.0 % Final     Eosinophil %   Date Value Ref Range Status   10/07/2024 2.6 0.3 - 6.2 % Final     Basophil %   Date Value Ref Range Status   10/07/2024 0.8 0.0 - 1.5 % Final     Immature Grans %   Date Value Ref Range Status   10/07/2024 0.1 0.0 - 0.5 % Final     Neutrophils, Absolute   Date Value Ref Range Status   10/07/2024 4.51 1.70 - 7.00 10*3/mm3 Final     Lymphocytes, Absolute   Date Value Ref Range Status   10/07/2024 1.91 0.70 - 3.10 10*3/mm3 Final     Monocytes, Absolute   Date Value Ref Range Status   10/07/2024 0.77 0.10 - 0.90 10*3/mm3 Final     Eosinophils, Absolute   Date Value Ref Range Status   10/07/2024 0.19 0.00 - 0.40 10*3/mm3 Final     Basophils, Absolute   Date Value Ref Range Status   10/07/2024 0.06 0.00 - 0.20 10*3/mm3 Final     Immature Grans, Absolute   Date Value Ref Range Status   10/07/2024 0.01 0.00 -  0.05 10*3/mm3 Final     nRBC   Date Value Ref Range Status   10/07/2024 0.0 0.0 - 0.2 /100 WBC Final         Lab Results   Component Value Date    GLUCOSE 107 (H) 10/07/2024    BUN 19 10/07/2024    CREATININE 0.91 10/07/2024    EGFRIFNONA 61 02/02/2022    BCR 20.9 10/07/2024    K 4.3 10/07/2024    CO2 26.2 10/07/2024    CALCIUM 10.1 10/07/2024    ALBUMIN 4.1 10/07/2024    AST 18 10/07/2024    ALT 21 10/07/2024       Patient seen in no apparent distress.      PHYSICAL EXAM:     Foot/Ankle Exam    GENERAL  Appearance:  appears stated age  Orientation:  AAOx3  Affect:  appropriate  Gait:  unimpaired  Assistance:  independent  Right shoe gear: casual shoe  Left shoe gear: casual shoe    VASCULAR     Right Foot Vascularity   Normal vascular exam    Dorsalis pedis:  2+  Posterior tibial:  2+  Skin temperature:  warm  Edema grading:  None  CFT:  < 3 seconds  Pedal hair growth:  Present  Varicosities:  none     Left Foot Vascularity   Normal vascular exam    Dorsalis pedis:  2+  Posterior tibial:  2+  Skin temperature:  warm  Edema grading:  None  CFT:  < 3 seconds  Pedal hair growth:  Present  Varicosities:  none     NEUROLOGIC     Right Foot Neurologic   Normal sensation    Light touch sensation: normal  Vibratory sensation: normal  Hot/Cold sensation: normal  Protective Sensation using Neligh-Marco Monofilament:   Sites intact: 10  Sites tested: 10     Left Foot Neurologic   Normal sensation    Light touch sensation: normal  Vibratory sensation: normal  Hot/Cold sensation:  normal  Protective Sensation using Neligh-Marco Monofilament:   Sites intact: 10  Sites tested: 10    MUSCULOSKELETAL     Left Foot Musculoskeletal   Tenderness:  MTP 1 dorsal tenderness, metatarsal 3 and metatarsal 4    MUSCLE STRENGTH     Right Foot Muscle Strength   Foot dorsiflexion:  4  Foot plantar flexion:  4  Foot inversion:  4  Foot eversion:  4     Left Foot Muscle Strength   Foot dorsiflexion:  4  Foot plantar flexion:  4  Foot  inversion:  4  Foot eversion:  4    RANGE OF MOTION     Right Foot Range of Motion   Foot and ankle ROM within normal limits       Left Foot Range of Motion   Foot and ankle ROM within normal limits    1st MTP extension: decreased with pain  1st MTP flexion: decreased with pain    DERMATOLOGIC      Right Foot Dermatologic   Skin  Right foot skin is intact.      Left Foot Dermatologic   Skin  Left foot skin is intact.       RADIOLOGY:        No results found.    ASSESSMENT/PLAN     Diagnoses and all orders for this visit:    1. Metatarsus adductus (Primary)    2. Hallux rigidus of left foot    3. Equinus deformity of left foot        Comprehensive lower extremity examination and evaluation was performed.    Assessment & Plan    The foot pain is likely secondary arthritis in the big toe, causing pressure on the ball of the foot. The presence of metatarsus adductus may also contribute to the discomfort. She was advised to try men's shoes for additional space and to use compression socks to manage swelling. Over-the-counter Powerstep inserts were recommended. Stretching exercises for the ankle and calf muscles were suggested to alleviate pressure on the ball of the foot. Meloxicam was recommended as needed for pain management.     If conservative measures fail, surgical intervention involving first metatarsal phalangeal joint fusion and gastroc recession was discussed with patient.         Discussed findings and treatment plan including risks, benefits, and treatment options with patient in detail. Patient agreed with treatment plan.    Medications and allergies reviewed.  Reviewed available lab values along with other pertinent labs.  These were discussed with the patient.    All questions were answered to patient/family satisfaction. Should symptoms fail to improve or worsen they agree to call or return to clinic or to go to the Emergency Department. Discussed the importance of following up with any needed screening  tests/labs/specialist appointments and any requested follow-up recommended by me today. Importance of maintaining follow-up discussed and patient accepts that missed appointments can delay diagnosis and potentially lead to worsening of conditions.    Return in about 3 months (around 7/23/2025)., or sooner if acute issues arise.    Patient or patient representative verbalized consent for the use of Ambient Listening during the visit with  Juan Pablo Bey DPM for chart documentation. 4/23/2025  13:09 EDT    This document has been electronically signed by Juan Pablo Bey DPM on April 23, 2025 13:09 EDT

## 2025-04-25 ENCOUNTER — PATIENT ROUNDING (BHMG ONLY) (OUTPATIENT)
Dept: PODIATRY | Facility: CLINIC | Age: 60
End: 2025-04-25
Payer: COMMERCIAL

## 2025-04-25 NOTE — PROGRESS NOTES
A [employer] message has been sent to the patient for PATIENT ROUNDING with [facility] [place] [facility]

## 2025-05-16 RX ORDER — MELOXICAM 15 MG/1
15 TABLET ORAL DAILY
Qty: 90 TABLET | Refills: 0 | Status: SHIPPED | OUTPATIENT
Start: 2025-05-16

## 2025-06-02 ENCOUNTER — OFFICE VISIT (OUTPATIENT)
Dept: ORTHOPEDIC SURGERY | Facility: CLINIC | Age: 60
End: 2025-06-02
Payer: COMMERCIAL

## 2025-06-02 VITALS
HEART RATE: 68 BPM | SYSTOLIC BLOOD PRESSURE: 142 MMHG | OXYGEN SATURATION: 96 % | DIASTOLIC BLOOD PRESSURE: 91 MMHG | HEIGHT: 66 IN | WEIGHT: 250 LBS | BODY MASS INDEX: 40.18 KG/M2

## 2025-06-02 DIAGNOSIS — Z47.89 AFTERCARE FOLLOWING SURGERY OF THE MUSCULOSKELETAL SYSTEM: Primary | ICD-10-CM

## 2025-06-02 DIAGNOSIS — M25.561 RIGHT KNEE PAIN, UNSPECIFIED CHRONICITY: ICD-10-CM

## 2025-06-02 PROCEDURE — 99213 OFFICE O/P EST LOW 20 MIN: CPT | Performed by: PHYSICIAN ASSISTANT

## 2025-06-30 DIAGNOSIS — I10 PRIMARY HYPERTENSION: ICD-10-CM

## 2025-06-30 RX ORDER — HYDROCHLOROTHIAZIDE 12.5 MG/1
12.5 TABLET ORAL DAILY
Qty: 90 TABLET | Refills: 0 | Status: SHIPPED | OUTPATIENT
Start: 2025-06-30

## 2025-06-30 RX ORDER — LISINOPRIL 40 MG/1
40 TABLET ORAL DAILY
Qty: 90 TABLET | Refills: 0 | Status: SHIPPED | OUTPATIENT
Start: 2025-06-30

## 2025-08-22 RX ORDER — MELOXICAM 15 MG/1
15 TABLET ORAL DAILY
Qty: 90 TABLET | Refills: 0 | Status: SHIPPED | OUTPATIENT
Start: 2025-08-22

## (undated) DEVICE — APPL CHLORAPREP HI/LITE 26ML ORNG

## (undated) DEVICE — NDL HYPO ECLPS SFTY 18G 1 1/2IN

## (undated) DEVICE — BASIC SINGLE BASIN-LF: Brand: MEDLINE INDUSTRIES, INC.

## (undated) DEVICE — SOL IRRG H2O PL/BG 1000ML STRL

## (undated) DEVICE — GLV SURG SENSICARE PI ORTHO SZ6.5 LF STRL

## (undated) DEVICE — SCRW HEX PERSONA FML 2.5X25MM PK/2
Type: IMPLANTABLE DEVICE | Site: KNEE | Status: NON-FUNCTIONAL
Removed: 2024-05-22

## (undated) DEVICE — GLV SURG SENSICARE PI ORTHO SZ8 LF STRL

## (undated) DEVICE — SHEET,DRAPE,70X85,STERILE: Brand: MEDLINE

## (undated) DEVICE — DRP SURG U/DRP INVISISHIELD PA 48X52IN

## (undated) DEVICE — 3 BONE CEMENT MIXER: Brand: MIXEVAC

## (undated) DEVICE — Device

## (undated) DEVICE — NO-SCRATCH ™ SMALL WHITNEY CURETTE ™ IS A SINGLE-USE, PLASTIC CURETTE FOR QUICKLY APPLYING, MANIPULATING AND REMOVING BONE CEMENT DURING HIP AND KNEE REPLACEMENT SURGERY. THE PLASTIC IS SOFTER THAN STEEL INSTRUMENTS, REDUCING THE RISK OF DAMAGING THE PROSTHESIS WITH METAL INSTRUMENTS.  THE CURETTE’S 6MM TIP REMOVES EXCESS CEMENT FROM REPLACEMENT HIPS AND KNEES. EASY-TO-MANEUVER, THE SMALL BLUE CURETTE LETS YOU REMOVE CEMENT FROM ALL EDGES OF THE PROSTHESIS.NO-SCRATCH WHITNEY SMALL CURETTE FEATURES:SAFER THAN STEEL- MADE OF PLASTIC - STURDY YET SOFTER THAN SURGICAL STEEL.HANDIER- EACH TOOL HAS A MOLDED-IN THUMB INDENTATION INSTANTLY ORIENTING THE TOOL.- EASIER TO MANEUVER IN HARD TO SEE PLACES.- COLOR-CODED FOR EASY IDENTIFICATION.FASTER- COMES INDIVIDUALLY PACKAGED IN STERILE, PEEL OPEN POUCH, READY TO GO.- APPLIES, MANIPULATES, OR REMOVES CEMENT WITH FINGERTIP PRECISION.ECONOMICAL- THE COST OF A SINGLE REVISION DWARFS THE COST OF A SINGLE-USE CURETTE. - DISPOSABLE – THERE’S NO NEED TO WASTE TIME REMOVING HARDENED CEMENT OR RE-STERILIZING TOOLS.- LESS EXPENSIVE TO BUY AND INVENTORY - ORDER ONLY THE TOOL YOU USE.- PACKAGED 25 INDIVIDUALLY WRAPPED TOOLS TO A CARTON FOR CONVENIENT SHELF STORAGE.: Brand: WHITNEY NO-SCRATCH CURETTE (SMALL)

## (undated) DEVICE — TOWEL,OR,DSP,ST,BLUE,STD,4/PK,20PK/CS: Brand: MEDLINE

## (undated) DEVICE — DRP ROBOTIC ROSA BX/20

## (undated) DEVICE — BNDG ELAS MATRX V/CLS 6INX10YD LF

## (undated) DEVICE — SOL IRR NACL 0.9PCT 3000ML

## (undated) DEVICE — DISPOSABLE TOURNIQUET CUFF 30"X4", 1-LINE, WHITE, STERILE, 1EA/PK, 10PK/CS: Brand: ASP MEDICAL

## (undated) DEVICE — DRSNG PAD ABD 8X10IN STRL

## (undated) DEVICE — SLV SCD KN/LEN ADJ EXPRSS BLENDED MD 1P/U

## (undated) DEVICE — CONN JET HYDRA H20 AUXILIARY DISP

## (undated) DEVICE — STERILE PATIENT PROTECTIVE PAD FOR IMP® KNEE POSITIONERS & COHESIVE WRAP (10 / CASE): Brand: DE MAYO KNEE POSITIONER®

## (undated) DEVICE — SOL IRR NACL 0.9PCT BO 1000ML

## (undated) DEVICE — GAUZE,SPONGE,4"X4",16PLY,STRL,LF,10/TRAY: Brand: MEDLINE

## (undated) DEVICE — PULLOVER TOGA, 2X LARGE: Brand: FLYTE, SURGICOOL

## (undated) DEVICE — LINER SURG CANSTR SXN S/RIGD 1500CC

## (undated) DEVICE — PENCL E/S HNDSWCH ROCKR CB

## (undated) DEVICE — PROXIMATE RH ROTATING HEAD SKIN STAPLERS (35 WIDE) CONTAINS 35 STAINLESS STEEL STAPLES: Brand: PROXIMATE

## (undated) DEVICE — ANTIBACTERIAL VIOLET BRAIDED (POLYGLACTIN 910), SYNTHETIC ABSORBABLE SURGICAL SUTURE: Brand: COATED VICRYL

## (undated) DEVICE — STERILE POLYISOPRENE POWDER-FREE SURGICAL GLOVES WITH EMOLLIENT COATING: Brand: PROTEXIS

## (undated) DEVICE — GOWN,SIRUS,POLYRNF,BRTHSLV,2XL,18/CS: Brand: MEDLINE

## (undated) DEVICE — STRYKER PERFORMANCE SERIES SAGITTAL BLADE: Brand: STRYKER PERFORMANCE SERIES

## (undated) DEVICE — DRSNG SURESITE WNDW 4X4.5

## (undated) DEVICE — SUT VIC 2/0 CT1 36IN

## (undated) DEVICE — STERILE POLYISOPRENE POWDER-FREE SURGICAL GLOVES: Brand: PROTEXIS

## (undated) DEVICE — SOLIDIFIER LIQLOC PLS 1500CC BT

## (undated) DEVICE — DRESSING,GAUZE,XEROFORM,CURAD,1"X8",ST: Brand: CURAD

## (undated) DEVICE — SYR LUERLOK 30CC

## (undated) DEVICE — THE STERILE LIGHT HANDLE COVER IS USED WITH STERIS SURGICAL LIGHTING AND VISUALIZATION SYSTEMS.

## (undated) DEVICE — UNDERCAST PADDING: Brand: DEROYAL

## (undated) DEVICE — TOTAL KNEE-LF: Brand: MEDLINE INDUSTRIES, INC.

## (undated) DEVICE — MAT FLR ABS W/BLU/LINER 56X72IN WHT

## (undated) DEVICE — INTENDED FOR TISSUE SEPARATION, AND OTHER PROCEDURES THAT REQUIRE A SHARP SURGICAL BLADE TO PUNCTURE OR CUT.: Brand: BARD-PARKER ® CARBON RIB-BACK BLADES

## (undated) DEVICE — CVR LEG BOOTLEG F/R NOSKID 33IN

## (undated) DEVICE — SUT ETHLN 3/0 FS1 663G

## (undated) DEVICE — PEEL-AWAY TOGA, 2X LARGE: Brand: FLYTE

## (undated) DEVICE — Device: Brand: PULSAVAC®

## (undated) DEVICE — DEFENDO AIR WATER SUCTION AND BIOPSY VALVE KIT FOR  OLYMPUS: Brand: DEFENDO AIR/WATER/SUCTION AND BIOPSY VALVE